# Patient Record
Sex: FEMALE | Race: WHITE | NOT HISPANIC OR LATINO | URBAN - METROPOLITAN AREA
[De-identification: names, ages, dates, MRNs, and addresses within clinical notes are randomized per-mention and may not be internally consistent; named-entity substitution may affect disease eponyms.]

---

## 2017-11-13 ENCOUNTER — INPATIENT (INPATIENT)
Facility: HOSPITAL | Age: 32
LOS: 0 days | Discharge: HOME | DRG: 103 | End: 2017-11-14
Attending: INTERNAL MEDICINE | Admitting: INTERNAL MEDICINE
Payer: COMMERCIAL

## 2017-11-13 VITALS
DIASTOLIC BLOOD PRESSURE: 67 MMHG | RESPIRATION RATE: 16 BRPM | WEIGHT: 134.92 LBS | HEART RATE: 94 BPM | TEMPERATURE: 98 F | OXYGEN SATURATION: 98 % | SYSTOLIC BLOOD PRESSURE: 99 MMHG

## 2017-11-13 DIAGNOSIS — A87.9 VIRAL MENINGITIS, UNSPECIFIED: ICD-10-CM

## 2017-11-13 DIAGNOSIS — R51 HEADACHE: ICD-10-CM

## 2017-11-13 DIAGNOSIS — R63.8 OTHER SYMPTOMS AND SIGNS CONCERNING FOOD AND FLUID INTAKE: ICD-10-CM

## 2017-11-13 DIAGNOSIS — Z29.9 ENCOUNTER FOR PROPHYLACTIC MEASURES, UNSPECIFIED: ICD-10-CM

## 2017-11-13 LAB
ALBUMIN SERPL ELPH-MCNC: 4.3 G/DL — SIGNIFICANT CHANGE UP (ref 3.3–5)
ALP SERPL-CCNC: 56 U/L — SIGNIFICANT CHANGE UP (ref 40–120)
ALT FLD-CCNC: 26 U/L — SIGNIFICANT CHANGE UP (ref 10–45)
ANION GAP SERPL CALC-SCNC: 13 MMOL/L — SIGNIFICANT CHANGE UP (ref 5–17)
ANISOCYTOSIS BLD QL: SLIGHT — SIGNIFICANT CHANGE UP
APTT BLD: 32.1 SEC — SIGNIFICANT CHANGE UP (ref 27.5–37.4)
AST SERPL-CCNC: 12 U/L — SIGNIFICANT CHANGE UP (ref 10–40)
BASOPHILS NFR BLD AUTO: 1 % — SIGNIFICANT CHANGE UP (ref 0–2)
BILIRUB SERPL-MCNC: 0.2 MG/DL — SIGNIFICANT CHANGE UP (ref 0.2–1.2)
BUN SERPL-MCNC: 12 MG/DL — SIGNIFICANT CHANGE UP (ref 7–23)
CALCIUM SERPL-MCNC: 9.2 MG/DL — SIGNIFICANT CHANGE UP (ref 8.4–10.5)
CHLORIDE SERPL-SCNC: 104 MMOL/L — SIGNIFICANT CHANGE UP (ref 96–108)
CO2 SERPL-SCNC: 26 MMOL/L — SIGNIFICANT CHANGE UP (ref 22–31)
CREAT SERPL-MCNC: 0.61 MG/DL — SIGNIFICANT CHANGE UP (ref 0.5–1.3)
CRP SERPL-MCNC: 1.2 MG/DL — HIGH (ref 0–0.4)
EOSINOPHIL NFR BLD AUTO: 3 % — SIGNIFICANT CHANGE UP (ref 0–6)
ERYTHROCYTE [SEDIMENTATION RATE] IN BLOOD: 16 MM/HR — HIGH
GIANT PLATELETS BLD QL SMEAR: PRESENT — SIGNIFICANT CHANGE UP
GLUCOSE SERPL-MCNC: 87 MG/DL — SIGNIFICANT CHANGE UP (ref 70–99)
HCG SERPL-ACNC: <.1 MIU/ML — SIGNIFICANT CHANGE UP
HCT VFR BLD CALC: 38 % — SIGNIFICANT CHANGE UP (ref 34.5–45)
HGB BLD-MCNC: 13 G/DL — SIGNIFICANT CHANGE UP (ref 11.5–15.5)
INR BLD: 1.06 — SIGNIFICANT CHANGE UP (ref 0.88–1.16)
LG PLATELETS BLD QL AUTO: PRESENT — SIGNIFICANT CHANGE UP
LYMPHOCYTES # BLD AUTO: 45 % — HIGH (ref 13–44)
MACROCYTES BLD QL: SLIGHT — SIGNIFICANT CHANGE UP
MANUAL DIF COMMENT BLD-IMP: SIGNIFICANT CHANGE UP
MANUAL SMEAR VERIFICATION: SIGNIFICANT CHANGE UP
MCHC RBC-ENTMCNC: 29.7 PG — SIGNIFICANT CHANGE UP (ref 27–34)
MCHC RBC-ENTMCNC: 34.2 G/DL — SIGNIFICANT CHANGE UP (ref 32–36)
MCV RBC AUTO: 87 FL — SIGNIFICANT CHANGE UP (ref 80–100)
MONOCYTES NFR BLD AUTO: 8 % — SIGNIFICANT CHANGE UP (ref 2–14)
NEUTROPHILS NFR BLD AUTO: 43 % — SIGNIFICANT CHANGE UP (ref 43–77)
OVALOCYTES BLD QL SMEAR: SLIGHT — SIGNIFICANT CHANGE UP
PLAT MORPH BLD: (no result)
PLATELET # BLD AUTO: 206 K/UL — SIGNIFICANT CHANGE UP (ref 150–400)
POIKILOCYTOSIS BLD QL AUTO: SLIGHT — SIGNIFICANT CHANGE UP
POLYCHROMASIA BLD QL SMEAR: SLIGHT — SIGNIFICANT CHANGE UP
POTASSIUM SERPL-MCNC: 3.7 MMOL/L — SIGNIFICANT CHANGE UP (ref 3.5–5.3)
POTASSIUM SERPL-SCNC: 3.7 MMOL/L — SIGNIFICANT CHANGE UP (ref 3.5–5.3)
PROT SERPL-MCNC: 7.3 G/DL — SIGNIFICANT CHANGE UP (ref 6–8.3)
PROTHROM AB SERPL-ACNC: 11.8 SEC — SIGNIFICANT CHANGE UP (ref 9.8–12.7)
RAPID RVP RESULT: SIGNIFICANT CHANGE UP
RBC # BLD: 4.37 M/UL — SIGNIFICANT CHANGE UP (ref 3.8–5.2)
RBC # FLD: 12.6 % — SIGNIFICANT CHANGE UP (ref 10.3–16.9)
RBC BLD AUTO: (no result)
SODIUM SERPL-SCNC: 143 MMOL/L — SIGNIFICANT CHANGE UP (ref 135–145)
WBC # BLD: 6.3 K/UL — SIGNIFICANT CHANGE UP (ref 3.8–10.5)
WBC # FLD AUTO: 6.3 K/UL — SIGNIFICANT CHANGE UP (ref 3.8–10.5)

## 2017-11-13 PROCEDURE — 70551 MRI BRAIN STEM W/O DYE: CPT | Mod: 26

## 2017-11-13 PROCEDURE — 99284 EMERGENCY DEPT VISIT MOD MDM: CPT

## 2017-11-13 RX ORDER — SODIUM CHLORIDE 9 MG/ML
1000 INJECTION INTRAMUSCULAR; INTRAVENOUS; SUBCUTANEOUS
Qty: 0 | Refills: 0 | Status: DISCONTINUED | OUTPATIENT
Start: 2017-11-13 | End: 2017-11-14

## 2017-11-13 RX ORDER — INFLUENZA VIRUS VACCINE 15; 15; 15; 15 UG/.5ML; UG/.5ML; UG/.5ML; UG/.5ML
0.5 SUSPENSION INTRAMUSCULAR ONCE
Qty: 0 | Refills: 0 | Status: DISCONTINUED | OUTPATIENT
Start: 2017-11-13 | End: 2017-11-14

## 2017-11-13 RX ADMIN — SODIUM CHLORIDE 125 MILLILITER(S): 9 INJECTION INTRAMUSCULAR; INTRAVENOUS; SUBCUTANEOUS at 22:08

## 2017-11-13 RX ADMIN — Medication 1 TABLET(S): at 23:00

## 2017-11-13 RX ADMIN — Medication 1 TABLET(S): at 14:30

## 2017-11-13 RX ADMIN — Medication 1 TABLET(S): at 13:36

## 2017-11-13 RX ADMIN — Medication 1 TABLET(S): at 22:08

## 2017-11-13 RX ADMIN — SODIUM CHLORIDE 125 MILLILITER(S): 9 INJECTION INTRAMUSCULAR; INTRAVENOUS; SUBCUTANEOUS at 12:57

## 2017-11-13 NOTE — ED PROVIDER NOTE - OBJECTIVE STATEMENT
31 yo female h/o lumbar disc herniation s/p epidural injection 8/17 c/o resolved shooting pains bilat thighs, gen weakness, fever but continued myalgias and ha.  Pt w onset of sx last week, had eval at [facility] in [place] w neg mri spine w iv contrast, ct head, lp, labs and was discharged w supportive care and fiorocet.  Pt went to Dr Carty for continued ha and gen weakness, myalgias sx and sent to ed for eval.  Pt reports no fever x 2 d.  No photophobia.  Ha worse w standing up but was like this prior to lp done 4 d ago.  No uri sx, cough, sob, rash, cp, palpitations.  Pt had epigastric pain that started after taking medrol dose pack x 2 d last wk for what she thought was her disc related pain but stopped after 2 d and sx began to improve.  No travel, sick contacts.

## 2017-11-13 NOTE — H&P ADULT - PROBLEM SELECTOR PLAN 1
- Likely 2/2 prior lumbar puncture. Worse when standing, resolves completely when reclined.   - Dr. Carty following, appreciate recs.  - ESR/CRP elevated in the setting of resolving viral meningitis.  - f/u MR Brain.  - Consider blood patch for treatment of post-LP headache.  - Patient not complaining of any pain currently. Consider fioricet or tylenol if pain worsens. - Likely 2/2 prior lumbar puncture. Worse when standing, resolves completely when reclined.   - Dr. Carty following, appreciate recs.  - ESR/CRP elevated in the setting of resolving viral meningitis.  - f/u MR Brain.  - Consider blood patch for treatment of post-LP headache.  - Consider gallium scan  - f/u Procalcitonin  - Patient not complaining of any pain currently. Consider fioricet or tylenol if pain worsens.  - f/u BCx, RVP - Likely 2/2 prior lumbar puncture vs. resolving viral meningitis. Worse when standing, resolves completely when reclined.   - Dr. Carty following for neurology, rec noncontrast MRI brain to evaluate for meningeal enhancement; Recommending supportive tx w/ caffeine and possible blood patch in AM; appreciate recs.  - ESR/CRP elevated in the setting of resolving viral meningitis. Dr. Miller following from ID, will continue to monitor given resolving symptoms and no fever/WBC elevation.  - f/u MR Brain.  - Consider blood patch for treatment of post-LP headache.  - Consider gallium scan if fevers persist  - f/u Procalcitonin per ID recs  - Patient not complaining of any pain currently. Consider fioricet or tylenol if pain worsens.  - f/u BCx, RVP  - f/u official records from [facility]. Per records reported by Dr. Carty, WBC was WNL at ER visit, with LP w/ 2WBCs, 1RBC, protein 19, and normal glucose. influenza negative, lactic acid negative, beta-HCG negative.   - MRI lumbar spine from OSH report brought in by patient showing no evidence of abscess or spinal compression. 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

## 2017-11-13 NOTE — H&P ADULT - HISTORY OF PRESENT ILLNESS
32F pmhx lower back pain 2/2 work injury who presents with 72 hours of headache upon standing after being worked up at a hospital in [place], [place] for meningitis. Per the patient 1 year ago she hurt her back lifting a child in her job as a . She had such severe pain that she went for an epidural in August with good relief of her pain. On Monday she began to have severe pain that started in her groin area and shot down her legs. She thought it was a recurrence of her chronic lower back pain but the symptoms did not resolve and she couldn't sit or lay down without considerable worsening of her pain. Her leg pain at that time was associated with nuchal rigidity, photophobia and nausea and she was evaluated at the outside hospital with a CXR, CT, Lumbar MRI and Lumbar puncture. Her mother at the bedside will be bringing those records in the morning tomorrow. Per the patient and mother the workup was negative and she was diagnosed clinically with viral meningitis. She left the hospital with a prescription of fioricet and instructions 32F pmhx lower back pain 2/2 work injury who presents with 72 hours of headache upon standing after being worked up at a hospital in [place], [place] for meningitis. Per the patient 1 year ago she hurt her back lifting a child in her job as a . She had such severe pain that she went for an epidural in August with good relief of her pain. On Monday she began to have severe pain that started in her groin area and shot down her legs. She thought it was a recurrence of her chronic lower back pain but the symptoms did not resolve and she couldn't sit or lay down without considerable worsening of her pain. Her leg pain at that time was associated with Tmax 103.5, nuchal rigidity, photophobia and nausea and she was evaluated at the outside hospital with a CXR, CT, Lumbar MRI and Lumbar puncture. Her mother at the bedside will be bringing those records in the morning tomorrow. Per the patient and mother the workup was negative and she was diagnosed clinically with viral meningitis. She left the hospital with instructions for supportive care (fioricet, hydration). Her symptoms were slowly improving until Monday when she experienced excruciating pain in her head upon standing associated with nausea and inability to focus her eyes due to pain. The pain completely resolved upon laying down. She was seen by Dr. Carty in the office who advised her to come to the [facility] emergency department.    In the ED VS T 98.9, HR 91, BP 96/62, RR 18 SpO2 98%. She was given IVF, Fioricet and the ED staff spoke to Dr. Carty who requested a MR Brain, CRP and ESR and she was endorsed to the medicine service under Dr. Lopez.    Currently she denies any HA/F/C/N/V/CP/SOB. She endorses severe headache upon standing that is completely relieved when reclining.    ROS otherwise negative.

## 2017-11-13 NOTE — CHART NOTE - NSCHARTNOTEFT_GEN_A_CORE
Spoke with medicine physician at [facility] to confirm OSH test results. At time of ER visit on 11/09, Tmax was 39 degrees celcius. WBC 6.1 with normal differential, CSF studies showed clear fluid, with 2WBCs, 1RBC, Glucose 56, Protein 19, Gram stain negative, CSF and blood cultures with no growth to date, and MRI with no lumbar fluid collection, stenosis or abscess. CSF testing for west nile virus was negative. CSF and blood testing for lyme was still pending. The patient was offered admission for observation and declined, and was discharged from the ED with Fioricet and planned follow-up with PMD in 1-2 days. The patient's mother was able to supply MRI read report (in chart), and will obtain CDs of imaging tomorrow. Discussed results with Dr. Carty, who recommended getting MRI without contrast instead of with contrast, and continuing oral caffeine supplementation, with plan for possible blood patch by anesthesia in AM if symptoms unimproved. Spoke with medicine physician at [facility] to confirm test results. At time of ER visit on 11/09, Tmax was 39 degrees celcius. WBC 6.1 with normal differential, CSF studies showed clear fluid, with 2WBCs, 1RBC, Glucose 56, Protein 19, Gram stain negative, CSF and blood cultures with no growth to date, and MRI with no lumbar fluid collection, stenosis or abscess. CSF testing for west nile virus was negative. CSF and blood testing for lyme was still pending. No antibiotics were given in the ED. The patient was offered admission for observation and declined, and was discharged from the ED with Fioricet and planned follow-up with PMD in 1-2 days. The patient's mother was able to supply MRI read report (in chart), and will obtain CDs of imaging tomorrow. Discussed results with Dr. Carty, who recommended getting MRI without contrast instead of with contrast, and continuing oral caffeine supplementation, with plan for possible blood patch by anesthesia in AM if symptoms unimproved.

## 2017-11-13 NOTE — H&P ADULT - NSHPPHYSICALEXAM_GEN_ALL_CORE
.  VITAL SIGNS:  T(C): 37.5 (11-13-17 @ 15:02), Max: 37.5 (11-13-17 @ 15:02)  T(F): 99.5 (11-13-17 @ 15:02), Max: 99.5 (11-13-17 @ 15:02)  HR: 86 (11-13-17 @ 15:02) (86 - 94)  BP: 100/63 (11-13-17 @ 15:02) (96/62 - 100/63)  BP(mean): --  RR: 18 (11-13-17 @ 15:02) (16 - 18)  SpO2: 98% (11-13-17 @ 13:24) (98% - 98%)  Wt(kg): --    PHYSICAL EXAM:    Constitutional: WDWN resting comfortably in bed; NAD  Head: NC/AT  Eyes: PERRL, EOMI, anicteric sclera  ENT: no nasal discharge; uvula midline, no oropharyngeal erythema or exudates; MMM  Neck: supple; no JVD or thyromegaly  Respiratory: CTA B/L; no W/R/R, no retractions  Cardiac: +S1/S2; RRR; no M/R/G; PMI non-displaced  Gastrointestinal: soft, NT/ND; no rebound or guarding; +BSx4  Back: spine midline, no bony tenderness or step-offs; no CVAT B/L. Small, punctate scab on lower back, well healed.  Extremities: WWP, no clubbing or cyanosis; no peripheral edema  Musculoskeletal: NROM x4; no joint swelling, tenderness or erythema  Vascular: 2+ radial, femoral, DP/PT pulses B/L  Dermatologic: skin warm, dry and intact; no rashes, wounds, or scars  Lymphatic: no submandibular or cervical LAD  Neurologic: AAOx3; CNII-XII grossly intact; no focal deficits  Psychiatric: affect and characteristics of appearance, verbalizations, behaviors are appropriate .  VITAL SIGNS:  T(C): 37.5 (11-13-17 @ 15:02), Max: 37.5 (11-13-17 @ 15:02)  T(F): 99.5 (11-13-17 @ 15:02), Max: 99.5 (11-13-17 @ 15:02)  HR: 86 (11-13-17 @ 15:02) (86 - 94)  BP: 100/63 (11-13-17 @ 15:02) (96/62 - 100/63)  BP(mean): --  RR: 18 (11-13-17 @ 15:02) (16 - 18)  SpO2: 98% (11-13-17 @ 13:24) (98% - 98%)  Wt(kg): --    PHYSICAL EXAM:    Constitutional: WDWN resting comfortably in bed; NAD  Head: NC/AT  Eyes: PERRL, EOMI, anicteric sclera  ENT: no nasal discharge; uvula midline, no oropharyngeal erythema or exudates; MMM  Neck: supple; no JVD or thyromegaly. Minimal nuchal rigidity. Negative straight leg raise.  Respiratory: CTA B/L; no W/R/R, no retractions  Cardiac: +S1/S2; RRR; no M/R/G; PMI non-displaced  Gastrointestinal: soft, NT/ND; no rebound or guarding; +BSx4  Back: spine midline, no bony tenderness or step-offs; no CVAT B/L. Small, punctate scab on lower back, well healed.  Extremities: WWP, no clubbing or cyanosis; no peripheral edema  Musculoskeletal: NROM x4; no joint swelling, tenderness or erythema  Vascular: 2+ radial, femoral, DP/PT pulses B/L  Dermatologic: skin warm, dry and intact; no rashes, wounds, or scars  Lymphatic: no submandibular or cervical LAD  Neurologic: AAOx3; CNII-XII grossly intact; no focal deficits  Psychiatric: affect and characteristics of appearance, verbalizations, behaviors are appropriate

## 2017-11-13 NOTE — H&P ADULT - ASSESSMENT
32F no pmhx who presents with signs and symptoms consistent with post-LP headache s/p workup at OSH in [facility] for viral meningitis.

## 2017-11-13 NOTE — ED PROVIDER NOTE - PROGRESS NOTE DETAILS
Pt discussed w Dr Merino - requests pt have esr, crp, mri brain w contrast and he will follow as well as Dr So, pt tba to Dr Lopez.  Pt discussed w Dr Lopez - agrees w admit plan.  Pt afebrile and has been since yest - no need for isolation at this time. Labs, mri ordered.

## 2017-11-13 NOTE — H&P ADULT - NSHPLABSRESULTS_GEN_ALL_CORE
.  LABS:                         13.0   6.3   )-----------( 206      ( 13 Nov 2017 12:24 )             38.0     11-13    143  |  104  |  12  ----------------------------<  87  3.7   |  26  |  0.61    Ca    9.2      13 Nov 2017 12:24    TPro  7.3  /  Alb  4.3  /  TBili  0.2  /  DBili  x   /  AST  12  /  ALT  26  /  AlkPhos  56  11-13    PT/INR - ( 13 Nov 2017 12:24 )   PT: 11.8 sec;   INR: 1.06          PTT - ( 13 Nov 2017 12:24 )  PTT:32.1 sec              RADIOLOGY, EKG & ADDITIONAL TESTS: Reviewed.

## 2017-11-13 NOTE — ED ADULT NURSE NOTE - OBJECTIVE STATEMENT
Patient is a 32 year old female presenting to the ED complaining of headache and back pain that began 5 days ago. Patient states "I have chronic back pain so I thought it was just that but it began to snowball into other symptoms". Patient reports she had fevers for 5 days, reaching as high as 103.5. Patient also reports nuchal rigidity, body aches, photosensitivity. Patient states "if I were to stand up I would collapse from the pain if I weren't holding on to something". Patient denies any chest pain, nausea, vomiting or diarrhea, dizziness or falls. Patient reports she had a LP done and it was negative for meningitis, but the neurologist sent her to the ED because they believe it was a false negative. Patient is a 32 year old female presenting to the ED complaining of headache and back pain that began 5 days ago. Patient states "I have chronic back pain so I thought it was just that but it began to snowball into other symptoms". Patient reports she had fevers for 5 days, reaching as high as 103.5. Patient also reports nuchal rigidity, body aches, photosensitivity. Patient states "if I were to stand up I would collapse from the pain if I weren't holding on to something". Patient denies any sick contacts chest pain, nausea, vomiting or diarrhea, dizziness or falls. Patient reports she had a LP done and it was negative for meningitis, but the neurologist sent her to the ED because they believe it was a false negative.

## 2017-11-13 NOTE — H&P ADULT - PROBLEM SELECTOR PLAN 2
- Resolving. Plan as above.  - Pts mother to bring in outside records and imaging tomorrow morning (11/14). - Resolving. Plan as above. No indication for IV antibiotics at this time as pt remains  non-toxic, afebrile, with normal WBC, and has been improving off of antibiotics. Will continue to monitor closely.   - Pts mother to bring in outside records and imaging tomorrow morning (11/14).

## 2017-11-13 NOTE — ED ADULT NURSE NOTE - CHPI ED SYMPTOMS NEG
no vomiting/no nausea/no confusion/no dizziness/no numbness/no change in level of consciousness/no blurred vision

## 2017-11-13 NOTE — ED PROVIDER NOTE - MEDICAL DECISION MAKING DETAILS
Pt c/o resolved myalgias, fever but cont ha and generalized weakness since last week w neg lp, mri lumbar spine, labs, ct head.  Seen and eval by Dr Merino in his office, sent in for further eval tba to Dr Lopez.  Pt afebrile, no neuro deficits.  ? resolving viral meningitis vs post lp ha vs other etiology.  Plan labs, mri, admit.

## 2017-11-14 VITALS
SYSTOLIC BLOOD PRESSURE: 100 MMHG | HEART RATE: 85 BPM | OXYGEN SATURATION: 98 % | RESPIRATION RATE: 17 BRPM | DIASTOLIC BLOOD PRESSURE: 64 MMHG | TEMPERATURE: 98 F

## 2017-11-14 LAB
ANION GAP SERPL CALC-SCNC: 9 MMOL/L — SIGNIFICANT CHANGE UP (ref 5–17)
BUN SERPL-MCNC: 13 MG/DL — SIGNIFICANT CHANGE UP (ref 7–23)
CALCIUM SERPL-MCNC: 8.6 MG/DL — SIGNIFICANT CHANGE UP (ref 8.4–10.5)
CHLORIDE SERPL-SCNC: 106 MMOL/L — SIGNIFICANT CHANGE UP (ref 96–108)
CO2 SERPL-SCNC: 25 MMOL/L — SIGNIFICANT CHANGE UP (ref 22–31)
CREAT SERPL-MCNC: 0.67 MG/DL — SIGNIFICANT CHANGE UP (ref 0.5–1.3)
GLUCOSE SERPL-MCNC: 88 MG/DL — SIGNIFICANT CHANGE UP (ref 70–99)
HCT VFR BLD CALC: 34.9 % — SIGNIFICANT CHANGE UP (ref 34.5–45)
HGB BLD-MCNC: 11.3 G/DL — LOW (ref 11.5–15.5)
HIV 1+2 AB+HIV1 P24 AG SERPL QL IA: SIGNIFICANT CHANGE UP
MCHC RBC-ENTMCNC: 28.6 PG — SIGNIFICANT CHANGE UP (ref 27–34)
MCHC RBC-ENTMCNC: 32.4 G/DL — SIGNIFICANT CHANGE UP (ref 32–36)
MCV RBC AUTO: 88.4 FL — SIGNIFICANT CHANGE UP (ref 80–100)
PLATELET # BLD AUTO: 187 K/UL — SIGNIFICANT CHANGE UP (ref 150–400)
POTASSIUM SERPL-MCNC: 4.1 MMOL/L — SIGNIFICANT CHANGE UP (ref 3.5–5.3)
POTASSIUM SERPL-SCNC: 4.1 MMOL/L — SIGNIFICANT CHANGE UP (ref 3.5–5.3)
PROCALCITONIN SERPL-MCNC: <0.05 NG/ML — SIGNIFICANT CHANGE UP (ref 0–0.04)
RBC # BLD: 3.95 M/UL — SIGNIFICANT CHANGE UP (ref 3.8–5.2)
RBC # FLD: 12.7 % — SIGNIFICANT CHANGE UP (ref 10.3–16.9)
SODIUM SERPL-SCNC: 140 MMOL/L — SIGNIFICANT CHANGE UP (ref 135–145)
WBC # BLD: 6.4 K/UL — SIGNIFICANT CHANGE UP (ref 3.8–10.5)
WBC # FLD AUTO: 6.4 K/UL — SIGNIFICANT CHANGE UP (ref 3.8–10.5)

## 2017-11-14 PROCEDURE — 84702 CHORIONIC GONADOTROPIN TEST: CPT

## 2017-11-14 PROCEDURE — 85652 RBC SED RATE AUTOMATED: CPT

## 2017-11-14 PROCEDURE — 36415 COLL VENOUS BLD VENIPUNCTURE: CPT

## 2017-11-14 PROCEDURE — 85730 THROMBOPLASTIN TIME PARTIAL: CPT

## 2017-11-14 PROCEDURE — 85025 COMPLETE CBC W/AUTO DIFF WBC: CPT

## 2017-11-14 PROCEDURE — 87581 M.PNEUMON DNA AMP PROBE: CPT

## 2017-11-14 PROCEDURE — 87486 CHLMYD PNEUM DNA AMP PROBE: CPT

## 2017-11-14 PROCEDURE — 85610 PROTHROMBIN TIME: CPT

## 2017-11-14 PROCEDURE — 80048 BASIC METABOLIC PNL TOTAL CA: CPT

## 2017-11-14 PROCEDURE — 87040 BLOOD CULTURE FOR BACTERIA: CPT

## 2017-11-14 PROCEDURE — 70551 MRI BRAIN STEM W/O DYE: CPT

## 2017-11-14 PROCEDURE — 86140 C-REACTIVE PROTEIN: CPT

## 2017-11-14 PROCEDURE — 80053 COMPREHEN METABOLIC PANEL: CPT

## 2017-11-14 PROCEDURE — 87389 HIV-1 AG W/HIV-1&-2 AB AG IA: CPT

## 2017-11-14 PROCEDURE — 87798 DETECT AGENT NOS DNA AMP: CPT

## 2017-11-14 PROCEDURE — G0378: CPT

## 2017-11-14 PROCEDURE — 85027 COMPLETE CBC AUTOMATED: CPT

## 2017-11-14 PROCEDURE — 84145 PROCALCITONIN (PCT): CPT

## 2017-11-14 PROCEDURE — 99285 EMERGENCY DEPT VISIT HI MDM: CPT | Mod: 25

## 2017-11-14 PROCEDURE — 87633 RESP VIRUS 12-25 TARGETS: CPT

## 2017-11-14 RX ADMIN — Medication 1 TABLET(S): at 05:32

## 2017-11-14 RX ADMIN — Medication 1 TABLET(S): at 06:30

## 2017-11-14 NOTE — DISCHARGE NOTE ADULT - HOSPITAL COURSE
32 year old woman with past med hx of back pain 2/2 straining at work who presented with signs and symptoms consistent with post LP headache.  Patient recently at [facility] for viral meningitis.   Patient vitals stable on admission with PE significant for mild nuchal rigidity.  Patient with normal white count. Patient had MRI which was normal.  HIV non-reactive. Patient seen by neurology attributed symptoms to post-LP headache. Patient viewed safe for discharge.

## 2017-11-14 NOTE — DISCHARGE NOTE ADULT - PLAN OF CARE
Resolution Patient with post lumbar puncture headache.  Please continue with Fioricet as needed every 4 hours. Please follow up with Dr. Carty.

## 2017-11-14 NOTE — DISCHARGE NOTE ADULT - CARE PROVIDER_API CALL
Dharmesh Carty), Electrodiagnostic Medicine; Neurology  [address] [address]  Phone: [phone]  Fax: [phone]

## 2017-11-14 NOTE — CONSULT NOTE ADULT - SUBJECTIVE AND OBJECTIVE BOX
Patient is a 32y old  Female who presents with a chief complaint of Headache upon standing s/p lumbar puncture (13 Nov 2017 14:55)        HPI:  32F pmhx lower back pain 2/2 work injury who presents with 72 hours of headache upon standing after being worked up at a hospital in [place], [place] for meningitis. Per the patient 1 year ago she hurt her back lifting a child in her job as a . She had such severe pain that she went for an epidural in August with good relief of her pain. On Monday she began to have severe pain that started in her groin area and shot down her legs. She thought it was a recurrence of her chronic lower back pain but the symptoms did not resolve and she couldn't sit or lay down without considerable worsening of her pain. Her leg pain at that time was associated with Tmax 103.5, nuchal rigidity, photophobia and nausea and she was evaluated at the outside hospital with a CXR, CT, Lumbar MRI and Lumbar puncture. Her mother at the bedside will be bringing those records in the morning tomorrow. Per the patient and mother the workup was negative and she was diagnosed clinically with viral meningitis. She left the hospital with instructions for supportive care (fioricet, hydration). Her symptoms were slowly improving until Monday when she experienced excruciating pain in her head upon standing associated with nausea and inability to focus her eyes due to pain. The pain completely resolved upon laying down. She was seen by Dr. Carty in the office who advised her to come to the [facility] emergency department.    In the ED VS T 98.9, HR 91, BP 96/62, RR 18 SpO2 98%. She was given IVF, Fioricet and the ED staff spoke to Dr. Carty who requested a MR Brain, CRP and ESR and she was endorsed to the medicine service under Dr. Lopez.    Currently she denies any HA/F/C/N/V/CP/SOB. She endorses severe headache upon standing that is completely relieved when reclining.    ROS otherwise negative. (13 Nov 2017 14:55)    HA improved this AM- less orthostatic      Allergies  aspirin (Hives; Swelling)  lidocaine (Short breath)      Health Issues  HEADACHE  No h/o HF  Handoff  MEWS Score  Lumbar disc herniation  No pertinent past medical history  Headache  Prophylactic measure  Nutrition, metabolism, and development symptoms  Viral meningitis  Headache  No significant past surgical history  HEADACHE        FAMILY HISTORY:      MEDICATIONS  (STANDING):  influenza   Vaccine 0.5 milliLiter(s) IntraMuscular once  sodium chloride 0.9%. 1000 milliLiter(s) (125 mL/Hr) IV Continuous <Continuous>    MEDICATIONS  (PRN):  acetaminophen 325 mG/butalbital 50 mG/caffeine 40 mG 1 Tablet(s) Oral every 4 hours PRN headache      PAST MEDICAL & SURGICAL HISTORY:  Lumbar disc herniation  No significant past surgical history      Labs                          13.0   6.3   )-----------( 206      ( 13 Nov 2017 12:24 )             38.0     11-13    143  |  104  |  12  ----------------------------<  87  3.7   |  26  |  0.61    Ca    9.2      13 Nov 2017 12:24    TPro  7.3  /  Alb  4.3  /  TBili  0.2  /  DBili  x   /  AST  12  /  ALT  26  /  AlkPhos  56  11-13      Radiology:    Physical Exam    MENTAL STATUS  -Level of Consciousness- awake    Orientation- person, place time  Language- aphasia/ dysarthria  Memory- recent and remote      Cranial Nerve 1- 12  Pupils- equal and reactive  Eye movements-full  Facial - no asymmetry   Lower CN-nl    Gait and Station-no foot drop    MOTOR  Upper-nl  Lower-nl    Reflexes-nl    Sensation- no sensory level    Cerebellar-no tremors    vascular -intact    Assessment- HA- post viral vs Post LP    Plan Await MRI- if HA better, will avoid blood patch

## 2017-11-14 NOTE — DISCHARGE NOTE ADULT - CARE PLAN
Principal Discharge DX:	Post lumbar puncture headache  Goal:	Resolution  Instructions for follow-up, activity and diet:	Patient with post lumbar puncture headache.  Please continue with Fioricet as needed every 4 hours.  Secondary Diagnosis:	Viral meningitis  Instructions for follow-up, activity and diet:	Please follow up with Dr. Carty.

## 2017-11-14 NOTE — CONSULT NOTE ADULT - SUBJECTIVE AND OBJECTIVE BOX
Patient seen early AM and also case discussed and guidance provided via telephone yesterday    HPI:  32F pmhx lower back pain 2/2 work injury who presents with 72 hours of headache upon standing after being worked up at a hospital in [place], [place] for meningitis. Per the patient 1 year ago she hurt her back lifting a child in her job as a . She had such severe pain that she went for an epidural in August with good relief of her pain. On Monday she began to have severe pain that started in her groin area and shot down her legs. She thought it was a recurrence of her chronic lower back pain but the symptoms did not resolve and she couldn't sit or lay down without considerable worsening of her pain. Her leg pain at that time was associated with Tmax 103.5, nuchal rigidity, photophobia and nausea and she was evaluated at the outside hospital with a CXR, CT, Lumbar MRI and Lumbar puncture. Her mother at the bedside will be bringing those records in the morning tomorrow. Per the patient and mother the workup was negative and she was diagnosed clinically with viral meningitis. She left the hospital with instructions for supportive care (fioricet, hydration). Her symptoms were slowly improving until Monday when she experienced excruciating pain in her head upon standing associated with nausea and inability to focus her eyes due to pain. The pain completely resolved upon laying down. She was seen by Dr. Carty in the office who advised her to come to the [facility] emergency department.    In the ED VS T 98.9, HR 91, BP 96/62, RR 18 SpO2 98%. She was given IVF, Fioricet and the ED staff spoke to Dr. Carty who requested a MR Brain, CRP and ESR and she was endorsed to the medicine service under Dr. Lopez.    Currently she denies any HA/F/C/N/V/CP/SOB. She endorses severe headache upon standing that is completely relieved when reclining.    ROS otherwise negative. (13 Nov 2017 14:55)      PAST MEDICAL & SURGICAL HISTORY:  Lumbar disc herniation  No significant past surgical history      MEDICATIONS  (STANDING):  influenza   Vaccine 0.5 milliLiter(s) IntraMuscular once  sodium chloride 0.9%. 1000 milliLiter(s) (125 mL/Hr) IV Continuous <Continuous>    MEDICATIONS  (PRN):  acetaminophen 325 mG/butalbital 50 mG/caffeine 40 mG 1 Tablet(s) Oral every 4 hours PRN headache      Allergies    aspirin (Hives; Swelling)  lidocaine (Short breath)      SOCIAL HISTORY:  Lives at home  Works as a teacher  No IDU, ETOH abuse or smoking    FAMILY HISTORY:  Noncontributory    EXAM  Vital Signs Last 24 Hrs  T(C): 36.7 (14 Nov 2017 12:33), Max: 37.7 (13 Nov 2017 20:33)  T(F): 98.1 (14 Nov 2017 12:33), Max: 99.8 (13 Nov 2017 20:33)  HR: 85 (14 Nov 2017 12:33) (76 - 98)  BP: 100/64 (14 Nov 2017 12:33) (92/55 - 102/66)  BP(mean): --  RR: 17 (14 Nov 2017 12:33) (17 - 18)  SpO2: 98% (14 Nov 2017 12:33) (97% - 98%)  Awake and alert  On RA  EOMI  No conjunctival lesions  No adenopathy  Mild neck stiffness  RRR no murmurs  Chest CTA  ABd soft ND NT  No CVA tenderness  Mild spinal tenderness around the area of LP  No erythema ordrainage    LABS:                        11.3   6.4   )-----------( 187      ( 14 Nov 2017 08:07 )             34.9     11-14      140  |  106  |  13  ----------------------------<  88  4.1   |  25  |  0.67    Ca    8.6      14 Nov 2017 08:07    TPro  7.3  /  Alb  4.3  /  TBili  0.2  /  DBili  x   /  AST  12  /  ALT  26  /  AlkPhos  56  11-13    PT/INR - ( 13 Nov 2017 12:24 )   PT: 11.8 sec;   INR: 1.06          PTT - ( 13 Nov 2017 12:24 )  PTT:32.1 sec    Procalcitonin pending    Sedimentation Rate, Erythrocyte (11.13.17 @ 13:11)    Sedimentation Rate, Erythrocyte: 16 mm/Hr    HIV-1/2 Antigen/Antibody Screen by [facility]IA (11.14.17 @ 08:07)    HIV-1/2 Combo Result: Nonreact: The HIV Ag/Ab Combo test performed screens for HIV-1 p24 antigen,  antibodies to HIV-1 (group M and group O), and antibodies to HIV-2. All  specimens repeatedly reactive will reflex to an HIV 1/2 antibody  confirmation and differentiation test. This assay detects p24 antigen  which may be present prior to the development of HIV antibodies,  therefore a reactive result with a negative HIV 1/2 AB Confirmation  should be followed up with HIV-1 RNA, HIV-2 RNA and repeat testing in 4-8  weeks. A nonreactive result does not preclude previous exposure to or  infection with HIV-1 or HIV-2. [facility] prohibits disclosure of this  result to any unauthorized party.    C-Reactive Protein, Serum (11.13.17 @ 13:11)    C-Reactive Protein, Serum: 1.20 mg/dL    Culture - Blood (11.13.17 @ 18:51)    Specimen Source: .Blood Blood    Culture Results:   No growth at 12 hours    Culture - Blood (11.13.17 @ 18:51)    Specimen Source: .Blood Blood    Culture Results:   No growth at 12 hours    RADIOLOGY & ADDITIONAL STUDIES:    MR Head No Cont (11.13.17 @ 21:31) >    EXAM:  MR BRAIN                          PROCEDURE DATE:  11/13/2017         INTERPRETATION:  Clinical history: Headache. Post LP. Viral illness.     Technique: MRI of the brain was performed utilizing sagittal and axial   T1, axial T2,axial gradient echo, axial and coronal FLAIR and diffusion   imaging.    There are no prior studies available for comparison.    Findings: There is no evidence of abnormal signal changes within the   brain parenchyma. There is no evidence of mass-effect or midline shift.   There is no evidence of intra or extra-axial fluid collection. The   ventricles are of normal size and caliber. There is no evidence of acute   infarction. Evaluation of the intracranial vascular flow-voids appear   within normal limits.    The visualized paranasal sinuses and bilateral mastoid air cells are   clear.    Impression: Normal MRI of the brain.  No evidence of acute infarction.      Per discussion with Dr Merino: outpatient L-spine MRI without apparent infection

## 2017-11-14 NOTE — CONSULT NOTE ADULT - ASSESSMENT
Possible hx of viral meningitis  Now with post-LP headache  Already improved with symptomatic treatment  No apparent invasive bacterial infection    RECOMMEND  Supportive treatment and observation  No antimicrobial therapy needed

## 2017-11-14 NOTE — DISCHARGE NOTE ADULT - PATIENT PORTAL LINK FT
“You can access the [facility] Patient Portal, offered by [facility], by registering with the following website: [email or web address]”

## 2017-11-15 ENCOUNTER — INPATIENT (INPATIENT)
Facility: HOSPITAL | Age: 32
LOS: 13 days | Discharge: ANOTHER HOSPITAL | DRG: 98 | End: 2017-11-29
Attending: INTERNAL MEDICINE | Admitting: INTERNAL MEDICINE
Payer: COMMERCIAL

## 2017-11-15 VITALS
WEIGHT: 11.9 LBS | SYSTOLIC BLOOD PRESSURE: 116 MMHG | RESPIRATION RATE: 17 BRPM | OXYGEN SATURATION: 99 % | HEART RATE: 108 BPM | TEMPERATURE: 101 F | DIASTOLIC BLOOD PRESSURE: 79 MMHG | HEIGHT: 64 IN

## 2017-11-15 DIAGNOSIS — G03.9 MENINGITIS, UNSPECIFIED: ICD-10-CM

## 2017-11-15 DIAGNOSIS — Z29.9 ENCOUNTER FOR PROPHYLACTIC MEASURES, UNSPECIFIED: ICD-10-CM

## 2017-11-15 DIAGNOSIS — A41.9 SEPSIS, UNSPECIFIED ORGANISM: ICD-10-CM

## 2017-11-15 DIAGNOSIS — M51.26 OTHER INTERVERTEBRAL DISC DISPLACEMENT, LUMBAR REGION: ICD-10-CM

## 2017-11-15 LAB
ALBUMIN SERPL ELPH-MCNC: 4.3 G/DL — SIGNIFICANT CHANGE UP (ref 3.3–5)
ALP SERPL-CCNC: 65 U/L — SIGNIFICANT CHANGE UP (ref 40–120)
ALT FLD-CCNC: 33 U/L — SIGNIFICANT CHANGE UP (ref 10–45)
ANION GAP SERPL CALC-SCNC: 15 MMOL/L — SIGNIFICANT CHANGE UP (ref 5–17)
APPEARANCE CSF: CLEAR — SIGNIFICANT CHANGE UP
APPEARANCE UR: CLEAR — SIGNIFICANT CHANGE UP
APTT BLD: 41 SEC — HIGH (ref 27.5–37.4)
AST SERPL-CCNC: 21 U/L — SIGNIFICANT CHANGE UP (ref 10–40)
BILIRUB SERPL-MCNC: 0.2 MG/DL — SIGNIFICANT CHANGE UP (ref 0.2–1.2)
BILIRUB UR-MCNC: NEGATIVE — SIGNIFICANT CHANGE UP
BUN SERPL-MCNC: 12 MG/DL — SIGNIFICANT CHANGE UP (ref 7–23)
CALCIUM SERPL-MCNC: 9.4 MG/DL — SIGNIFICANT CHANGE UP (ref 8.4–10.5)
CHLORIDE SERPL-SCNC: 96 MMOL/L — SIGNIFICANT CHANGE UP (ref 96–108)
CO2 SERPL-SCNC: 27 MMOL/L — SIGNIFICANT CHANGE UP (ref 22–31)
COLOR CSF: SIGNIFICANT CHANGE UP
COLOR SPEC: YELLOW — SIGNIFICANT CHANGE UP
CREAT SERPL-MCNC: 0.69 MG/DL — SIGNIFICANT CHANGE UP (ref 0.5–1.3)
CSF COMMENTS: SIGNIFICANT CHANGE UP
CSF PCR RESULT: SIGNIFICANT CHANGE UP
DIFF PNL FLD: NEGATIVE — SIGNIFICANT CHANGE UP
GLUCOSE CSF-MCNC: 55 MG/DL — SIGNIFICANT CHANGE UP (ref 40–70)
GLUCOSE SERPL-MCNC: 88 MG/DL — SIGNIFICANT CHANGE UP (ref 70–99)
GLUCOSE UR QL: NEGATIVE — SIGNIFICANT CHANGE UP
GRAM STN FLD: SIGNIFICANT CHANGE UP
HCT VFR BLD CALC: 38.7 % — SIGNIFICANT CHANGE UP (ref 34.5–45)
HGB BLD-MCNC: 13.1 G/DL — SIGNIFICANT CHANGE UP (ref 11.5–15.5)
INR BLD: 1.07 — SIGNIFICANT CHANGE UP (ref 0.88–1.16)
KETONES UR-MCNC: NEGATIVE — SIGNIFICANT CHANGE UP
LACTATE SERPL-SCNC: 1 MMOL/L — SIGNIFICANT CHANGE UP (ref 0.5–2)
LEUKOCYTE ESTERASE UR-ACNC: NEGATIVE — SIGNIFICANT CHANGE UP
LYMPHOCYTES # BLD AUTO: 55 % — HIGH (ref 13–44)
LYMPHOCYTES # CSF: 16 % — LOW (ref 40–80)
MANUAL SMEAR VERIFICATION: SIGNIFICANT CHANGE UP
MCHC RBC-ENTMCNC: 29.5 PG — SIGNIFICANT CHANGE UP (ref 27–34)
MCHC RBC-ENTMCNC: 33.9 G/DL — SIGNIFICANT CHANGE UP (ref 32–36)
MCV RBC AUTO: 87.2 FL — SIGNIFICANT CHANGE UP (ref 80–100)
MONOCYTES NFR BLD AUTO: 4 % — SIGNIFICANT CHANGE UP (ref 2–14)
MONOS+MACROS NFR CSF: 6 % — LOW (ref 15–45)
NEUTROPHILS # CSF: 78 % — HIGH (ref 0–6)
NEUTROPHILS NFR BLD AUTO: 35 % — LOW (ref 43–77)
NEUTS BAND # BLD: 6 % — SIGNIFICANT CHANGE UP
NITRITE UR-MCNC: NEGATIVE — SIGNIFICANT CHANGE UP
NRBC NFR CSF: 1615 /UL — HIGH (ref 0–5)
PH UR: 6.5 — SIGNIFICANT CHANGE UP (ref 5–8)
PLAT MORPH BLD: NORMAL — SIGNIFICANT CHANGE UP
PLATELET # BLD AUTO: 244 K/UL — SIGNIFICANT CHANGE UP (ref 150–400)
POTASSIUM SERPL-MCNC: 3.9 MMOL/L — SIGNIFICANT CHANGE UP (ref 3.5–5.3)
POTASSIUM SERPL-SCNC: 3.9 MMOL/L — SIGNIFICANT CHANGE UP (ref 3.5–5.3)
PROT CSF-MCNC: 111 MG/DL — HIGH (ref 15–45)
PROT SERPL-MCNC: 7.8 G/DL — SIGNIFICANT CHANGE UP (ref 6–8.3)
PROT UR-MCNC: NEGATIVE MG/DL — SIGNIFICANT CHANGE UP
PROTHROM AB SERPL-ACNC: 11.9 SEC — SIGNIFICANT CHANGE UP (ref 9.8–12.7)
RBC # BLD: 4.44 M/UL — SIGNIFICANT CHANGE UP (ref 3.8–5.2)
RBC # CSF: 128 /UL — HIGH (ref 0–0)
RBC # FLD: 12.5 % — SIGNIFICANT CHANGE UP (ref 10.3–16.9)
RBC BLD AUTO: NORMAL — SIGNIFICANT CHANGE UP
SODIUM SERPL-SCNC: 138 MMOL/L — SIGNIFICANT CHANGE UP (ref 135–145)
SP GR SPEC: <=1.005 — SIGNIFICANT CHANGE UP (ref 1–1.03)
SPECIMEN SOURCE: SIGNIFICANT CHANGE UP
TUBE TYPE: SIGNIFICANT CHANGE UP
UROBILINOGEN FLD QL: 0.2 E.U./DL — SIGNIFICANT CHANGE UP
WBC # BLD: 11.4 K/UL — HIGH (ref 3.8–10.5)
WBC # FLD AUTO: 11.4 K/UL — HIGH (ref 3.8–10.5)

## 2017-11-15 PROCEDURE — 93010 ELECTROCARDIOGRAM REPORT: CPT | Mod: 59

## 2017-11-15 PROCEDURE — 99285 EMERGENCY DEPT VISIT HI MDM: CPT | Mod: 25

## 2017-11-15 PROCEDURE — 62270 DX LMBR SPI PNXR: CPT

## 2017-11-15 PROCEDURE — 71010: CPT | Mod: 26

## 2017-11-15 RX ORDER — VANCOMYCIN HCL 1 G
1000 VIAL (EA) INTRAVENOUS ONCE
Qty: 0 | Refills: 0 | Status: COMPLETED | OUTPATIENT
Start: 2017-11-15 | End: 2017-11-15

## 2017-11-15 RX ORDER — CEFTRIAXONE 500 MG/1
2 INJECTION, POWDER, FOR SOLUTION INTRAMUSCULAR; INTRAVENOUS ONCE
Qty: 0 | Refills: 0 | Status: COMPLETED | OUTPATIENT
Start: 2017-11-15 | End: 2017-11-15

## 2017-11-15 RX ORDER — ACYCLOVIR SODIUM 500 MG
600 VIAL (EA) INTRAVENOUS EVERY 8 HOURS
Qty: 0 | Refills: 0 | Status: DISCONTINUED | OUTPATIENT
Start: 2017-11-16 | End: 2017-11-17

## 2017-11-15 RX ORDER — VANCOMYCIN HCL 1 G
1000 VIAL (EA) INTRAVENOUS EVERY 12 HOURS
Qty: 0 | Refills: 0 | Status: DISCONTINUED | OUTPATIENT
Start: 2017-11-16 | End: 2017-11-16

## 2017-11-15 RX ORDER — INFLUENZA VIRUS VACCINE 15; 15; 15; 15 UG/.5ML; UG/.5ML; UG/.5ML; UG/.5ML
0.5 SUSPENSION INTRAMUSCULAR ONCE
Qty: 0 | Refills: 0 | Status: DISCONTINUED | OUTPATIENT
Start: 2017-11-15 | End: 2017-11-29

## 2017-11-15 RX ORDER — ACYCLOVIR SODIUM 500 MG
600 VIAL (EA) INTRAVENOUS ONCE
Qty: 0 | Refills: 0 | Status: COMPLETED | OUTPATIENT
Start: 2017-11-15 | End: 2017-11-15

## 2017-11-15 RX ORDER — DEXAMETHASONE 0.5 MG/5ML
10 ELIXIR ORAL ONCE
Qty: 0 | Refills: 0 | Status: COMPLETED | OUTPATIENT
Start: 2017-11-15 | End: 2017-11-15

## 2017-11-15 RX ORDER — SODIUM CHLORIDE 9 MG/ML
1000 INJECTION INTRAMUSCULAR; INTRAVENOUS; SUBCUTANEOUS
Qty: 0 | Refills: 0 | Status: DISCONTINUED | OUTPATIENT
Start: 2017-11-15 | End: 2017-11-17

## 2017-11-15 RX ORDER — SODIUM CHLORIDE 9 MG/ML
500 INJECTION INTRAMUSCULAR; INTRAVENOUS; SUBCUTANEOUS
Qty: 0 | Refills: 0 | Status: COMPLETED | OUTPATIENT
Start: 2017-11-15 | End: 2017-11-15

## 2017-11-15 RX ORDER — CEFTRIAXONE 500 MG/1
2 INJECTION, POWDER, FOR SOLUTION INTRAMUSCULAR; INTRAVENOUS EVERY 12 HOURS
Qty: 0 | Refills: 0 | Status: DISCONTINUED | OUTPATIENT
Start: 2017-11-16 | End: 2017-11-20

## 2017-11-15 RX ORDER — SODIUM CHLORIDE 9 MG/ML
3 INJECTION INTRAMUSCULAR; INTRAVENOUS; SUBCUTANEOUS ONCE
Qty: 0 | Refills: 0 | Status: COMPLETED | OUTPATIENT
Start: 2017-11-15 | End: 2017-11-15

## 2017-11-15 RX ORDER — ACETAMINOPHEN 500 MG
1000 TABLET ORAL ONCE
Qty: 0 | Refills: 0 | Status: COMPLETED | OUTPATIENT
Start: 2017-11-15 | End: 2017-11-15

## 2017-11-15 RX ORDER — ACETAMINOPHEN 500 MG
650 TABLET ORAL EVERY 6 HOURS
Qty: 0 | Refills: 0 | Status: DISCONTINUED | OUTPATIENT
Start: 2017-11-15 | End: 2017-11-29

## 2017-11-15 RX ORDER — ONDANSETRON 8 MG/1
4 TABLET, FILM COATED ORAL EVERY 6 HOURS
Qty: 0 | Refills: 0 | Status: DISCONTINUED | OUTPATIENT
Start: 2017-11-15 | End: 2017-11-23

## 2017-11-15 RX ORDER — HYDROMORPHONE HYDROCHLORIDE 2 MG/ML
1 INJECTION INTRAMUSCULAR; INTRAVENOUS; SUBCUTANEOUS ONCE
Qty: 0 | Refills: 0 | Status: DISCONTINUED | OUTPATIENT
Start: 2017-11-15 | End: 2017-11-15

## 2017-11-15 RX ORDER — MORPHINE SULFATE 50 MG/1
4 CAPSULE, EXTENDED RELEASE ORAL ONCE
Qty: 0 | Refills: 0 | Status: DISCONTINUED | OUTPATIENT
Start: 2017-11-15 | End: 2017-11-15

## 2017-11-15 RX ORDER — ACYCLOVIR SODIUM 500 MG
VIAL (EA) INTRAVENOUS
Qty: 0 | Refills: 0 | Status: DISCONTINUED | OUTPATIENT
Start: 2017-11-15 | End: 2017-11-17

## 2017-11-15 RX ORDER — ACETAMINOPHEN 500 MG
975 TABLET ORAL ONCE
Qty: 0 | Refills: 0 | Status: COMPLETED | OUTPATIENT
Start: 2017-11-15 | End: 2017-11-15

## 2017-11-15 RX ORDER — ONDANSETRON 8 MG/1
4 TABLET, FILM COATED ORAL ONCE
Qty: 0 | Refills: 0 | Status: COMPLETED | OUTPATIENT
Start: 2017-11-15 | End: 2017-11-15

## 2017-11-15 RX ADMIN — MORPHINE SULFATE 4 MILLIGRAM(S): 50 CAPSULE, EXTENDED RELEASE ORAL at 17:12

## 2017-11-15 RX ADMIN — SODIUM CHLORIDE 125 MILLILITER(S): 9 INJECTION INTRAMUSCULAR; INTRAVENOUS; SUBCUTANEOUS at 17:12

## 2017-11-15 RX ADMIN — Medication 102 MILLIGRAM(S): at 15:20

## 2017-11-15 RX ADMIN — Medication 250 MILLIGRAM(S): at 17:11

## 2017-11-15 RX ADMIN — ONDANSETRON 4 MILLIGRAM(S): 8 TABLET, FILM COATED ORAL at 17:11

## 2017-11-15 RX ADMIN — SODIUM CHLORIDE 3 MILLILITER(S): 9 INJECTION INTRAMUSCULAR; INTRAVENOUS; SUBCUTANEOUS at 15:20

## 2017-11-15 RX ADMIN — SODIUM CHLORIDE 2000 MILLILITER(S): 9 INJECTION INTRAMUSCULAR; INTRAVENOUS; SUBCUTANEOUS at 15:02

## 2017-11-15 RX ADMIN — SODIUM CHLORIDE 2000 MILLILITER(S): 9 INJECTION INTRAMUSCULAR; INTRAVENOUS; SUBCUTANEOUS at 15:19

## 2017-11-15 RX ADMIN — HYDROMORPHONE HYDROCHLORIDE 1 MILLIGRAM(S): 2 INJECTION INTRAMUSCULAR; INTRAVENOUS; SUBCUTANEOUS at 17:12

## 2017-11-15 RX ADMIN — Medication 400 MILLIGRAM(S): at 22:46

## 2017-11-15 RX ADMIN — CEFTRIAXONE 100 GRAM(S): 500 INJECTION, POWDER, FOR SOLUTION INTRAMUSCULAR; INTRAVENOUS at 15:02

## 2017-11-15 RX ADMIN — SODIUM CHLORIDE 125 MILLILITER(S): 9 INJECTION INTRAMUSCULAR; INTRAVENOUS; SUBCUTANEOUS at 19:48

## 2017-11-15 RX ADMIN — Medication 1000 MILLIGRAM(S): at 23:00

## 2017-11-15 RX ADMIN — Medication 262 MILLIGRAM(S): at 22:44

## 2017-11-15 RX ADMIN — SODIUM CHLORIDE 2000 MILLILITER(S): 9 INJECTION INTRAMUSCULAR; INTRAVENOUS; SUBCUTANEOUS at 15:20

## 2017-11-15 RX ADMIN — Medication 975 MILLIGRAM(S): at 15:20

## 2017-11-15 RX ADMIN — MORPHINE SULFATE 4 MILLIGRAM(S): 50 CAPSULE, EXTENDED RELEASE ORAL at 15:02

## 2017-11-15 RX ADMIN — HYDROMORPHONE HYDROCHLORIDE 1 MILLIGRAM(S): 2 INJECTION INTRAMUSCULAR; INTRAVENOUS; SUBCUTANEOUS at 15:50

## 2017-11-15 RX ADMIN — SODIUM CHLORIDE 2000 MILLILITER(S): 9 INJECTION INTRAMUSCULAR; INTRAVENOUS; SUBCUTANEOUS at 15:18

## 2017-11-15 NOTE — H&P ADULT - NSHPSOCIALHISTORY_GEN_ALL_CORE
Pt is a  and recently had a contact with students who contracted 5th disease, viral respiratory infection, and pneumonia (per patient).  Denies tobacco use.  Denies illegal drug use.

## 2017-11-15 NOTE — H&P ADULT - HISTORY OF PRESENT ILLNESS
Pt with hypotension at baseline 90/60    -crepitation at the site of LP f/u urgent MRI    acyclovir  vanc + ceftri    mycoplasma igM serum    mycoplasma pcr csf    lactic acid csf    repeat procalcitonin  +    mri lumbar spine with contrast +    lyme serology      herpes pcr - csf pcr  +      [id number]  [id number]    toradol iv 15 q6 32F from CT with PMH of lumbar disc herniation (s/p epidural injection on 8/17) who was at [facility] on 11/13 for severe HA, neck pain, fever, weakness, myalgias, back pain radiating to bl thighs.     Pt reports first experiencing similar symptoms 10 days ago (went to [facility] in [place] - neg w/u: LP, lumbar MRI w/contr, head CT, West Nile virus - discharged w/supportive tx + fiorocet).     Pt then went to Dr Carty and was sent to ED.  At [facility], neg MRI brain, neg RVP, neg BCx, neg HIV, evaluated by ID and Neuro - discharged home and felt better until 11/14 in pm - worsening of headache, photophobia and neck stiffness. She denied any other symptoms.  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   Denied: recent travel, IV drug use, unprotected intercourse, any rashes or bites, invasive procedures other than epidural injection in 08/2017.  In the ER, pt s/p IV hydromorphone, c/o migraine-like headache, neck stiffness, photophobia, chills. ROS otherwise negative.    -crepitation at the site of LP f/u urgent MRI    acyclovir  vanc + ceftri    mycoplasma igM serum    mycoplasma pcr csf    lactic acid csf    repeat procalcitonin  +    mri lumbar spine with contrast +    lyme serology      herpes pcr - csf pcr  + 32F from CT with PMH of lumbar disc herniation (s/p epidural injection on 8/17) who was at [facility] on 11/13 for severe HA partially relieved by lying down, neck pain, fever, weakness, myalgias, back pain radiating to bl thighs.     Pt reports first experiencing similar symptoms 10 days ago (went to [facility] in [place] - neg w/u: LP, lumbar MRI w/contr, head CT, West Nile virus - discharged w/supportive tx + fiorocet).     Pt then went to Dr Carty and was sent to ED.  At [facility], neg MRI brain, neg RVP, neg BCx, neg HIV, evaluated by ID and Neuro - discharged home and felt better until 11/14 in pm - worsening of headache, photophobia and neck stiffness. She denied any other symptoms.  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   Denied: recent travel, IV drug use, unprotected intercourse, any rashes or bites, invasive procedures other than epidural injection in 08/2017.  In the ER, pt s/p IV hydromorphone and IVF, w/partial relief of headache; neck stiffness, photophobia, chills. ROS otherwise negative. Requesting food.  VS in ED: T(F): 101.5  HR: 108  BP: 89/57 RR: 16 SpO2: 98%

## 2017-11-15 NOTE — PROGRESS NOTE ADULT - ASSESSMENT
Meningitis   Clinical course not typical of bacterial process and prior work up did not yield any bacterial pathogen   Suspected viral/atypical process  But today CSF very inflammatory with predominance of PMNs  LBP    RECOMMEND  Continue IV Ceftriaxone 2 gm q 12 hours and IV Vancomycin  Add Acyclovir 600 mg IV q 8 hours  Check CSF lactate level and assure Herpes viruses PCR and other viral PCRs were sent on CSF  Recheck procalcitonin, serum and CSF cryptococcal Ag  Check serum Mycoplasma IgM and CSF Mycoplasma PCR  Agree with plans to re-image the lumbar spine  Lyme serology

## 2017-11-15 NOTE — ED ADULT NURSE NOTE - OBJECTIVE STATEMENT
pt presents to ED A&Ox3 c/o headache, neck and back pain shooting down b/l legs, fever and weakness. pt was admitted to [facility] for r/o meningitis, was dc'd home and was feeling better until last night. pt sent in by dr Carty. pt tearful, anxious.

## 2017-11-15 NOTE — H&P ADULT - PROBLEM SELECTOR PLAN 1
Pt with previously negative workup now with PMN predominance on CSF and s/s of meningitis   - IV Ceftriaxone 2 g  q12h   - IV Vancomycin 1 g  q12h   - IV Acyclovir 600 mg  q8h   - Morphine 2 mg IV prn severe pain (FYI: pt hypotensive in 90s/60s at baseline)  - f/u serologies   - f/u lumbar MRI   - f/u repeat procalcitonin   - no growth on BCx x 48 hrs

## 2017-11-15 NOTE — ED ADULT TRIAGE NOTE - OTHER COMPLAINTS
pt c.o headache, neck pain, fever and increased weakness. sent from [facility] for r/o meningitis. denies n/v/d. pt febrile and tachycardic in triage. mask applied.

## 2017-11-15 NOTE — H&P ADULT - NSHPLABSRESULTS_GEN_ALL_CORE
Cerebrospinal Fluid Cell Count-1 (11.15.17 @ 17:35)    CSF Segmented Neutrophils: 86 %    CSF Monocytes/Macrophages: 4 %    CSF Comments: XANTOCHROMIA     NEG.    CSF Lymphocytes: 10 %    Total Nucleated Cell Count, CSF: 1742 /uL    CSF Color: No Color    CSF Appearance: Clear    Culture - CSF with Gram Stain . (11.15.17 @ 17:49)    Gram Stain:   Many White blood cells  No organisms seen    Specimen Source: .CSF CSF    CSF PCR Panel (11.15.17 @ 17:38)    CSF PCR Result: NotDetec: The meningitis/encephalitis (ME) panel (CSFPCR) is a PCR based assay that  screens for: Escherichia coli; Haemophilus influenzae; Listeria  monocytogenes; Neisseria meningitidis; Streptococcus agalactiae;  Streptococcus pneumoniae; CMV; Enterovirus; HSV-1; HSV-2; Human  herpesvirus 6; Parechovirus; VZV and Cryptococcus. Result should be  interpreted in context of clinical presentation, imaging and other lab  tests. Positive predictive value may be lower in patients with normal CSF  chemistry and cell count.    Xray Chest 1 View AP -PORTABLE-Routine (11.15.17 @ 19:11)     No acute pulmonary pathology.

## 2017-11-15 NOTE — H&P ADULT - PROBLEM SELECTOR PLAN 2
likely 2/2 meningitis   - abx + acyclovir   - f/u serologies   - f/u lumbar MRI   - f/u repeat procalcitonin   - unable to perform mycoplasma CSF PCR 2/2 insufficient amount of fluid

## 2017-11-15 NOTE — ED PROVIDER NOTE - PROGRESS NOTE DETAILS
Pt discussed w Dr Carty - he recommends lp and connecting with Dr Sylvester. Dr Sylvester paged.  Pt discussed w Dr Lopez - agrees to admit to him for further eval. Pt discussed w Dr Carty - he recommends lp and connecting with Dr Miller. Dr Miller paged.  Pt discussed w Dr Lopez - agrees to admit to him for further eval. Pt discussed with Dr Miller - recommends csf eval for mycoplasma, hsv, viral panel. Pt feeling and looking much better  - sitting up in bed, easily interacting w me and eating a sandwich.  Pt noted to have crepitus lumbar spine during lp w/o ttp, erythema, warmth.  Findings shared w Dr Carty and Angela as well as floor team - they will arrange mri lumbar spine w iv contrast since new findings on exam despite neg mri lumbar spine.  LP w + many wbc w pmn predominance.  Gram stain pending.  Results discussed w Dr Carty and Dr Miller - Dr Miller wants to add acyclovir q8 and cont ceftriaxone q 12 and vanco q 24; instructions passed on to inpt team.  Pt leaving dept now so acyclovir and mri not ordered from ed.  ED RN informed of plans so she can update floor rn.

## 2017-11-15 NOTE — H&P ADULT - ASSESSMENT
32F w/PMH lumbar disk herniation s/p epidural injection 8/17 presenting with 10 days of worsening headache, photophobia, neck stiffness, myalgias, fever, weakness, bl shooting back pain, after negative w/u at [facility] (10 days ago) and [facility] 2 days ago discharged on Fiorocet (last dose 11am on 11/15) now with PMN predominance on LP but no identified organisms on GS and negative PCR, clear CXR, pending urgent lumbar MRI (was found to have some crepitus at lumbar spine during LP) and further workup.   Pt started on IV abx (ceftriaxone, vanc) + acyclovir  Of note, contact with sick kids - 5th disease, URI/pneumonia.

## 2017-11-15 NOTE — ED PROVIDER NOTE - MEDICAL DECISION MAKING DETAILS
Pt w c/o ha w recent w/u neg for meningitis, West Nile at [place] w ? viral vs post lp ha on admission here 2 d ago, dc after neg labs, mri and improved sx returns today w fever, ha, neck pain.  Pt meets sepsis criteria - sepsis code called, abx for empiric meningitis treatment ordered, ivf.  Plan labs, cx, lp if pt allows (discussed risks and benefits, pt currently undecided but may refuse despite discussing the importance of the study), pain meds, antipyretic, discuss w John Ulloa Zivaski.

## 2017-11-15 NOTE — ED PROVIDER NOTE - CONSTITUTIONAL, MLM
normal... ill appearing, well nourished, awake, alert, oriented to person, place, time/situation and in painful distress.

## 2017-11-15 NOTE — PROGRESS NOTE ADULT - SUBJECTIVE AND OBJECTIVE BOX
INTERVAL HPI  Patient known to me from prior admission  Interim hx reviewed with the patient, her mother and Dr Merino, and Dr Director  OROPEZA appears different  S/P LP   Now more sedated with pain medications        MEDICATIONS  (STANDING):  sodium chloride 0.9%. 1000 milliLiter(s) (125 mL/Hr) IV Continuous <Continuous>    MEDICATIONS  (PRN):  acetaminophen   Tablet 650 milliGRAM(s) Oral every 6 hours PRN For Temp greater than 38 C (100.4 F)  acetaminophen   Tablet. 650 milliGRAM(s) Oral every 6 hours PRN Mild Pain (1 - 3)  ondansetron Injectable 4 milliGRAM(s) IV Push every 6 hours PRN Nausea      Allergies    aspirin (Hives; Swelling)  lidocaine (Short breath)    EXAM  Vital Signs Last 24 Hrs  T(C): 37.6 (15 Nov 2017 15:50), Max: 38.6 (15 Nov 2017 14:15)  T(F): 99.6 (15 Nov 2017 15:50), Max: 101.5 (15 Nov 2017 14:15)  HR: 93 (15 Nov 2017 19:17) (93 - 108)  BP: 92/56 (15 Nov 2017 19:17) (92/56 - 116/79)  BP(mean): --  RR: 16 (15 Nov 2017 19:17) (16 - 17)  SpO2: 98% (15 Nov 2017 19:17) (98% - 99%)  Groggy post pain medications but awakens and responds appropriately   Nec with moderate stiffness  EOMI   No rash   No oral lesions  RRR  Chest CTA  Abd soft ND NT  LE no edema  Tenderness at LP site    LABS:                        13.1   11.4  )-----------( 244      ( 15 Nov 2017 15:11 )             38.7     11-15    138  |  96  |  12  ----------------------------<  88  3.9   |  27  |  0.69    Ca    9.4      15 Nov 2017 15:11    TPro  7.8  /  Alb  4.3  /  TBili  0.2  /  DBili  x   /  AST  21  /  ALT  33  /  AlkPhos  65  11-15    PT/INR - ( 15 Nov 2017 15:11 )   PT: 11.9 sec;   INR: 1.07          PTT - ( 15 Nov 2017 15:11 )  PTT:41.0 sec  Urinalysis Basic - ( 15 Nov 2017 16:30 )    Color: Yellow / Appearance: Clear / SG: <=1.005 / pH: x  Gluc: x / Ketone: NEGATIVE  / Bili: Negative / Urobili: 0.2 E.U./dL   Blood: x / Protein: NEGATIVE mg/dL / Nitrite: NEGATIVE   Leuk Esterase: NEGATIVE / RBC: x / WBC x   Sq Epi: x / Non Sq Epi: x / Bacteria: x    Cerebrospinal Fluid Cell Count-1 (11.15.17 @ 17:35)    Total Nucleated Cell Count, CSF: 1742 /uL    CSF Color: No Color    CSF Appearance: Clear    CSF Comments: XANTOCHROMIA     NEG.    CSF Lymphocytes: 10 %    CSF Monocytes/Macrophages: 4 %    CSF Segmented Neutrophils: 86 %    Protein, CSF (11.15.17 @ 17:34)    Protein, CSF: 111 mg/dL    Glucose, CSF (11.15.17 @ 17:34)    Glucose, CSF: 55 mg/dL    Cerebrospinal Fluid Cell Count-1 (11.15.17 @ 17:34)    CSF Segmented Neutrophils: 78 %    CSF Monocytes/Macrophages: 6 %    CSF Lymphocytes: 16 %    CSF Comments: XANTOCHROMIA     NEG.    CSF Appearance: Clear    CSF Color: No Color    Total Nucleated Cell Count, CSF: 1615 /uL    Lactate, Blood (11.15.17 @ 15:11)    Lactate, Blood: 1.0 mmoL/L    Procalcitonin, Serum (11.14.17 @ 20:30)    Procalcitonin, Serum: <0.05: Procalcitonin (PCT) Interpretation (ng/mL) - Diagnosis of systemic  bacterial infection/sepsis  PCT < 0.5: Systemic infection (sepsis) is not likely and risk for  progression to severe systemic infection is low. Local bacterial  infection is possible. If early sepsis is suspected clinically, PCT  should be re-assessed in 6-24 hours.  PCT >/= 0.5 but < 2.0: Systemic infection (sepsis) is possible, but other  conditions are known to elevate PCT as well. Moderate risk for  progression to severe systemic infection. The patient should be closely  monitored both clinically and by re-assessing PCT within 6-24 hours.  PCT >/= 2.0 but < 10.0: Systemic infection (sepsis) is likely, unless  other causes are known. High risk of progression to severe systemic  infection (severe sepsis/septic shock).  PCT >/= 10.0: Important systemic inflammatory response, almost  exclusively due to severe bacterial sepsis or septic shock. High  likelihood of severe sepsis or septic shock. ng/mL        MICROBIOLOGY:  CSF GS pending  Cultures pending    Culture - Blood (11.13.17 @ 18:51)    Specimen Source: .Blood Blood    Culture Results:   No growth at 2 days.    Culture - Blood (11.13.17 @ 18:51)    Specimen Source: .Blood Blood    Culture Results:   No growth at 2 days.

## 2017-11-15 NOTE — H&P ADULT - NSHPPHYSICALEXAM_GEN_ALL_CORE
Constitutional: WDWN tearful, sedated, and apathetic young female  HEENT PERRL, EOMI, sclera non-icteric; + photophobia;  hearing intact; throat clear nonerythematous  Respiratory: CTA b/l, good air entry b/l, no wheezing, no rhonchi, no rales, without accessory muscle use and no intercostal retractions  Cardiovascular: tachycardic  Gastrointestinal: soft, NTND, no masses palpable, BS normal  Extremities: Warm, well perfused, pulses equal bilateral upper and lower extremities, no edema, no clubbing  Neurological: AAOx3, CN Grossly intact, ROTH, no focal neurodeficits  neck ROM (passive and active) limited 2/2 stiffness and pain; back pain on straight leg raise bl  Skin: Normal temperature, warm, dry; no obvious rashes; + crepitus at the lumbar back (LP area)  Psych: sedated and tearful, pleasant and cooperative

## 2017-11-15 NOTE — CONSULT NOTE ADULT - SUBJECTIVE AND OBJECTIVE BOX
Patient is a 32y old  Female who presents with a chief complaint of HA Photophobia no back pain, nausea-  When she went home yesterday, she was asymptomatic - since discharge- increase headache      HPI:      Allergies  aspirin (Hives; Swelling)  lidocaine (Short breath)      Health Issues  Handoff  MEWS Score  Lumbar disc herniation  No pertinent past medical history  No significant past surgical history  HEAD AND NECK PAIN  1        FAMILY HISTORY:      MEDICATIONS  (STANDING):  ondansetron Injectable 4 milliGRAM(s) IV Push once  sodium chloride 0.9%. 1000 milliLiter(s) (125 mL/Hr) IV Continuous <Continuous>  vancomycin  IVPB 1000 milliGRAM(s) IV Intermittent once    MEDICATIONS  (PRN):      PAST MEDICAL & SURGICAL HISTORY:  Lumbar disc herniation  No significant past surgical history      Labs                          13.1   11.4  )-----------( 244      ( 15 Nov 2017 15:11 )             38.7     11-15    138  |  96  |  12  ----------------------------<  88  3.9   |  27  |  0.69    Ca    9.4      15 Nov 2017 15:11    TPro  7.8  /  Alb  4.3  /  TBili  0.2  /  DBili  x   /  AST  21  /  ALT  33  /  AlkPhos  65  11-15      Radiology:    Physical Exam    MENTAL STATUS  -Level of Consciousness- awake and in pain    Orientation- person, place time  Language- aphasia/ dysarthria  Memory- recent and remote      Cranial Nerve 1- 12  Pupils- equal and reactive  Eye movements-nl  Facial - no asymmetry   Lower CN-nl    Gait and Station-n/a    MOTOR  Upper-nl  Lower-nl    Reflexes-    Sensation    Cerebellar-nl    vascular -intact    Assessment- HA- R/O meningitis    Plan - LP- ID consult

## 2017-11-15 NOTE — ED PROVIDER NOTE - OBJECTIVE STATEMENT
33 yo female h/o lumbar disc herniation s/p epidural injection 8/17 seen 2 d ago for c/o resolved shooting pains bilat thighs, gen weakness, fever, ha and neck pain w continued myalgias and ha.  Pt initially w onset of sx last week s/p eval at [facility] in [place] w neg mri spine w iv contrast, ct head, lp, labs including West Nile virus and was discharged w supportive care and fiorocet.  Pt went to Dr Carty for continued ha and gen weakness, myalgias sx and sent to ed for eval 2 d ago.  Pt reported no fever x 2 d.  No photophobia.  Ha worse w standing up but was like this prior to lp done 4 d ago.  No uri sx, cough, sob, rash, cp, palpitations.  Pt had epigastric pain that started after taking medrol dose pack x 2 d last wk for what she thought was her disc related pain but stopped after 2 d and sx began to improve.  No travel, sick contacts.  Pt was admitted to [facility] w neg mri brain, neg rvp, neg hiv, neg blood cx; eval by id and neuro and improved sx - was discharged home and was feeling well until last pm w recurrence of mild ha pain similar to prev, much worse this am and neck stiffness.  Pt unaware she has fever today.  No numbness, weakness, + photophobia, no n/v, no rash.  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   No relief w fiorocet today - last dose 11am.

## 2017-11-16 DIAGNOSIS — A87.9 VIRAL MENINGITIS, UNSPECIFIED: ICD-10-CM

## 2017-11-16 DIAGNOSIS — R51 HEADACHE: ICD-10-CM

## 2017-11-16 DIAGNOSIS — Z88.6 ALLERGY STATUS TO ANALGESIC AGENT: ICD-10-CM

## 2017-11-16 DIAGNOSIS — G97.1 OTHER REACTION TO SPINAL AND LUMBAR PUNCTURE: ICD-10-CM

## 2017-11-16 LAB
ALBUMIN SERPL ELPH-MCNC: 3.4 G/DL — SIGNIFICANT CHANGE UP (ref 3.3–5)
ALP SERPL-CCNC: 47 U/L — SIGNIFICANT CHANGE UP (ref 40–120)
ALT FLD-CCNC: 43 U/L — SIGNIFICANT CHANGE UP (ref 10–45)
ANION GAP SERPL CALC-SCNC: 11 MMOL/L — SIGNIFICANT CHANGE UP (ref 5–17)
AST SERPL-CCNC: 24 U/L — SIGNIFICANT CHANGE UP (ref 10–40)
BASOPHILS NFR BLD AUTO: 0.4 % — SIGNIFICANT CHANGE UP (ref 0–2)
BILIRUB SERPL-MCNC: <0.2 MG/DL — SIGNIFICANT CHANGE UP (ref 0.2–1.2)
BUN SERPL-MCNC: 7 MG/DL — SIGNIFICANT CHANGE UP (ref 7–23)
CALCIUM SERPL-MCNC: 8.4 MG/DL — SIGNIFICANT CHANGE UP (ref 8.4–10.5)
CHLORIDE SERPL-SCNC: 105 MMOL/L — SIGNIFICANT CHANGE UP (ref 96–108)
CO2 SERPL-SCNC: 24 MMOL/L — SIGNIFICANT CHANGE UP (ref 22–31)
CREAT SERPL-MCNC: 0.5 MG/DL — SIGNIFICANT CHANGE UP (ref 0.5–1.3)
CULTURE RESULTS: NO GROWTH — SIGNIFICANT CHANGE UP
EOSINOPHIL NFR BLD AUTO: 1.4 % — SIGNIFICANT CHANGE UP (ref 0–6)
GLUCOSE SERPL-MCNC: 115 MG/DL — HIGH (ref 70–99)
HCT VFR BLD CALC: 32.5 % — LOW (ref 34.5–45)
HGB BLD-MCNC: 10.6 G/DL — LOW (ref 11.5–15.5)
LYMPHOCYTES # BLD AUTO: 35.1 % — SIGNIFICANT CHANGE UP (ref 13–44)
MAGNESIUM SERPL-MCNC: 2.1 MG/DL — SIGNIFICANT CHANGE UP (ref 1.6–2.6)
MCHC RBC-ENTMCNC: 29 PG — SIGNIFICANT CHANGE UP (ref 27–34)
MCHC RBC-ENTMCNC: 32.6 G/DL — SIGNIFICANT CHANGE UP (ref 32–36)
MCV RBC AUTO: 89 FL — SIGNIFICANT CHANGE UP (ref 80–100)
MONOCYTES NFR BLD AUTO: 10 % — SIGNIFICANT CHANGE UP (ref 2–14)
NEUTROPHILS NFR BLD AUTO: 53.1 % — SIGNIFICANT CHANGE UP (ref 43–77)
PLATELET # BLD AUTO: 238 K/UL — SIGNIFICANT CHANGE UP (ref 150–400)
POTASSIUM SERPL-MCNC: 3.4 MMOL/L — LOW (ref 3.5–5.3)
POTASSIUM SERPL-SCNC: 3.4 MMOL/L — LOW (ref 3.5–5.3)
PROCALCITONIN SERPL-MCNC: <0.05 NG/ML — SIGNIFICANT CHANGE UP (ref 0–0.04)
PROT SERPL-MCNC: 6.1 G/DL — SIGNIFICANT CHANGE UP (ref 6–8.3)
RBC # BLD: 3.65 M/UL — LOW (ref 3.8–5.2)
RBC # FLD: 12.2 % — SIGNIFICANT CHANGE UP (ref 10.3–16.9)
SODIUM SERPL-SCNC: 140 MMOL/L — SIGNIFICANT CHANGE UP (ref 135–145)
SPECIMEN SOURCE: SIGNIFICANT CHANGE UP
WBC # BLD: 10.5 K/UL — SIGNIFICANT CHANGE UP (ref 3.8–10.5)
WBC # FLD AUTO: 10.5 K/UL — SIGNIFICANT CHANGE UP (ref 3.8–10.5)

## 2017-11-16 PROCEDURE — 70553 MRI BRAIN STEM W/O & W/DYE: CPT | Mod: 26

## 2017-11-16 PROCEDURE — 72158 MRI LUMBAR SPINE W/O & W/DYE: CPT | Mod: 26

## 2017-11-16 RX ORDER — VANCOMYCIN HCL 1 G
1000 VIAL (EA) INTRAVENOUS ONCE
Qty: 0 | Refills: 0 | Status: COMPLETED | OUTPATIENT
Start: 2017-11-16 | End: 2017-11-16

## 2017-11-16 RX ORDER — ACETAMINOPHEN 500 MG
650 TABLET ORAL ONCE
Qty: 0 | Refills: 0 | Status: COMPLETED | OUTPATIENT
Start: 2017-11-16 | End: 2017-11-16

## 2017-11-16 RX ORDER — HYDROMORPHONE HYDROCHLORIDE 2 MG/ML
0.5 INJECTION INTRAMUSCULAR; INTRAVENOUS; SUBCUTANEOUS ONCE
Qty: 0 | Refills: 0 | Status: DISCONTINUED | OUTPATIENT
Start: 2017-11-16 | End: 2017-11-16

## 2017-11-16 RX ORDER — ACETAMINOPHEN 500 MG
1000 TABLET ORAL ONCE
Qty: 0 | Refills: 0 | Status: COMPLETED | OUTPATIENT
Start: 2017-11-16 | End: 2017-11-16

## 2017-11-16 RX ORDER — HYDROMORPHONE HYDROCHLORIDE 2 MG/ML
1 INJECTION INTRAMUSCULAR; INTRAVENOUS; SUBCUTANEOUS ONCE
Qty: 0 | Refills: 0 | Status: DISCONTINUED | OUTPATIENT
Start: 2017-11-16 | End: 2017-11-16

## 2017-11-16 RX ORDER — OXYCODONE HYDROCHLORIDE 5 MG/1
5 TABLET ORAL EVERY 4 HOURS
Qty: 0 | Refills: 0 | Status: DISCONTINUED | OUTPATIENT
Start: 2017-11-16 | End: 2017-11-17

## 2017-11-16 RX ORDER — OXYCODONE HYDROCHLORIDE 5 MG/1
10 TABLET ORAL EVERY 4 HOURS
Qty: 0 | Refills: 0 | Status: DISCONTINUED | OUTPATIENT
Start: 2017-11-16 | End: 2017-11-17

## 2017-11-16 RX ORDER — POTASSIUM CHLORIDE 20 MEQ
20 PACKET (EA) ORAL
Qty: 0 | Refills: 0 | Status: COMPLETED | OUTPATIENT
Start: 2017-11-16 | End: 2017-11-16

## 2017-11-16 RX ADMIN — Medication 250 MILLIGRAM(S): at 18:18

## 2017-11-16 RX ADMIN — Medication 20 MILLIEQUIVALENT(S): at 09:51

## 2017-11-16 RX ADMIN — OXYCODONE HYDROCHLORIDE 10 MILLIGRAM(S): 5 TABLET ORAL at 17:31

## 2017-11-16 RX ADMIN — CEFTRIAXONE 100 GRAM(S): 500 INJECTION, POWDER, FOR SOLUTION INTRAMUSCULAR; INTRAVENOUS at 15:48

## 2017-11-16 RX ADMIN — Medication 262 MILLIGRAM(S): at 14:16

## 2017-11-16 RX ADMIN — Medication 250 MILLIGRAM(S): at 04:35

## 2017-11-16 RX ADMIN — Medication 1000 MILLIGRAM(S): at 23:00

## 2017-11-16 RX ADMIN — HYDROMORPHONE HYDROCHLORIDE 0.5 MILLIGRAM(S): 2 INJECTION INTRAMUSCULAR; INTRAVENOUS; SUBCUTANEOUS at 12:50

## 2017-11-16 RX ADMIN — Medication 262 MILLIGRAM(S): at 22:30

## 2017-11-16 RX ADMIN — Medication 20 MILLIEQUIVALENT(S): at 14:16

## 2017-11-16 RX ADMIN — Medication 400 MILLIGRAM(S): at 22:30

## 2017-11-16 RX ADMIN — Medication 262 MILLIGRAM(S): at 06:31

## 2017-11-16 RX ADMIN — Medication 20 MILLIEQUIVALENT(S): at 11:54

## 2017-11-16 RX ADMIN — HYDROMORPHONE HYDROCHLORIDE 0.5 MILLIGRAM(S): 2 INJECTION INTRAMUSCULAR; INTRAVENOUS; SUBCUTANEOUS at 12:35

## 2017-11-16 RX ADMIN — SODIUM CHLORIDE 125 MILLILITER(S): 9 INJECTION INTRAMUSCULAR; INTRAVENOUS; SUBCUTANEOUS at 22:31

## 2017-11-16 RX ADMIN — OXYCODONE HYDROCHLORIDE 10 MILLIGRAM(S): 5 TABLET ORAL at 16:31

## 2017-11-16 RX ADMIN — CEFTRIAXONE 100 GRAM(S): 500 INJECTION, POWDER, FOR SOLUTION INTRAMUSCULAR; INTRAVENOUS at 03:21

## 2017-11-16 RX ADMIN — Medication 650 MILLIGRAM(S): at 06:31

## 2017-11-16 NOTE — PROGRESS NOTE ADULT - PROBLEM SELECTOR PLAN 2
Pt w/SBP<100, leukocytosis, and LP w/neutrophilic predominance, likely 2/2 meningitis. Pt chronically hypotensive w/SBP 90s at baseline, lactate and Cr WNL, CXR and UA neg.   -c/w abx and plan above  -c/w NS 125cc/hr s/p epidural injection  no interventions at this time   - f/u lumbar MRI

## 2017-11-16 NOTE — PROGRESS NOTE ADULT - SUBJECTIVE AND OBJECTIVE BOX
INTERVAL HPI/OVERNIGHT EVENTS:    ANTIBIOTICS/RELEVANT:    MEDICATIONS  (STANDING):  acetaminophen  IVPB. 1000 milliGRAM(s) IV Intermittent once  acyclovir IVPB      acyclovir IVPB 600 milliGRAM(s) IV Intermittent every 8 hours  cefTRIAXone   IVPB 2 Gram(s) IV Intermittent every 12 hours  influenza   Vaccine 0.5 milliLiter(s) IntraMuscular once  sodium chloride 0.9%. 1000 milliLiter(s) (125 mL/Hr) IV Continuous <Continuous>    MEDICATIONS  (PRN):  acetaminophen   Tablet 650 milliGRAM(s) Oral every 6 hours PRN For Temp greater than 38 C (100.4 F)  acetaminophen   Tablet. 650 milliGRAM(s) Oral every 6 hours PRN Mild Pain (1 - 3)  ondansetron Injectable 4 milliGRAM(s) IV Push every 6 hours PRN Nausea  oxyCODONE    IR 10 milliGRAM(s) Oral every 4 hours PRN Severe Pain (7 - 10)  oxyCODONE    IR 5 milliGRAM(s) Oral every 4 hours PRN Moderate Pain (4 - 6)      Allergies    aspirin (Hives; Swelling)  ibuprofen (Rash; Flushing; Hives)  lidocaine (Short breath)    Intolerances        Vital Signs Last 24 Hrs  T(C): 37.9 (16 Nov 2017 20:36), Max: 37.9 (16 Nov 2017 20:36)  T(F): 100.3 (16 Nov 2017 20:36), Max: 100.3 (16 Nov 2017 20:36)  HR: 98 (16 Nov 2017 20:36) (76 - 98)  BP: 101/64 (16 Nov 2017 20:36) (82/51 - 111/62)  BP(mean): --  RR: 17 (16 Nov 2017 20:36) (16 - 18)  SpO2: 98% (16 Nov 2017 20:36) (96% - 98%)    PHYSICAL EXAM:      Constitutional:    Eyes:    ENMT:    Neck:    Breasts:    Back:    Respiratory:    Cardiovascular:    Gastrointestinal:    Genitourinary:    Rectal:    Extremities:    Vascular:    Neurological:    Skin:    Lymph Nodes:    Musculoskeletal:    Psychiatric:        LABS:                        10.6   10.5  )-----------( 238      ( 16 Nov 2017 08:22 )             32.5     11-16    140  |  105  |  7   ----------------------------<  115<H>  3.4<L>   |  24  |  0.50    Ca    8.4      16 Nov 2017 08:22  Mg     2.1     11-16    TPro  6.1  /  Alb  3.4  /  TBili  <0.2  /  DBili  x   /  AST  24  /  ALT  43  /  AlkPhos  47  11-16    PT/INR - ( 15 Nov 2017 15:11 )   PT: 11.9 sec;   INR: 1.07          PTT - ( 15 Nov 2017 15:11 )  PTT:41.0 sec  Urinalysis Basic - ( 15 Nov 2017 16:30 )    Color: Yellow / Appearance: Clear / SG: <=1.005 / pH: x  Gluc: x / Ketone: NEGATIVE  / Bili: Negative / Urobili: 0.2 E.U./dL   Blood: x / Protein: NEGATIVE mg/dL / Nitrite: NEGATIVE   Leuk Esterase: NEGATIVE / RBC: x / WBC x   Sq Epi: x / Non Sq Epi: x / Bacteria: x        MICROBIOLOGY:    RADIOLOGY & ADDITIONAL STUDIES: INTERVAL HPI/OVERNIGHT EVENTS:  Felt better through the day but with HA after MRI  PO intake good and no new issues    MEDICATIONS  (STANDING):  acetaminophen  IVPB. 1000 milliGRAM(s) IV Intermittent once  acyclovir IVPB      acyclovir IVPB 600 milliGRAM(s) IV Intermittent every 8 hours  cefTRIAXone   IVPB 2 Gram(s) IV Intermittent every 12 hours  influenza   Vaccine 0.5 milliLiter(s) IntraMuscular once  sodium chloride 0.9%. 1000 milliLiter(s) (125 mL/Hr) IV Continuous <Continuous>    MEDICATIONS  (PRN):  acetaminophen   Tablet 650 milliGRAM(s) Oral every 6 hours PRN For Temp greater than 38 C (100.4 F)  acetaminophen   Tablet. 650 milliGRAM(s) Oral every 6 hours PRN Mild Pain (1 - 3)  ondansetron Injectable 4 milliGRAM(s) IV Push every 6 hours PRN Nausea  oxyCODONE    IR 10 milliGRAM(s) Oral every 4 hours PRN Severe Pain (7 - 10)  oxyCODONE    IR 5 milliGRAM(s) Oral every 4 hours PRN Moderate Pain (4 - 6)      Allergies    aspirin (Hives; Swelling)  ibuprofen (Rash; Flushing; Hives)  lidocaine (Short breath)    EXAM  Vital Signs Last 24 Hrs  T(C): 37.9 (16 Nov 2017 20:36), Max: 37.9 (16 Nov 2017 20:36)  T(F): 100.3 (16 Nov 2017 20:36), Max: 100.3 (16 Nov 2017 20:36)  HR: 98 (16 Nov 2017 20:36) (76 - 98)  BP: 101/64 (16 Nov 2017 20:36) (82/51 - 111/62)  BP(mean): --  RR: 17 (16 Nov 2017 20:36) (16 - 18)  SpO2: 98% (16 Nov 2017 20:36) (96% - 98%)  On RA  Awake and alert although in discomfort due to HA  Neck still somewhat stiff  Otherwise unchanged      LABS:                        10.6   10.5  )-----------( 238      ( 16 Nov 2017 08:22 )             32.5     11-16    140  |  105  |  7   ----------------------------<  115<H>  3.4<L>   |  24  |  0.50    Ca    8.4      16 Nov 2017 08:22  Mg     2.1     11-16    TPro  6.1  /  Alb  3.4  /  TBili  <0.2  /  DBili  x   /  AST  24  /  ALT  43  /  AlkPhos  47  11-16    PT/INR - ( 15 Nov 2017 15:11 )   PT: 11.9 sec;   INR: 1.07          PTT - ( 15 Nov 2017 15:11 )  PTT:41.0 sec  Urinalysis Basic - ( 15 Nov 2017 16:30 )    Color: Yellow / Appearance: Clear / SG: <=1.005 / pH: x  Gluc: x / Ketone: NEGATIVE  / Bili: Negative / Urobili: 0.2 E.U./dL   Blood: x / Protein: NEGATIVE mg/dL / Nitrite: NEGATIVE   Leuk Esterase: NEGATIVE / RBC: x / WBC x   Sq Epi: x / Non Sq Epi: x / Bacteria: x    Procalcitonin, Serum (11.16.17 @ 15:28)    Procalcitonin, Serum: <0.05: Procalcitonin (PCT) Interpretation (ng/mL) - Diagnosis of systemic  bacterial infection/sepsis  PCT < 0.5: Systemic infection (sepsis) is not likely and risk for  progression to severe systemic infection is low. Local bacterial  infection is possible. If early sepsis is suspected clinically, PCT  should be re-assessed in 6-24 hours.  PCT >/= 0.5 but < 2.0: Systemic infection (sepsis) is possible, but other  conditions are known to elevate PCT as well. Moderate risk for  progression to severe systemic infection. The patient should be closely  monitored both clinically and by re-assessing PCT within 6-24 hours.  PCT >/= 2.0 but < 10.0: Systemic infection (sepsis) is likely, unless  other causes are known. High risk of progression to severe systemic  infection (severe sepsis/septic shock).  PCT >/= 10.0: Important systemic inflammatory response, almost  exclusively due to severe bacterial sepsis or septic shock. High  likelihood of severe sepsis or septic shock. ng/mL      MICROBIOLOGY:    Culture - Urine (11.15.17 @ 23:52)    Specimen Source: .Urine Clean Catch (Midstream)    Culture Results:   No growth    Culture - CSF with Gram Stain . (11.15.17 @ 17:49)    Gram Stain:   Many White blood cells  No organisms seen    Specimen Source: .CSF CSF    Culture Results:   No growth to date  Culture in progress    Culture - Blood (11.15.17 @ 17:04)    Specimen Source: .Blood Blood-Peripheral    Culture Results:   No growth at 1 day.    Culture - Blood (11.15.17 @ 17:04)    Specimen Source: .Blood Blood-Peripheral    Culture Results:   No growth at 1 day.    RADIOLOGY & ADDITIONAL STUDIES:    Brain and lumbar spine MRIs just performed  Results pending

## 2017-11-16 NOTE — PROGRESS NOTE ADULT - ASSESSMENT
32F w/PMH lumbar disk herniation s/p epidural injection 8/17 and recent neg w/u for similar presentation at [facility] (11/6) and [facility] (11/14) presenting with worsening headache, photophobia, neck stiffness, myalgias, fever, weakness, bl shooting back pain, now with PMN predominance on LP but no identified organisms on GS and negative PCR, clear CXR, pending lumbar MRI and further workup. 32F w/PMH lumbar disk herniation s/p epidural injection 8/17 and recent neg w/u for similar presentation at [facility] (11/6) and [facility] (11/14) presenting with worsening headache, photophobia, neck stiffness, myalgias, fever, weakness, bl shooting back pain, now with PMN predominance on LP but no identified organisms on GS and negative PCR, clear CXR, pending MRI head and spine and further workup.

## 2017-11-16 NOTE — PROGRESS NOTE ADULT - PROBLEM SELECTOR PLAN 4
F: NS 125cc/hr  E: Replete PRN  N: Reg diet  PPX: SQH, Droplet precautions    Full Code  DISPO: [facility] F: NS 125cc/hr  E: Replete PRN  N: Reg diet  PPX: No VTE ppx indicated, IMPROVE score 0; Droplet precautions    Full Code  DISPO: [facility]

## 2017-11-16 NOTE — PROGRESS NOTE ADULT - PROBLEM SELECTOR PLAN 1
Pt with previously negative workup, now with PMN predominance on CSF and s/s of meningitis.  - c/w ceftriaxone 2g IV q12h, vancomycin 1g IV q12h, acyclovir 600mg IV q8h for empiric coverage of infectious meningitis  -c/w morphine 2 mg IV prn severe pain  -f/u CSF studies; plasma CSF PCR 2/2 insufficient amount of fluid  -f/u serologies  -f/u lumbar MRI  -f/u repeat procalcitonin  -BCx NGTD Pt with previously negative workup, now with PMN predominance on CSF and s/s of meningitis. Pt w/Tmax 103.5 and LP s/f neutrophilic predominance, suggestive of bacterial etiology; however, CSF studies still pending.  - c/w ceftriaxone 2g IV q12h, vancomycin 1g IV q12h, acyclovir 600mg IV q8h for empiric coverage of infectious meningitis; Dr. Miller following  -c/w morphine 2 mg IV prn severe pain; Dr. Osborne following  -resend [place] viral blood  -f/u CSF studies; plasma CSF PCR 2/2 insufficient amount of fluid  -f/u serologies  -f/u lumbar MRI  -f/u repeat procalcitonin  -BCx NGTD Pt with previously negative workup, now with PMN predominance on CSF and s/s of meningitis. Pt w/Tmax 103.5 and LP s/f neutrophilic predominance, suggestive of bacterial etiology; however, CSF studies still pending.  - c/w ceftriaxone 2g IV q12h, vancomycin 1g IV q12h, acyclovir 600mg IV q8h for empiric coverage of infectious meningitis; Dr. Miller following  -dilauded 1mg IVP x1 severe pain; Dr. Osborne following, appreciate recs for continued pain management  -resend [place] viral blood  -f/u CSF studies; plasma CSF PCR 2/2 insufficient amount of fluid  -f/u serologies  -f/u lumbar MRI  -f/u repeat procalcitonin  -BCx NGTD Pt with previously negative workup, now with PMN predominance on CSF and s/s of meningitis. Pt w/Tmax 103.5 and LP s/f neutrophilic predominance, suggestive of bacterial etiology; however, CSF studies still pending.  - c/w ceftriaxone 2g IV q12h, vancomycin 1g IV q12h, acyclovir 600mg IV q8h for empiric coverage of infectious meningitis; Dr. Miller following  -s/p dilauded 0.5mg IVP x1   -Dr. Osborne following for pain management; recommends oxycodone IR 5mg q4hr PRN for moderate pain and 10mg q4hr PRN for severe pain  -resend [place] viral blood  -f/u CSF studies; plasma CSF PCR 2/2 insufficient amount of fluid  -f/u serologies  -f/u lumbar MRI  -f/u repeat procalcitonin  -BCx NGTD Pt with previously negative workup, now with PMN predominance on CSF and s/s of meningitis. Pt w/Tmax 103.5 and LP s/f neutrophilic predominance, suggestive of bacterial etiology; however, CSF studies still pending.  - c/w ceftriaxone 2g IV q12h, vancomycin 1g IV q12h, acyclovir 600mg IV q8h for empiric coverage of infectious meningitis; Dr. Miller following  -s/p dilauded 0.5mg IVP x1   -Dr. Osborne following for pain management; recommends oxycodone IR 5mg q4hr PRN for moderate pain and 10mg q4hr PRN for severe pain  -resend [place] viral blood  -f/u CSF studies; plasma CSF PCR 2/2 insufficient amount of fluid  -f/u serologies  -f/u lumbar MRI  -f/u repeat procalcitonin  -BCx NGTD  -Contact Liza Paz NP at [facility], concerning hospital course and indication for abx PPx; per ID: there is not enough information at this time to warrant recommendations for antibiotic PPx. Mobile [phone], Work [phone]

## 2017-11-16 NOTE — PROGRESS NOTE ADULT - PROBLEM SELECTOR PLAN 3
s/p epidural injection  no interventions at this time   - f/u lumbar MRI F: NS 125cc/hr  E: Replete PRN  N: Reg diet  PPX: No VTE ppx indicated, IMPROVE score 0; Droplet precautions    Full Code  DISPO: [facility]

## 2017-11-16 NOTE — PROGRESS NOTE ADULT - SUBJECTIVE AND OBJECTIVE BOX
Neurology Follow up note    Name  JIM WILKINS    HPI:  32[place] with PMH of lumbar disc herniation (s/p epidural injection on 8/17) who was at [facility] on 11/13 for severe HA partially relieved by lying down, neck pain, fever, weakness, myalgias, back pain radiating to bl thighs.     Pt reports first experiencing similar symptoms 10 days ago (went to [facility] in [place] - neg w/u: LP, lumbar MRI w/contr, head CT, [place] virus - discharged w/supportive tx + fiorocet).     Pt then went to Dr Carty and was sent to ED.  At [facility], neg MRI brain, neg RVP, neg BCx, neg HIV, evaluated by ID and Neuro - discharged home and felt better until 11/14 in pm - worsening of headache, photophobia and neck stiffness. She denied any other symptoms.  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   Denied: recent travel, IV drug use, unprotected intercourse, any rashes or bites, invasive procedures other than epidural injection in 08/2017.  In the ER, pt s/p IV hydromorphone and IVF, w/partial relief of headache; neck stiffness, photophobia, chills. ROS otherwise negative. Requesting food.  VS in ED: T(F): 101.5  HR: 108  BP: 89/57 RR: 16 SpO2: 98% (15 Nov 2017 19:50)      Interval History -no new radicular symptoms with persistent headaches         REVIEW OF SYSTEMS    Vital Signs Last 24 Hrs  T(C): 36.9 (16 Nov 2017 06:04), Max: 38.6 (15 Nov 2017 14:15)  T(F): 98.4 (16 Nov 2017 06:04), Max: 101.5 (15 Nov 2017 14:15)  HR: 80 (16 Nov 2017 06:04) (80 - 108)  BP: 90/56 (16 Nov 2017 06:04) (82/51 - 116/79)  BP(mean): --  RR: 16 (16 Nov 2017 06:04) (16 - 17)  SpO2: 98% (16 Nov 2017 06:04) (97% - 99%)    Physical Exam-     Mental Status- awake- no aphasia    Cranial Nerves-nl    Gait and station-no foot drop    Motor-nl    Reflexes-nl    Sensation-nl    Coordination-nl    Vascular -intact    Medications  acetaminophen   Tablet 650 milliGRAM(s) Oral every 6 hours PRN  acetaminophen   Tablet. 650 milliGRAM(s) Oral every 6 hours PRN  acyclovir IVPB      acyclovir IVPB 600 milliGRAM(s) IV Intermittent every 8 hours  cefTRIAXone   IVPB 2 Gram(s) IV Intermittent every 12 hours  influenza   Vaccine 0.5 milliLiter(s) IntraMuscular once  ondansetron Injectable 4 milliGRAM(s) IV Push every 6 hours PRN  sodium chloride 0.9%. 1000 milliLiter(s) IV Continuous <Continuous>  vancomycin  IVPB 1000 milliGRAM(s) IV Intermittent every 12 hours      Lab      Radiology    Assessment- Meningitis     Plan- Rx meningitis    MRI head and lumbar spine

## 2017-11-16 NOTE — PROGRESS NOTE ADULT - SUBJECTIVE AND OBJECTIVE BOX
OVERNIGHT EVENTS: GIFTY    SUBJECTIVE / INTERVAL HPI: Patient seen and examined at bedside. Pt continued to complain of     VITAL SIGNS:  Vital Signs Last 24 Hrs  T(C): 36.9 (16 Nov 2017 06:04), Max: 38.6 (15 Nov 2017 14:15)  T(F): 98.4 (16 Nov 2017 06:04), Max: 101.5 (15 Nov 2017 14:15)  HR: 80 (16 Nov 2017 06:04) (80 - 108)  BP: 90/56 (16 Nov 2017 06:04) (82/51 - 116/79)  BP(mean): --  RR: 16 (16 Nov 2017 06:04) (16 - 17)  SpO2: 98% (16 Nov 2017 06:04) (97% - 99%)    PHYSICAL EXAM:  General: NAD, laying in bed  HEENT: NC/AT; PERRL, anicteric sclera; MMM  Neck: nuchal rigidity  Cardiovascular: +S1/S2; RR; no m/r/g  Respiratory: CTAB; no W/R/R  Gastrointestinal: soft, NT/ND; +BSx4  Extremities: WWP; no edema, clubbing or cyanosis; no tenderness to palpation  Vascular: 2+ distal pulses B/L  Neurological: AAOx3; 5/5 strength of UE and LE b/l; sensation intact and symmetrical throughout; + straight leg test b/l    MEDICATIONS:  MEDICATIONS  (STANDING):  acyclovir IVPB      acyclovir IVPB 600 milliGRAM(s) IV Intermittent every 8 hours  cefTRIAXone   IVPB 2 Gram(s) IV Intermittent every 12 hours  influenza   Vaccine 0.5 milliLiter(s) IntraMuscular once  sodium chloride 0.9%. 1000 milliLiter(s) (125 mL/Hr) IV Continuous <Continuous>  vancomycin  IVPB 1000 milliGRAM(s) IV Intermittent every 12 hours    MEDICATIONS  (PRN):  acetaminophen   Tablet 650 milliGRAM(s) Oral every 6 hours PRN For Temp greater than 38 C (100.4 F)  acetaminophen   Tablet. 650 milliGRAM(s) Oral every 6 hours PRN Mild Pain (1 - 3)  ondansetron Injectable 4 milliGRAM(s) IV Push every 6 hours PRN Nausea      ALLERGIES:  Allergies    aspirin (Hives; Swelling)  lidocaine (Short breath)    Intolerances        LABS:                        13.1   11.4  )-----------( 244      ( 15 Nov 2017 15:11 )             38.7     11-15    138  |  96  |  12  ----------------------------<  88  3.9   |  27  |  0.69    Ca    9.4      15 Nov 2017 15:11    TPro  7.8  /  Alb  4.3  /  TBili  0.2  /  DBili  x   /  AST  21  /  ALT  33  /  AlkPhos  65  11-15    PT/INR - ( 15 Nov 2017 15:11 )   PT: 11.9 sec;   INR: 1.07          PTT - ( 15 Nov 2017 15:11 )  PTT:41.0 sec  Urinalysis Basic - ( 15 Nov 2017 16:30 )    Color: Yellow / Appearance: Clear / SG: <=1.005 / pH: x  Gluc: x / Ketone: NEGATIVE  / Bili: Negative / Urobili: 0.2 E.U./dL   Blood: x / Protein: NEGATIVE mg/dL / Nitrite: NEGATIVE   Leuk Esterase: NEGATIVE / RBC: x / WBC x   Sq Epi: x / Non Sq Epi: x / Bacteria: x      CAPILLARY BLOOD GLUCOSE          RADIOLOGY & ADDITIONAL TESTS: Reviewed. OVERNIGHT EVENTS: GIFTY    SUBJECTIVE / INTERVAL HPI: Patient seen and examined at bedside. Pt continued to complain of     VITAL SIGNS:  Vital Signs Last 24 Hrs  T(C): 36.9 (16 Nov 2017 06:04), Max: 38.6 (15 Nov 2017 14:15)  T(F): 98.4 (16 Nov 2017 06:04), Max: 101.5 (15 Nov 2017 14:15)  HR: 80 (16 Nov 2017 06:04) (80 - 108)  BP: 90/56 (16 Nov 2017 06:04) (82/51 - 116/79)  BP(mean): --  RR: 16 (16 Nov 2017 06:04) (16 - 17)  SpO2: 98% (16 Nov 2017 06:04) (97% - 99%)    PHYSICAL EXAM:  General: NAD, laying in bed  HEENT: NC/AT; PERRL, anicteric sclera; MMM  Neck: nuchal rigidity, Kernig and Brudzinski  Cardiovascular: +S1/S2; RR; no m/r/g  Respiratory: CTAB; no W/R/R  Gastrointestinal: soft, NT/ND; +BSx4  Extremities: WWP; no edema, clubbing or cyanosis; no tenderness to palpation  Vascular: 2+ distal pulses B/L  Neurological: AAOx3; 5/5 strength of UE and LE b/l; sensation intact and symmetrical throughout; + straight leg test b/l    MEDICATIONS:  MEDICATIONS  (STANDING):  acyclovir IVPB      acyclovir IVPB 600 milliGRAM(s) IV Intermittent every 8 hours  cefTRIAXone   IVPB 2 Gram(s) IV Intermittent every 12 hours  influenza   Vaccine 0.5 milliLiter(s) IntraMuscular once  sodium chloride 0.9%. 1000 milliLiter(s) (125 mL/Hr) IV Continuous <Continuous>  vancomycin  IVPB 1000 milliGRAM(s) IV Intermittent every 12 hours    MEDICATIONS  (PRN):  acetaminophen   Tablet 650 milliGRAM(s) Oral every 6 hours PRN For Temp greater than 38 C (100.4 F)  acetaminophen   Tablet. 650 milliGRAM(s) Oral every 6 hours PRN Mild Pain (1 - 3)  ondansetron Injectable 4 milliGRAM(s) IV Push every 6 hours PRN Nausea      ALLERGIES:  Allergies    aspirin (Hives; Swelling)  lidocaine (Short breath)    Intolerances        LABS:                        13.1   11.4  )-----------( 244      ( 15 Nov 2017 15:11 )             38.7     11-15    138  |  96  |  12  ----------------------------<  88  3.9   |  27  |  0.69    Ca    9.4      15 Nov 2017 15:11    TPro  7.8  /  Alb  4.3  /  TBili  0.2  /  DBili  x   /  AST  21  /  ALT  33  /  AlkPhos  65  11-15    PT/INR - ( 15 Nov 2017 15:11 )   PT: 11.9 sec;   INR: 1.07          PTT - ( 15 Nov 2017 15:11 )  PTT:41.0 sec  Urinalysis Basic - ( 15 Nov 2017 16:30 )    Color: Yellow / Appearance: Clear / SG: <=1.005 / pH: x  Gluc: x / Ketone: NEGATIVE  / Bili: Negative / Urobili: 0.2 E.U./dL   Blood: x / Protein: NEGATIVE mg/dL / Nitrite: NEGATIVE   Leuk Esterase: NEGATIVE / RBC: x / WBC x   Sq Epi: x / Non Sq Epi: x / Bacteria: x      CAPILLARY BLOOD GLUCOSE          RADIOLOGY & ADDITIONAL TESTS: Reviewed.

## 2017-11-16 NOTE — PROGRESS NOTE ADULT - SUBJECTIVE AND OBJECTIVE BOX
32[place] with PMH of lumbar disc herniation (s/p epidural injection on 8/17) who was at [facility] on 11/13 for severe HA partially relieved by lying down, neck pain, fever, weakness, myalgias, back pain radiating to bl thighs.     Pt reports first experiencing similar symptoms 10 days ago (went to [facility] in [place] - neg w/u: LP, lumbar MRI w/contr, head CT, West Nile virus - discharged w/supportive tx + fiorocet).     Pt then went to Dr Carty and was sent to ED.  At [facility], neg MRI brain, neg RVP, neg BCx, neg HIV, evaluated by ID and Neuro - discharged home and felt better until 11/14 in pm - worsening of headache, photophobia and neck stiffness. She denied any other symptoms.  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   Denied: recent travel, IV drug use, unprotected intercourse, any rashes or bites, invasive procedures other than epidural injection in 08/2017.  In the ER, pt s/p IV hydromorphone and IVF, w/partial relief of headache; neck stiffness, photophobia, chills. ROS otherwise negative. Requesting food.  VS in ED: T(F): 101.5  HR: 108  BP: 89/57 RR: 16 SpO2: 98%         ICU Vital Signs Last 24 Hrs  T(C): 37.7 (16 Nov 2017 16:18), Max: 37.7 (16 Nov 2017 16:18)  T(F): 99.8 (16 Nov 2017 16:18), Max: 99.8 (16 Nov 2017 16:18)  HR: 76 (16 Nov 2017 16:18) (76 - 93)  BP: 111/62 (16 Nov 2017 16:18) (82/51 - 111/62)  BP(mean): --  ABP: --  ABP(mean): --  RR: 17 (16 Nov 2017 16:18) (16 - 18)  SpO2: 96% (16 Nov 2017 16:18) (96% - 98%)    PHYSICAL EXAM:  General: NAD, laying in bed  HEENT: NC/AT; PERRL, anicteric sclera; MMM  Neck: nuchal rigidity, Kernig and Brudzinski  Cardiovascular: +S1/S2; RR; no m/r/g  Respiratory: CTAB; no W/R/R  Gastrointestinal: soft, NT/ND; +BSx4  Extremities: WWP; no edema, clubbing or cyanosis; no tenderness to palpation  Vascular: 2+ distal pulses B/L  Neurological: AAOx3; 5/5 strength of UE and LE b/l; sensation intact and symmetrical throughout; + straight leg test b/l    MEDICATIONS:  MEDICATIONS  (STANDING):  acyclovir IVPB      acyclovir IVPB 600 milliGRAM(s) IV Intermittent every 8 hours  cefTRIAXone   IVPB 2 Gram(s) IV Intermittent every 12 hours  influenza   Vaccine 0.5 milliLiter(s) IntraMuscular once  sodium chloride 0.9%. 1000 milliLiter(s) (125 mL/Hr) IV Continuous <Continuous>  vancomycin  IVPB 1000 milliGRAM(s) IV Intermittent every 12 hours    MEDICATIONS  (PRN):  acetaminophen   Tablet 650 milliGRAM(s) Oral every 6 hours PRN For Temp greater than 38 C (100.4 F)  acetaminophen   Tablet. 650 milliGRAM(s) Oral every 6 hours PRN Mild Pain (1 - 3)  ondansetron Injectable 4 milliGRAM(s) IV Push every 6 hours PRN Nausea      ALLERGIES:  Allergies    aspirin (Hives; Swelling)  lidocaine (Short breath)    Intolerances        LABS:                        13.1   11.4  )-----------( 244      ( 15 Nov 2017 15:11 )             38.7     11-15    138  |  96  |  12  ----------------------------<  88  3.9   |  27  |  0.69    Ca    9.4      15 Nov 2017 15:11    TPro  7.8  /  Alb  4.3  /  TBili  0.2  /  DBili  x   /  AST  21  /  ALT  33  /  AlkPhos  65  11-15    PT/INR - ( 15 Nov 2017 15:11 )   PT: 11.9 sec;   INR: 1.07          PTT - ( 15 Nov 2017 15:11 )  PTT:41.0 sec  Urinalysis Basic - ( 15 Nov 2017 16:30 )    Color: Yellow / Appearance: Clear / SG: <=1.005 / pH: x  Gluc: x / Ketone: NEGATIVE  / Bili: Negative / Urobili: 0.2 E.U./dL   Blood: x / Protein: NEGATIVE mg/dL / Nitrite: NEGATIVE   Leuk Esterase: NEGATIVE / RBC: x / WBC x   Sq Epi: x / Non Sq Epi: x / Bacteria: x    32F w/PMH lumbar disk herniation s/p epidural injection 8/17 and recent neg w/u for similar presentation at [facility] (11/6) and [facility] (11/14) presenting with worsening headache, photophobia, neck stiffness, myalgias, fever, weakness, bl shooting back pain, now with PMN predominance on LP but no identified organisms on GS and negative PCR, clear CXR, pending MRI head and spine and further workup.         Problem/Plan - 1:  ·  Problem: Meningitis.  Plan: Pt with previously negative workup, now with PMN predominance on CSF and s/s of meningitis. Pt w/Tmax 103.5 and LP s/f neutrophilic predominance, suggestive of bacterial etiology; however, CSF studies still pending.  - c/w ceftriaxone 2g IV q12h, vancomycin 1g IV q12h, acyclovir 600mg IV q8h for empiric coverage of infectious meningitis; Dr. Miller following  -s/p dilauded 0.5mg IVP x1   -Dr. Dylon rodriguez for pain management; recommends oxycodone IR 5mg q4hr PRN for moderate pain and 10mg q4hr PRN for severe pain  -resend west nile viral blood  -f/u CSF studies; plasma CSF PCR 2/2 insufficient amount of fluid  -f/u serologies  -f/u lumbar MRI  -f/u repeat procalcitonin  -BCx NGTD  -Contact Liza Paz NP at [facility], concerning hospital course and indication for abx PPx; per ID: there is not enough information at this time to warrant recommendations for antibiotic PPx. Mobile [phone], Work [phone]. Pt with previously negative workup, now with PMN predominance on CSF and s/s of meningitis. Pt w/Tmax 103.5 and LP s/f neutrophilic predominance, suggestive of bacterial etiology; however, CSF studies still pending.  - c/w ceftriaxone 2g IV q12h, vancomycin 1g IV q12h, acyclovir 600mg IV q8h for empiric coverage of infectious meningitis; Dr. Miller following  -s/p dilauded 0.5mg IVP x1   -Dr. Osborne following for pain management; recommends oxycodone IR 5mg q4hr PRN for moderate pain and 10mg q4hr PRN for severe pain  -resend west nile viral blood  -f/u CSF studies; plasma CSF PCR 2/2 insufficient amount of fluid  -f/u serologies  -f/u lumbar MRI  -f/u repeat procalcitonin  -BCx NGTD     Problem/Plan - 2:  ·  Problem: Sepsis.  Plan: Pt w/SBP<100, leukocytosis, and LP w/neutrophilic predominance, likely 2/2 meningitis. Pt chronically hypotensive w/SBP 90s at baseline, lactate and Cr WNL, CXR and UA neg.   -c/w abx and plan above  -c/w NS 125cc/hr.     Problem/Plan - 3:  ·  Problem: Lumbar disc herniation.  Plan: s/p epidural injection  no interventions at this time   - f/u lumbar MRI.     Problem/Plan - 4:  ·  Problem: Prophylactic measure.  Plan: F: NS 125cc/hr  E: Replete PRN  N: Reg diet  PPX: No VTE ppx indicated, IMPROVE score 0; Droplet precautions

## 2017-11-16 NOTE — CONSULT NOTE ADULT - SUBJECTIVE AND OBJECTIVE BOX
PAIN MANAGEMENT CONSULT NOTE    OVERNIGHT EVENTS:  - Admit to r/o meninigitis s/p Epidural, IVP Dilaudid/IVP Morphine admin in ED.     PLAN/RECOMMENDATIONS:  - For now, not primarily concerned with hypotension, pt has low systolic/diastolic blood pressure, but pt denied side effects of lightheadedness/dizziness in ER yesterday. Is essentially opioid naive. Hypotension likely related to back to back dosings of IVP Dilaudid and Morphine at 2pm/3pm yesterday.   - Started Oxy-IR 5-10mg q4h PRN for Mod-Severe Pain, IV Dilaudid dosing for unrelieved breakthrough pain. Continue with supportive management. If pt refractory to IR dosing, will consider Dilaudid PO. For now no Percocet--do not want to mask temp with PO/IV Tylenol    HPI:  32[place] with PMH of lumbar disc herniation (s/p epidural injection on 8/17) who was at [facility] on 11/13 for severe HA partially relieved by lying down, neck pain, fever, weakness, myalgias, back pain radiating to bl thighs.     Pt reports first experiencing similar symptoms 10 days ago (went to [facility] in [place] - neg w/u: LP, lumbar MRI w/contr, head CT, West Nile virus - discharged w/supportive tx + fiorocet).     Pt then went to Dr Carty and was sent to ED.  At [facility], neg MRI brain, neg RVP, neg BCx, neg HIV, evaluated by ID and Neuro - discharged home and felt better until 11/14 in pm - worsening of headache, photophobia and neck stiffness. She denied any other symptoms.  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   Denied: recent travel, IV drug use, unprotected intercourse, any rashes or bites, invasive procedures other than epidural injection in 08/2017.  In the ER, pt s/p IV hydromorphone and IVF, w/partial relief of headache; neck stiffness, photophobia, chills. ROS otherwise negative. Requesting food.  VS in ED: T(F): 101.5  HR: 108  BP: 89/57 RR: 16 SpO2: 98% (15 Nov 2017 19:50)      PAST MEDICAL & SURGICAL HISTORY:  Lumbar disc herniation  No significant past surgical history      FAMILY HISTORY:  No pertinent family history in first degree relatives      Allergies    aspirin (Hives; Swelling)  ibuprofen (Rash; Flushing; Hives)  lidocaine (Short breath)    Intolerances        PAIN MEDICATIONS:  acetaminophen   Tablet 650 milliGRAM(s) Oral every 6 hours PRN  acetaminophen   Tablet. 650 milliGRAM(s) Oral every 6 hours PRN  ondansetron Injectable 4 milliGRAM(s) IV Push every 6 hours PRN  oxyCODONE    IR 10 milliGRAM(s) Oral every 4 hours PRN  oxyCODONE    IR 5 milliGRAM(s) Oral every 4 hours PRN    Heme:    Antibiotics:  acyclovir IVPB 600 milliGRAM(s) IV Intermittent every 8 hours  acyclovir IVPB      cefTRIAXone   IVPB 2 Gram(s) IV Intermittent every 12 hours  vancomycin  IVPB 1000 milliGRAM(s) IV Intermittent every 12 hours    Cardiovascular:    GI:    Endocrine:    All Other Medications:  influenza   Vaccine 0.5 milliLiter(s) IntraMuscular once  potassium chloride    Tablet ER 20 milliEquivalent(s) Oral every 2 hours  sodium chloride 0.9%. 1000 milliLiter(s) IV Continuous <Continuous>      Vital Signs Last 24 Hrs  T(C): 37.1 (16 Nov 2017 09:00), Max: 38.6 (15 Nov 2017 14:15)  T(F): 98.7 (16 Nov 2017 09:00), Max: 101.5 (15 Nov 2017 14:15)  HR: 85 (16 Nov 2017 09:00) (80 - 108)  BP: 91/62 (16 Nov 2017 09:00) (82/51 - 116/79)  BP(mean): --  RR: 18 (16 Nov 2017 09:00) (16 - 18)  SpO2: 97% (16 Nov 2017 09:00) (97% - 99%)    LABS:                        10.6   10.5  )-----------( 238      ( 16 Nov 2017 08:22 )             32.5     11-16    140  |  105  |  7   ----------------------------<  115<H>  3.4<L>   |  24  |  0.50    Ca    8.4      16 Nov 2017 08:22  Mg     2.1     11-16    TPro  6.1  /  Alb  3.4  /  TBili  <0.2  /  DBili  x   /  AST  24  /  ALT  43  /  AlkPhos  47  11-16    PT/INR - ( 15 Nov 2017 15:11 )   PT: 11.9 sec;   INR: 1.07          PTT - ( 15 Nov 2017 15:11 )  PTT:41.0 sec  Urinalysis Basic - ( 15 Nov 2017 16:30 )    Color: Yellow / Appearance: Clear / SG: <=1.005 / pH: x  Gluc: x / Ketone: NEGATIVE  / Bili: Negative / Urobili: 0.2 E.U./dL   Blood: x / Protein: NEGATIVE mg/dL / Nitrite: NEGATIVE   Leuk Esterase: NEGATIVE / RBC: x / WBC x   Sq Epi: x / Non Sq Epi: x / Bacteria: x        REVIEW OF SYSTEMS:  CONSTITUTIONAL: Fever 101.5 Overnight.  EYES: Today denies visual disturbances, states that since she has been admitted light sensitivity has improved, previously was wearing sun glasses. No eye pain, .  ENMT:  No difficulty hearing. No ringing in ear. No throat pain  NECK: On admit, c/o stiffness & neck pain. Now only with sharp pain with flexion of neck,diffuse sharpness/pressure-like sensation, not localized to neck.  RESPIRATORY: No cough, wheezing; No shortness of breath.   CARDIOVASCULAR: No chest pain, palpitations.   GASTROINTESTINAL: Pt reports passing gas. No bowel movements. No abdominal or epigastric pain. No nausea, vomiting. GENITOURINARY: No dysuria, frequency, or incontinence.   NEUROLOGICAL: Severe generalized headaches, pounding, primarily localized behind eyes, with sharp quality. Pain has been continuous, mildly alleviated with PO Tylenol, completely alleviated with IVP Dilaudid yesterday. Pt denies LOS, denies numbness. C/o back pain and discomfort in b/l LE post Lumbar tap, denies severity or similarity with chronic LE radic pain for which she obtained epidural. No dizziness or lightheadedness with pain medications, some hypotension with IVP Dilaudid/IVP Morphine yesterday.  MUSCULOSKELETAL: Incisional back pain. muscle stiffness/cramping No joint pain or swelling.      FUNCTIONAL ASSESSMENT:  PAIN SCORE AT REST:   8/10      SCALE USED: (1-10 VNRS)  PAIN SCORE WITH ACTIVITY:  No significant increase       SCALE USED: (1-10 VNRS)    PAIN ASSESSMENT:  Severe generalized headaches, pounding, primarily localized behind eyes, with sharp quality. Pain has been continuous, mildly alleviated with PO Tylenol, completely alleviated with IVP Dilaudid yesterday. Pt denies LOS, denies numbness. C/o back pain and discomfort in b/l LE post Lumbar tap, denies severity or similarity with chronic LE radic pain for which she obtained epidural.    PHYSICAL EXAM  GENERAL: NAD  HEAD:  Atraumatic, Normocephalic  NERVOUS SYSTEM:    Alert & Oriented X3, Good concentration;   Cranial nerves grossly intact  Motor exam:         [X] Upper extremity            Bi(c5)  WE(c6)  EE(c7)   FF(c8)                                                R         5/5        5/5        5/5       5/5                                               L          5/5        5/5        5/5       5/5         [X] Lower extremity          HF(l2)   KE(l3)    TA(l4)   EHL(l5)  GS(s1)                                                 R        5/5        5/5        5/5       5/5         5/5                                               L         5/5        5/5       5/5       5/5          5/5                                                    [X] warm well perfused; capillary refill <3 seconds   Sensation intact to LT in UE/LE in 3 dermatomes  Cervical: Slight posterior neck tenderness. Negative Spurlings sign. Negative Putnam's sign. Cervical ROM full, limited on flexion d/t pain.   CHEST/LUNG: Clear to auscultation bilaterally; No rales, rhonchi, wheezing, or rubs  HEART: Regular rate and rhythm; No murmurs, rubs, or gallops  ABDOMEN: Soft, Nontender, Nondistended; Bowel sounds present  EXTREMITIES:  2+ Peripheral Pulses, No clubbing, cyanosis, or edema      ASSESSMENT:   32F  with PMH of lumbar disc herniation (s/p epidural injection on 8/17) who was at [facility] c/o severe HA     PLAN:   1. Opioids  - Started Oxy-IR 5-10mg q4h PRN for Mod-Severe Pain, IV Dilaudid dosing for unrelieved breakthrough pain. Continue with supportive management. If pt refractory to IR dosing, will consider Dilaudid PO. For now no Percocet--do not want to mask temp with PO/IV Tylenol      2. Neuropathics  Pt currently denies neuropathic pain. No numbness/tingling/electric shock like sensation in LE/UE b.l.  - No indication for neuropathic agents.     3. Adjuvants  Pt not complaining of muscle spasms/cramping in neck currently.    - Low threshold for starting Flexeril 5mg TID PRN, pt previously on Tizanidine 2mg q8h PRN by pain doc, w/ severe fatigue. Flexeril preferred if needed. But for now, hold off unless pt c/o severe spasms. Would minimize IV/PO Tylenol to prevent masking of fever, per primary team.    4. Prophylactic:   Bowel regimen: Docusate Senna;    Nausea PRN: Zofran PRN for Nausea, pt does not c/o current    5. Functional Goals:   Pt will get OOB today. Pt will resume previous level of activity without impairment from headache.     6. Additional Consults:   None recommended.     7. Additional Labs/Imaging:   None recommended.     8. Follow up, Discharge Planning:   Patient is set for discharge to: Pending workup  Discharge is pending: Pending workup  Pain Management follow up plan: F/u inpatient

## 2017-11-17 LAB
ANION GAP SERPL CALC-SCNC: 13 MMOL/L — SIGNIFICANT CHANGE UP (ref 5–17)
APPEARANCE CSF: CLEAR — SIGNIFICANT CHANGE UP
BASOPHILS NFR BLD AUTO: 0.7 % — SIGNIFICANT CHANGE UP (ref 0–2)
BUN SERPL-MCNC: 9 MG/DL — SIGNIFICANT CHANGE UP (ref 7–23)
CALCIUM SERPL-MCNC: 8.8 MG/DL — SIGNIFICANT CHANGE UP (ref 8.4–10.5)
CHLORIDE SERPL-SCNC: 101 MMOL/L — SIGNIFICANT CHANGE UP (ref 96–108)
CO2 SERPL-SCNC: 24 MMOL/L — SIGNIFICANT CHANGE UP (ref 22–31)
COLOR CSF: SIGNIFICANT CHANGE UP
CREAT SERPL-MCNC: 0.62 MG/DL — SIGNIFICANT CHANGE UP (ref 0.5–1.3)
CSF COMMENTS: SIGNIFICANT CHANGE UP
CSF PCR RESULT: SIGNIFICANT CHANGE UP
EOSINOPHIL NFR BLD AUTO: 2.2 % — SIGNIFICANT CHANGE UP (ref 0–6)
GLUCOSE CSF-MCNC: 63 MG/DL — SIGNIFICANT CHANGE UP (ref 40–70)
GLUCOSE SERPL-MCNC: 104 MG/DL — HIGH (ref 70–99)
GRAM STN FLD: SIGNIFICANT CHANGE UP
HCT VFR BLD CALC: 34.7 % — SIGNIFICANT CHANGE UP (ref 34.5–45)
HGB BLD-MCNC: 11.4 G/DL — LOW (ref 11.5–15.5)
LYMPHOCYTES # BLD AUTO: 37.1 % — SIGNIFICANT CHANGE UP (ref 13–44)
LYMPHOCYTES # CSF: 63 % — SIGNIFICANT CHANGE UP (ref 40–80)
MAGNESIUM SERPL-MCNC: 2.1 MG/DL — SIGNIFICANT CHANGE UP (ref 1.6–2.6)
MCHC RBC-ENTMCNC: 29.2 PG — SIGNIFICANT CHANGE UP (ref 27–34)
MCHC RBC-ENTMCNC: 32.9 G/DL — SIGNIFICANT CHANGE UP (ref 32–36)
MCV RBC AUTO: 88.7 FL — SIGNIFICANT CHANGE UP (ref 80–100)
MONOCYTES NFR BLD AUTO: 6.1 % — SIGNIFICANT CHANGE UP (ref 2–14)
MONOS+MACROS NFR CSF: 7 % — LOW (ref 15–45)
NEUTROPHILS # CSF: 4 % — SIGNIFICANT CHANGE UP (ref 0–6)
NEUTROPHILS NFR BLD AUTO: 53.9 % — SIGNIFICANT CHANGE UP (ref 43–77)
NRBC NFR CSF: 74 /UL — HIGH (ref 0–5)
PLATELET # BLD AUTO: 248 K/UL — SIGNIFICANT CHANGE UP (ref 150–400)
POTASSIUM SERPL-MCNC: 3.9 MMOL/L — SIGNIFICANT CHANGE UP (ref 3.5–5.3)
POTASSIUM SERPL-SCNC: 3.9 MMOL/L — SIGNIFICANT CHANGE UP (ref 3.5–5.3)
PROT CSF-MCNC: 42 MG/DL — SIGNIFICANT CHANGE UP (ref 15–45)
RBC # BLD: 3.91 M/UL — SIGNIFICANT CHANGE UP (ref 3.8–5.2)
RBC # CSF: 38 /UL — HIGH (ref 0–0)
RBC # FLD: 12.8 % — SIGNIFICANT CHANGE UP (ref 10.3–16.9)
SODIUM SERPL-SCNC: 138 MMOL/L — SIGNIFICANT CHANGE UP (ref 135–145)
SPECIMEN SOURCE: SIGNIFICANT CHANGE UP
TUBE TYPE: SIGNIFICANT CHANGE UP
VANCOMYCIN TROUGH SERPL-MCNC: 7.2 UG/ML — LOW (ref 10–20)
WBC # BLD: 10.5 K/UL — SIGNIFICANT CHANGE UP (ref 3.8–10.5)
WBC # FLD AUTO: 10.5 K/UL — SIGNIFICANT CHANGE UP (ref 3.8–10.5)

## 2017-11-17 PROCEDURE — 99253 IP/OBS CNSLTJ NEW/EST LOW 45: CPT

## 2017-11-17 PROCEDURE — 72156 MRI NECK SPINE W/O & W/DYE: CPT | Mod: 26

## 2017-11-17 PROCEDURE — 99255 IP/OBS CONSLTJ NEW/EST HI 80: CPT

## 2017-11-17 PROCEDURE — 62270 DX LMBR SPI PNXR: CPT

## 2017-11-17 PROCEDURE — 72157 MRI CHEST SPINE W/O & W/DYE: CPT | Mod: 26

## 2017-11-17 PROCEDURE — 77003 FLUOROGUIDE FOR SPINE INJECT: CPT | Mod: 26

## 2017-11-17 RX ORDER — HYDROMORPHONE HYDROCHLORIDE 2 MG/ML
1 INJECTION INTRAMUSCULAR; INTRAVENOUS; SUBCUTANEOUS ONCE
Qty: 0 | Refills: 0 | Status: DISCONTINUED | OUTPATIENT
Start: 2017-11-17 | End: 2017-11-17

## 2017-11-17 RX ORDER — POTASSIUM CHLORIDE 20 MEQ
20 PACKET (EA) ORAL ONCE
Qty: 0 | Refills: 0 | Status: COMPLETED | OUTPATIENT
Start: 2017-11-17 | End: 2017-11-17

## 2017-11-17 RX ORDER — HYDROMORPHONE HYDROCHLORIDE 2 MG/ML
0.5 INJECTION INTRAMUSCULAR; INTRAVENOUS; SUBCUTANEOUS
Qty: 0 | Refills: 0 | Status: DISCONTINUED | OUTPATIENT
Start: 2017-11-17 | End: 2017-11-17

## 2017-11-17 RX ORDER — SODIUM CHLORIDE 9 MG/ML
1000 INJECTION INTRAMUSCULAR; INTRAVENOUS; SUBCUTANEOUS
Qty: 0 | Refills: 0 | Status: COMPLETED | OUTPATIENT
Start: 2017-11-17 | End: 2017-11-17

## 2017-11-17 RX ORDER — HYDROMORPHONE HYDROCHLORIDE 2 MG/ML
0.5 INJECTION INTRAMUSCULAR; INTRAVENOUS; SUBCUTANEOUS
Qty: 0 | Refills: 0 | Status: DISCONTINUED | OUTPATIENT
Start: 2017-11-17 | End: 2017-11-18

## 2017-11-17 RX ORDER — VANCOMYCIN HCL 1 G
1000 VIAL (EA) INTRAVENOUS EVERY 8 HOURS
Qty: 0 | Refills: 0 | Status: DISCONTINUED | OUTPATIENT
Start: 2017-11-17 | End: 2017-11-17

## 2017-11-17 RX ORDER — SODIUM CHLORIDE 9 MG/ML
1000 INJECTION INTRAMUSCULAR; INTRAVENOUS; SUBCUTANEOUS ONCE
Qty: 0 | Refills: 0 | Status: COMPLETED | OUTPATIENT
Start: 2017-11-17 | End: 2017-11-17

## 2017-11-17 RX ORDER — VANCOMYCIN HCL 1 G
1000 VIAL (EA) INTRAVENOUS EVERY 8 HOURS
Qty: 0 | Refills: 0 | Status: DISCONTINUED | OUTPATIENT
Start: 2017-11-17 | End: 2017-11-19

## 2017-11-17 RX ORDER — CHLOROPROCAINE HCL/PF 20 MG/ML
10 VIAL (ML) INJECTION ONCE
Qty: 0 | Refills: 0 | Status: DISCONTINUED | OUTPATIENT
Start: 2017-11-17 | End: 2017-11-17

## 2017-11-17 RX ORDER — SODIUM CHLORIDE 9 MG/ML
1000 INJECTION INTRAMUSCULAR; INTRAVENOUS; SUBCUTANEOUS ONCE
Qty: 0 | Refills: 0 | Status: DISCONTINUED | OUTPATIENT
Start: 2017-11-17 | End: 2017-11-17

## 2017-11-17 RX ADMIN — HYDROMORPHONE HYDROCHLORIDE 0.5 MILLIGRAM(S): 2 INJECTION INTRAMUSCULAR; INTRAVENOUS; SUBCUTANEOUS at 21:32

## 2017-11-17 RX ADMIN — HYDROMORPHONE HYDROCHLORIDE 1 MILLIGRAM(S): 2 INJECTION INTRAMUSCULAR; INTRAVENOUS; SUBCUTANEOUS at 14:39

## 2017-11-17 RX ADMIN — Medication 250 MILLIGRAM(S): at 13:44

## 2017-11-17 RX ADMIN — HYDROMORPHONE HYDROCHLORIDE 1 MILLIGRAM(S): 2 INJECTION INTRAMUSCULAR; INTRAVENOUS; SUBCUTANEOUS at 09:50

## 2017-11-17 RX ADMIN — Medication 250 MILLIGRAM(S): at 06:32

## 2017-11-17 RX ADMIN — Medication 20 MILLIEQUIVALENT(S): at 10:22

## 2017-11-17 RX ADMIN — CEFTRIAXONE 100 GRAM(S): 500 INJECTION, POWDER, FOR SOLUTION INTRAMUSCULAR; INTRAVENOUS at 18:47

## 2017-11-17 RX ADMIN — Medication 650 MILLIGRAM(S): at 05:07

## 2017-11-17 RX ADMIN — SODIUM CHLORIDE 1000 MILLILITER(S): 9 INJECTION INTRAMUSCULAR; INTRAVENOUS; SUBCUTANEOUS at 19:29

## 2017-11-17 RX ADMIN — Medication 262 MILLIGRAM(S): at 06:27

## 2017-11-17 RX ADMIN — Medication 325 MILLIGRAM(S): at 05:07

## 2017-11-17 RX ADMIN — HYDROMORPHONE HYDROCHLORIDE 0.5 MILLIGRAM(S): 2 INJECTION INTRAMUSCULAR; INTRAVENOUS; SUBCUTANEOUS at 21:39

## 2017-11-17 RX ADMIN — Medication 250 MILLIGRAM(S): at 22:06

## 2017-11-17 RX ADMIN — SODIUM CHLORIDE 125 MILLILITER(S): 9 INJECTION INTRAMUSCULAR; INTRAVENOUS; SUBCUTANEOUS at 14:13

## 2017-11-17 RX ADMIN — CEFTRIAXONE 100 GRAM(S): 500 INJECTION, POWDER, FOR SOLUTION INTRAMUSCULAR; INTRAVENOUS at 04:16

## 2017-11-17 RX ADMIN — HYDROMORPHONE HYDROCHLORIDE 1 MILLIGRAM(S): 2 INJECTION INTRAMUSCULAR; INTRAVENOUS; SUBCUTANEOUS at 14:24

## 2017-11-17 RX ADMIN — SODIUM CHLORIDE 500 MILLILITER(S): 9 INJECTION INTRAMUSCULAR; INTRAVENOUS; SUBCUTANEOUS at 21:32

## 2017-11-17 NOTE — CONSULT NOTE ADULT - SUBJECTIVE AND OBJECTIVE BOX
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 Pain was initially managed with PT but she then had epidural injection in 8/2017 with relief of radiation but persistent back pain.  Was in her USOH until 11/6, when while standing, she developed sudden onset of severe shooting pains from groin radiating down legs.  Back pain was as usual.  The next day, she developed fever and HA, described as occipital, radiating to top of head.  She had steroid pack at home – took 5 pills on 11/7, then 2 pills on 11/8, then stopped b/o epigastric pain.  By 11/9, she had rigors and T 103.5.  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  She was d/pallavi home and went to her parents’ home in [place].  She began to have worsening HA, was unable to sit up, with relief when supine.  On 11/13, she saw Dr. Carty who referred to the ED, where she was afebrile with WBC 6.3.  She also was seen by Dr. Miller – both thought HA was due to LP.  Her HA improved with caffeine She was d/pallavi on 11/14 but returned on 11/15 with marked worsening of HA.  In ED she had T 101.5, WBC 11.4.  Repeat LP showed WBC 1742 with [id number], 2RBCs, prot 111, glc 55.  CSF PCR was neg. She was started on vancomycin, ceftriaxone and acyclovir. She had MRI of spine that showed mild lumbar meningeal enhancement with mild diffuse enhancement of cauda equine nerve roots that may be due to infection or recent instrumentation. Her CSF picture was felt to be c/c bacterial meningitis, though course very atypical.  CSF fungal and viral studies were not sent – LP repeated tonight to send these and as f/u.    Throughout her illness, her appetite has been excellent.  Has severe photophobia.  No rhinorrhea, sore throat, cough, CP, SOB, N, V, diarrhea, abd pain, dysuria, hematuria, arthritis/arthralgias, rash.     Ill contacts:  had student with 5th’s disease ~2 weeks ago, returned after rash, also a student with a viral URI.  Exposures:  the weekend before the onset, she went on a road trip through the [facility] where she ate local cheeses and drank Kombucha that was made at a local restaurant.  She has two dogs, as well as exposure to her mother’s dog that had a tick.  No water contact, except walking near home.      Denies any change in vision. Severe photophobia although mom also says that she has baseline sensitivity to light. Admits to pain with EOMs but no diplopia. + pulsating HA.      PMH - Lumbar disc herniation s/p epidural in August 2017  Meds - Vancomycin, ceftriaxone, dilaudid prn, acetaminophen prn,   POH - LASIK  Gtts - None  Allergies - aspirin (Hives; Swelling)  ibuprofen (Rash; Flushing; Hives)  lidocaine (Short breath)    ROS - + HA, photophobia, eye pressure, lower back pain. No nausea, vomiting, chest pain.    Va, sc, near - 20/20-2 OU  Pupils - PERRLA. No APD.  EOMs - Essentially full OU but somewhat limited due to pain with EOM  CVF - Full OU    PLE  LLA - Flat OU  C/S - W&Q OU. No heme.  K - Clear OU  A/C - D&Q OU  Iris - Flat OU  Lens - Clear OU  IOP - 7 mmHg OU by Tonopen @ 10:25.    DFE - Tropicamide 0.5%  C/D -  Macula and peripheral were flat OU. No tears or RD. 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 Pain was initially managed with PT but she then had epidural injection in 8/2017 with relief of radiation but persistent back pain.  Was in her USOH until 11/6, when while standing, she developed sudden onset of severe shooting pains from groin radiating down legs.  Back pain was as usual.  The next day, she developed fever and HA, described as occipital, radiating to top of head.  She had steroid pack at home – took 5 pills on 11/7, then 2 pills on 11/8, then stopped b/o epigastric pain.  By 11/9, she had rigors and T 103.5.  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  She was d/pallavi home and went to her parents’ home in [place].  She began to have worsening HA, was unable to sit up, with relief when supine.  On 11/13, she saw Dr. Carty who referred to the ED, where she was afebrile with WBC 6.3.  She also was seen by Dr. Miller – both thought HA was due to LP.  Her HA improved with caffeine She was d/pallavi on 11/14 but returned on 11/15 with marked worsening of HA.  In ED she had T 101.5, WBC 11.4.  Repeat LP showed WBC 1742 with [id number], 2RBCs, prot 111, glc 55.  CSF PCR was neg. She was started on vancomycin, ceftriaxone and acyclovir. She had MRI of spine that showed mild lumbar meningeal enhancement with mild diffuse enhancement of cauda equine nerve roots that may be due to infection or recent instrumentation. Her CSF picture was felt to be c/c bacterial meningitis, though course very atypical.  CSF fungal and viral studies were not sent – LP repeated tonight to send these and as f/u.    Throughout her illness, her appetite has been excellent.  Has severe photophobia.  No rhinorrhea, sore throat, cough, CP, SOB, N, V, diarrhea, abd pain, dysuria, hematuria, arthritis/arthralgias, rash.     Ill contacts:  had student with 5th’s disease ~2 weeks ago, returned after rash, also a student with a viral URI.  Exposures:  the weekend before the onset, she went on a road trip through the [facility] where she ate local cheeses and drank Kombucha that was made at a local restaurant.  She has two dogs, as well as exposure to her mother’s dog that had a tick.  No water contact, except walking near home.      Denies any change in vision. Severe photophobia although mom also says that she has baseline sensitivity to light. Admits to pain with EOMs but no diplopia. + pulsating HA.      PMH - Lumbar disc herniation s/p epidural in August 2017  Meds - Vancomycin, ceftriaxone, dilaudid prn, acetaminophen prn,   POH - LASIK  Gtts - None  Allergies - aspirin (Hives; Swelling)  ibuprofen (Rash; Flushing; Hives)  lidocaine (Short breath)    ROS - + HA, photophobia, eye pressure, lower back pain. No nausea, vomiting, chest pain.    Va, sc, near - 20/20-2 OU  Pupils - PERRLA. No APD.  EOMs - Essentially full OU but somewhat limited due to pain with EOM  CVF - Full OU  Color - 12/12 OU.    PLE  LLA - Flat OU  C/S - W&Q OU. No heme.  K - Clear OU  A/C - D&Q OU  Iris - Flat OU  Lens - Clear OU  IOP - 7 mmHg OU by Tonopen @ 10:25 pm.    DFE - Tropicamide 0.5%  C/D - 0.3-0.4 OU. Disc margins sharp. No optic disc edema.  Macula and peripheral were flat OU. No tears or RD. No retinal hemorrhages OU.

## 2017-11-17 NOTE — PROGRESS NOTE ADULT - SUBJECTIVE AND OBJECTIVE BOX
Patient seen today at AM with Dr Lopez and the Resident    INTERVAL HPI/OVERNIGHT EVENTS:  Reviewed  Subjectively with HA worse with eye movement but able to move eyes  Still relying on pain medication but able to move her neck better this AM  Per Dr Merino - neurologically unchanged as discussed this AM  Urinating OK  Potential unique exposures reviewed again: no travel; has healthy 2 dogs and pt's Mom states "she kisses them"  No cats, no birds no other animals  No raw meats but eats sushi    MEDICATIONS  (STANDING):  cefTRIAXone   IVPB 2 Gram(s) IV Intermittent every 12 hours  influenza   Vaccine 0.5 milliLiter(s) IntraMuscular once  Nesacaine (Chloroprocaine Hcl) 2% Inj. 10 milliLiter(s) 10 milliLiter(s) Local Injection once  sodium chloride 0.9% Bolus 1000 milliLiter(s) IV Bolus once  sodium chloride 0.9%. 1000 milliLiter(s) (125 mL/Hr) IV Continuous <Continuous>  vancomycin  IVPB 1000 milliGRAM(s) IV Intermittent every 8 hours    MEDICATIONS  (PRN):  acetaminophen   Tablet 650 milliGRAM(s) Oral every 6 hours PRN For Temp greater than 38 C (100.4 F)  acetaminophen   Tablet. 650 milliGRAM(s) Oral every 6 hours PRN Mild Pain (1 - 3)  ondansetron Injectable 4 milliGRAM(s) IV Push every 6 hours PRN Nausea  oxyCODONE    IR 10 milliGRAM(s) Oral every 4 hours PRN Severe Pain (7 - 10)  oxyCODONE    IR 5 milliGRAM(s) Oral every 4 hours PRN Moderate Pain (4 - 6)      Allergies    aspirin (Hives; Swelling)  ibuprofen (Rash; Flushing; Hives)  lidocaine (Short breath)    EXAM  Vital Signs Last 24 Hrs  T(C): 37.2 (17 Nov 2017 09:00), Max: 37.9 (16 Nov 2017 20:36)  T(F): 99 (17 Nov 2017 09:00), Max: 100.3 (16 Nov 2017 20:36)  HR: 71 (17 Nov 2017 09:00) (71 - 98)  BP: 89/57 (17 Nov 2017 09:00) (83/50 - 111/62)  BP(mean): --  RR: 18 (17 Nov 2017 09:00) (17 - 18)  SpO2: 98% (17 Nov 2017 09:00) (96% - 99%)  Awake and alert  Tearful and feels stressed but appears appropriate and converses normally  Neck more supple  No rash  RRR  Chest clear  Abd soft NT      LABS:                        11.4   10.5  )-----------( 248      ( 17 Nov 2017 07:53 )             34.7     11-17    138  |  101  |  9   ----------------------------<  104<H>  3.9   |  24  |  0.62    Ca    8.8      17 Nov 2017 07:53  Mg     2.1     11-17    TPro  6.1  /  Alb  3.4  /  TBili  <0.2  /  DBili  x   /  AST  24  /  ALT  43  /  AlkPhos  47  11-16      Urinalysis Basic - ( 15 Nov 2017 16:30 )    Color: Yellow / Appearance: Clear / SG: <=1.005 / pH: x  Gluc: x / Ketone: NEGATIVE  / Bili: Negative / Urobili: 0.2 E.U./dL   Blood: x / Protein: NEGATIVE mg/dL / Nitrite: NEGATIVE   Leuk Esterase: NEGATIVE / RBC: x / WBC x   Sq Epi: x / Non Sq Epi: x / Bacteria: x    Lactate Dehydrogenase, CSF (11.16.17 @ 19:31)    Lactate Dehydrogenase, CSF: 162: Reference Ranges have NOT been established for CSF LDH.  The  has not determined the efficacy of this test when  performed on CSF specimens. The performance characteristics of this test  were determined by [facility]. U/L      MICROBIOLOGY:    Culture - Urine (11.15.17 @ 23:52)    Specimen Source: .Urine Clean Catch (Midstream)    Culture Results:   No growth    Culture - CSF with Gram Stain . (11.15.17 @ 17:49)    Gram Stain:   Many White blood cells  No organisms seen    Specimen Source: .CSF CSF    Culture Results:   No growth to date    Culture - Blood (11.15.17 @ 17:04)    Specimen Source: .Blood Blood-Peripheral    Culture Results:   No growth at 1 day.    No other cultures including no fungal cultures done due to insufficient CSF obtained    RADIOLOGY & ADDITIONAL STUDIES:    Brain MRI and Lumbar spine MRI reviewed with neuroradiologists       MR Lumbar Spine w/wo IV Cont (11.16.17 @ 20:13) >    EXAM:  MR SPINE LUMBAR WAW IC                          PROCEDURE DATE:  11/16/2017    Quantity of Contrast in Vial in ml: 7.5 Contrast Used: Gadovist  Quantity of Contrast Wasted in ml: 0      INTERPRETATION:  PROCEDURE: MRI of the lumbosacralspine with and without   contrast    INDICATION: Suspected meningitis    TECHNIQUE: Sagittal T1, T2, STIR, and axial T1 and T2 weighted images of   the lumbosacral spine were obtained. After the uneventful administration   of intravenous gadolinium based contrast, axial and sagittal T1 images of   the lumbosacral spine were obtained.    COMPARISON: None    FINDINGS: The MRI exam demonstrates the lumbosacral alignment to be   intact. The vertebral body heights are maintained. There is desiccative   signal and mild loss of height of the L4-L5 and L5-S1 intervertebral   discs. The vertebral body marrow signal is appropriate. The conus   medullaris ends at L1/L2.     At the L1/2 level, there is no disc herniation. There is no spinal canal   stenosis or neural foramen narrowing.    At the L2/3 level, there is no disc herniation. There is no spinal canal   stenosis or neural foramen narrowing.    At the L3/4 level, there is no disc herniation. There is no spinal canal   stenosis or neural foramen narrowing.    At the L4/5 level, there is mild diffuse disc bulge with a posterior   annular fissure contacting the ventral thecal sac. There is no spinal   canal stenosis. There is mild bilateral neural foraminal narrowing. No   evidence of spinal canal stenosis.    At the L5/S1 level, there is mild diffuse disc bulge and small shallow   right paracentral disc protrusion flattening the ventral thecal sac and   contacting the right descending S1 nerve root. There is mild bilateral   neural foramen narrowing. No evidence of spinal canal stenosis.    There is mild diffuse enhancement of the cauda equina nerve roots which   is nonspecific and may be related to recent instrumentation versus less   likely inflammatory neuropathy. There is mild diffuse enlargement of the   nerve roots.    IMPRESSION:   1.  Mild meningeal enhancement seen. Mild diffuse enhancement of the   cauda equina nerve roots, that may due to infection or relate to recent   instrumentation .  2.  mild diffuse enlargement of the nerve roots.  3.  Diffuse disc bulge at L4-L5 with a posterior annular tear.   4.  L5-S1 mild diffuse disc bulge and small shallow right paracentral   disc protrusion contacting the right descending S1 nerve root .      MR Head w/wo IV Cont (11.16.17 @ 20:13) >    EXAM:  MR BRAIN WAW IC                          PROCEDURE DATE:  11/16/2017    Quantity of Contrast in Vial in ml: 7.5 Contrast Used: Gadovist  Quantity of Contrast Wasted in ml: 0       INTERPRETATION:  PROCEDURE: MRI Brain with and without contrast    INDICATION: Sepsis, headache. Suspected infectious meningitis.    TECHNIQUE: Sagittal T1, axial T1, T2, FLAIR, diffusion, GRE and coronal   FLAIR images of the brain were obtained. post contrast axial T1, FLAIR,   and sagittal 3-D fat suppressed T1 weighted images with axial and coronal   reformations were also obtained.     COMPARISON: Prior noncontrast brain MRI dated 11/13/2017.    FINDINGS: The MRI examination of the brain demonstrates the ventricles,   cisternal spaces, and cortical sulci to be appropriate for the patient's   stated age. There is no midline shift or extra-axial collection. No   abnormal signal is identified. The diffusion-weighted images demonstrate   no acute ischemia. The GRE images demonstrate no hemorrhagic products.   There is normal vascular flow-voids. The visualized paranasal sinuses are   free of mucosal disease. The mastoid air cells are well-aerated. no   abnormal enhancement is seen.     IMPRESSION: Unremarkable pre and postcontrast MRI of the brain.

## 2017-11-17 NOTE — PROGRESS NOTE ADULT - SUBJECTIVE AND OBJECTIVE BOX
32[place] with PMH of lumbar disc herniation (s/p epidural injection on 8/17) who was at [facility] on 11/13 for severe HA partially relieved by lying down, neck pain, fever, weakness, myalgias, back pain radiating to bl thighs.     Pt reports first experiencing similar symptoms 10 days ago (went to [facility] in [place] - neg w/u: LP, lumbar MRI w/contr, head CT, West Nile virus - discharged w/supportive tx + fiorocet).       Pt then went to Dr Carty and was sent to ED.  At [facility], neg MRI brain, neg RVP, neg BCx, neg HIV, evaluated by ID and Neuro - discharged home and felt better until 11/14 in pm - worsening of headache, photophobia and neck stiffness. She denied any other symptoms.  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   Denied: recent travel, IV drug use, unprotected intercourse, any rashes or bites, invasive procedures other than epidural injection in 08/2017.  In the ER, pt s/p IV hydromorphone and IVF, w/partial relief of headache; neck stiffness, photophobia, chills. ROS otherwise negative. Requesting food.  VS in ED: T(F): 101.5  HR: 108  BP: 89/57 RR: 16 SpO2: 98%     Patient seen and examined at bedside. Pt complaining of continued HA, occular pressure, neck stiffnes, lumbar pain and photophobia..     ICU Vital Signs Last 24 Hrs  T(C): 37.5 (17 Nov 2017 14:00), Max: 37.9 (16 Nov 2017 20:36)  T(F): 99.5 (17 Nov 2017 14:00), Max: 100.3 (16 Nov 2017 20:36)  HR: 91 (17 Nov 2017 14:00) (71 - 98)  BP: 102/66 (17 Nov 2017 14:00) (83/50 - 102/66)  BP(mean): --  ABP: --  ABP(mean): --  RR: 18 (17 Nov 2017 14:00) (17 - 18)  SpO2: 100% (17 Nov 2017 14:00) (98% - 100%)        PHYSICAL EXAM:  General: NAD, laying in bed, face covered by mask w/lights off  HEENT: NC/AT; PERRL, EMOI, anicteric sclera; MMM  Neck: nuchal rigidity, Kernig and Brudzinski positive  Cardiovascular: +S1/S2; RR; no m/r/g  Respiratory: CTAB; no W/R/R  Gastrointestinal: soft, NT/ND; +BSx4  Extremities: WWP; no edema, clubbing or cyanosis; no tenderness to palpation  Vascular: 2+ distal pulses B/L  Neurological: AAOx3;     MEDICATIONS:  MEDICATIONS  (STANDING):  cefTRIAXone   IVPB 2 Gram(s) IV Intermittent every 12 hours  influenza   Vaccine 0.5 milliLiter(s) IntraMuscular once  sodium chloride 0.9%. 1000 milliLiter(s) (125 mL/Hr) IV Continuous <Continuous>  vancomycin  IVPB 1000 milliGRAM(s) IV Intermittent every 8 hours    MEDICATIONS  (PRN):  acetaminophen   Tablet 650 milliGRAM(s) Oral every 6 hours PRN For Temp greater than 38 C (100.4 F)  acetaminophen   Tablet. 650 milliGRAM(s) Oral every 6 hours PRN Mild Pain (1 - 3)  ondansetron Injectable 4 milliGRAM(s) IV Push every 6 hours PRN Nausea  oxyCODONE    IR 10 milliGRAM(s) Oral every 4 hours PRN Severe Pain (7 - 10)  oxyCODONE    IR 5 milliGRAM(s) Oral every 4 hours PRN Moderate Pain (4 - 6)      ALLERGIES:  Allergies    aspirin (Hives; Swelling)  ibuprofen (Rash; Flushing; Hives)  lidocaine (Short breath)    Intolerances        LABS:                        11.4   10.5  )-----------( 248      ( 17 Nov 2017 07:53 )             34.7     11-17    138  |  101  |  9   ----------------------------<  104<H>  3.9   |  24  |  0.62    Ca    8.8      17 Nov 2017 07:53  Mg     2.1     11-17    TPro  6.1  /  Alb  3.4  /  TBili  <0.2  /  DBili  x   /  AST  24  /  ALT  43  /  AlkPhos  47  11-16    PT/INR - ( 15 Nov 2017 15:11 )   PT: 11.9 sec;   INR: 1.07          PTT - ( 15 Nov 2017 15:11 )  PTT:41.0 sec  Urinalysis Basic - ( 15 Nov 2017 16:30 )    Color: Yellow / Appearance: Clear / SG: <=1.005 / pH: x  Gluc: x / Ketone: NEGATIVE  / Bili: Negative / Urobili: 0.2 E.U./dL   Blood: x / Protein: NEGATIVE mg/dL / Nitrite: NEGATIVE   Leuk Esterase: NEGATIVE / RBC: x / WBC x   Sq Epi: x / Non Sq Epi: x / Bacteria: x    < from: MR Lumbar Spine w/wo IV Cont (11.16.17 @ 20:13) >  IMPRESSION:   1.  Mild meningeal enhancement seen. Mild diffuse enhancement of the   cauda equina nerve roots, that may due to infection or relate to recent   instrumentation .  2.  mild diffuse enlargement of the nerve roots.  3.  Diffuse disc bulge at L4-L5 with a posterior annular tear.   4.  L5-S1 mild diffuse disc bulge and small shallow right paracentral   disc protrusion contacting the right descending S1 nerve root .    < from: MR Head w/wo IV Cont (11.16.17 @ 20:13) >        INTERPRETATION:  PROCEDURE: MRI Brain with and without contrast    INDICATION: Sepsis, headache. Suspected infectious meningitis.    TECHNIQUE: Sagittal T1, axial T1, T2, FLAIR, diffusion, GRE and coronal   FLAIR images of the brain were obtained. postcontrast axial T1, FLAIR,   and sagittal 3-D fat suppressed T1 weighted images with axial and coronal   reformations were also obtained.     COMPARISON: Prior noncontrast brain MRI dated 11/13/2017.    FINDINGS: The MRI examination of the brain demonstrates the ventricles,   cisternal spaces, and cortical sulci to be appropriate for the patient's   stated age. There is no midline shift or extra-axial collection. No   abnormal signal is identified. The diffusion-weighted images demonstrate   no acute ischemia. The GRE images demonstrate no hemorrhagic products.   There is normal vascular flow-voids. The visualized paranasal sinuses are   free of mucosal disease. The mastoid air cells are well-aerated. no   abnormal enhancement is seen.     IMPRESSION: Unremarkable pre and postcontrast MRI of the brain.    · Assessment		  32F w/PMH lumbar disk herniation s/p epidural injection 8/17 and recent neg w/u for similar presentation at [facility] (11/6) and [facility] (11/14) presenting with worsening headache, photophobia, neck stiffness, myalgias, fever, weakness, bl shooting back pain, now with PMN predominance on LP but no identified organisms on GS and negative PCR, concerning for infectious meningitis.         Problem/Plan - 1:  ·  Problem: Meningitis.  Plan: Pt with previously negative workup, now with PMN predominance on CSF and s/s of meningitis. Pt w/Tmax 103.5 and LP s/f neutrophilic predominance, suggestive of bacterial etiology; however, CSF PCR panel, CSF bacterial cultures and gram stain here and from [place] are neg.  - c/w ceftriaxone 2g IV q12h, vancomycin 1g IV q8h  - f/u vanc trough before 11/8 5AM dose  - d/c's acyclovir 600mg IV q8h per Dr. Miller  - Service ID consulted as pt's mother would like second opinion; appreciate recs  - Dr. Dlyon rodriguez for pain management; recommends oxycodone IR 5mg q4hr PRN for moderate pain and 10mg q4hr PRN for severe pain; Dilaudid IVP for breakthrough pain; f/u for standing pain medication  -Repeat LP today; pt not clinically improving, and CSF samples for fungal cultures, AFB, cryptococcus, and mycoplasma needed  -f/u CSF and serum Lyme studies from [place] still pending; [phone]   -f/u repeat procalcitonin  -BCx and CSF bacteria cultures NGTD  -Contact Liza Paz, CELIA at [facility], concerning hospital course and indication for abx PPx; per ID: there is not enough information at this time to warrant recommendations for antibiotic PPx. Mobile [phone], Work [phone].     Problem/Plan - 2:  ·  Problem: Lumbar disc herniation.  Plan: s/p epidural injection  no interventions at this time   - f/u lumbar MRI.     Problem/Plan - 3:  ·  Problem: R/O Intracranial hypertension.  Plan: Pt complaining of occular pressure and headache. LP s/f for normal opening pressure with normal MRI head.  -consult ophthalmology; appreciate recs.     Problem/Plan - 4:  ·  Problem: Prophylactic measure.  Plan: F: NS 125cc/hr  E: Replete PRN  N: Reg diet  PPX: No VTE ppx indicated, IMPROVE score 0; Droplet precautions

## 2017-11-17 NOTE — PROGRESS NOTE ADULT - SUBJECTIVE AND OBJECTIVE BOX
Pt attempted visit x2 this PM. Pt not in room.   Please call team [phone] China Blanton NP on week, [phone] Dr. Noah Osborne--attending, if any acute changes in pain control or side effect to current medications. Please call Saturday for update.

## 2017-11-17 NOTE — PROGRESS NOTE ADULT - PROBLEM SELECTOR PLAN 1
Pt with previously negative workup, now with PMN predominance on CSF and s/s of meningitis. Pt w/Tmax 103.5 and LP s/f neutrophilic predominance, suggestive of bacterial etiology; however, CSF PCR panel, CSF bacterial cultures and gram stain here and from [place] are neg.  - c/w ceftriaxone 2g IV q12h, vancomycin 1g IV q8h  - f/u vanc trough before 11/8 5AM dose  - d/c's acyclovir 600mg IV q8h per Dr. Miller  - Service ID consulted as pt's mother would like second opinion; appreciate recs  - Dr. Osborne following for pain management; recommends oxycodone IR 5mg q4hr PRN for moderate pain and 10mg q4hr PRN for severe pain; Dilaudid IVP for breakthrough pain; f/u for standing pain medication  -Repeat LP today; pt not clinically improving, and CSF samples for fungal cultures, AFB, cryptococcus, and mycoplasma needed  -f/u CSF and serum Lyme studies from [place] still pending; [phone]   -f/u repeat procalcitonin  -BCx and CSF bacteria cultures NGTD  -Contact Liza Paz NP at [facility], concerning hospital course and indication for abx PPx; per ID: there is not enough information at this time to warrant recommendations for antibiotic PPx. Mobile [phone], Work [phone] Pt with previously negative workup, now with PMN predominance on CSF and s/s of meningitis. Pt w/Tmax 103.5 and LP s/f neutrophilic predominance, suggestive of bacterial etiology; however, CSF PCR panel, CSF bacterial cultures and gram stain here and from [place] are neg.  - c/w ceftriaxone 2g IV q12h, vancomycin 1g IV q8h  - f/u vanc trough before 11/18 1PM dose  - d/c'd acyclovir 600mg IV q8h per Dr. Miller  - Service ID consulted as pt's mother would like second opinion; appreciate recs  - Dr. Osborne following for pain management; recommends oxycodone IR 5mg q4hr PRN for moderate pain and 10mg q4hr PRN for severe pain; Dilaudid 1mg IVP for breakthrough pain; f/u for standing pain medication  -Repeat IR guided LP today; pt not clinically improving, and CSF samples for fungal cultures, AFB, cryptococcus, and mycoplasma needed  -f/u CSF and serum Lyme studies from [place] still pending; [phone]   -f/u repeat procalcitonin  -BCx and CSF bacteria cultures NGTD  -Contact Liza Paz NP at [facility], concerning hospital course and indication for abx PPx; per ID: there is not enough information at this time to warrant recommendations for antibiotic PPx. Mobile [phone], Work [phone]

## 2017-11-17 NOTE — CONSULT NOTE ADULT - SUBJECTIVE AND OBJECTIVE BOX
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  The patient had fever with severe high lumbar back pain which radiated to the back of both lower extremities in addition to her headache.  A repeat lumbar puncture on this admission demonstrated 1700 white blood cells in the spinal fluid.    Physical exam reveals a well-developed and well-nourished woman in moderate pain line in a dark room with her mother in attendance (her mother is a neurosurgical trauma nurse).  Her vital signs are within normal limits.  The patient is remarkably photophobic.  Examination of her head, ears, eyes, nose, throat is unremarkable.  There is no lymphadenopathy, jugular venous distention or thyroidomegaly in the neck.  The lungs are clear to percussion and auscultation.  There is no axillary or breast pathology bilaterally.  The heart sounds are regular with no auscultatory evidence for murmur, rub,, gallop or ectopy.  The patient has no truncal obesity and there is no palpable or percussible hepatomegaly or splenomegaly.  The patient has no chronic venous stasis changes in the lower extremities.  Neurologically the patient has hypoesthesia in an L12 L3 distribution with positive straight leg raises.  The patient has nuchal rigidity.

## 2017-11-17 NOTE — PROGRESS NOTE ADULT - ASSESSMENT
Meningitis - appears acute and with worsening inflammation/pleocytosis since the first LP at the OSH on 11/9  Hx of epidural steroid injection in August 2017  No typical bacterial or other more common viral pathogens identified  Still suspect infectious process though, but no clear epidemiologic risk factors or exposures   Insufficient CSF to carry out fungal, AFB or further viral studies  There is no improvement of pt's symptoms and I suspect her temp is elevated but likely blunted by pain meds    RECOMMEND  Repeat lumbar puncture   Check opening pressure - communicated   CSF for: cell count and diff, protein, glucose, lactate (not LDH), "CSF PCR," Gram Stain and bacterial cultures, fungal stain and cultures, AFB stain and cultures, viral cultures, Beta-D-Glucan (Fungitell),   Please obtain extra tube of CSF and save it for potential additional studies  Do not discard extra CSF - please communicate with the lab when CSF delivered     Check serology for: Lyme disease including Western Blot IgG and IgM, Rickettsia, Bartonella, Leptospira and assure Mycoplasma IgM/IgG done; Lymphocytic choriomeningitis virus (LCMV)  Legionella urinary Ag  ?Potential immunologic studies - as per Neurology    Second ID opinion and input - Dr Cheng contacted and will see the patient  Continue Ceftriaxone and IV Vanco in the meantime  Check Vanco trough  Consideration for "atypical" coverage with Doxycycline or Azithromycin or FQ

## 2017-11-17 NOTE — PROGRESS NOTE ADULT - SUBJECTIVE AND OBJECTIVE BOX
Neurology Follow up note    Name  JIM WILKINS    HPI:  32[place] with PMH of lumbar disc herniation (s/p epidural injection on 8/17) who was at [facility] on 11/13 for severe HA partially relieved by lying down, neck pain, fever, weakness, myalgias, back pain radiating to bl thighs.     Pt reports first experiencing similar symptoms 10 days ago (went to [facility] in [place] - neg w/u: LP, lumbar MRI w/contr, head CT, West Nile virus - discharged w/supportive tx + fiorocet).     Pt then went to Dr Carty and was sent to ED.  At [facility], neg MRI brain, neg RVP, neg BCx, neg HIV, evaluated by ID and Neuro - discharged home and felt better until 11/14 in pm - worsening of headache, photophobia and neck stiffness. She denied any other symptoms.  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   Denied: recent travel, IV drug use, unprotected intercourse, any rashes or bites, invasive procedures other than epidural injection in 08/2017.  In the ER, pt s/p IV hydromorphone and IVF, w/partial relief of headache; neck stiffness, photophobia, chills. ROS otherwise negative. Requesting food.  VS in ED: T(F): 101.5  HR: 108  BP: 89/57 RR: 16 SpO2: 98% (15 Nov 2017 19:50)      Interval History -continued HA and fever        REVIEW OF SYSTEMS    Vital Signs Last 24 Hrs  T(C): 37.2 (17 Nov 2017 09:00), Max: 37.9 (16 Nov 2017 20:36)  T(F): 99 (17 Nov 2017 09:00), Max: 100.3 (16 Nov 2017 20:36)  HR: 71 (17 Nov 2017 09:00) (71 - 98)  BP: 89/57 (17 Nov 2017 09:00) (83/50 - 111/62)  BP(mean): --  RR: 18 (17 Nov 2017 09:00) (17 - 18)  SpO2: 98% (17 Nov 2017 09:00) (96% - 99%)    Physical Exam-     Mental Status- awake and pain    Cranial Nerves- full EOM    Gait and station-n/a    Motor- moves all 4 extremities    Reflexes- intact    Sensation-    Coordination- no tremors    Vascular -    Medications  acetaminophen   Tablet 650 milliGRAM(s) Oral every 6 hours PRN  acetaminophen   Tablet. 650 milliGRAM(s) Oral every 6 hours PRN  cefTRIAXone   IVPB 2 Gram(s) IV Intermittent every 12 hours  HYDROmorphone  Injectable 1 milliGRAM(s) IV Push once  influenza   Vaccine 0.5 milliLiter(s) IntraMuscular once  ondansetron Injectable 4 milliGRAM(s) IV Push every 6 hours PRN  oxyCODONE    IR 10 milliGRAM(s) Oral every 4 hours PRN  oxyCODONE    IR 5 milliGRAM(s) Oral every 4 hours PRN  potassium chloride    Tablet ER 20 milliEquivalent(s) Oral once  sodium chloride 0.9%. 1000 milliLiter(s) IV Continuous <Continuous>  vancomycin  IVPB 1000 milliGRAM(s) IV Intermittent every 8 hours      Lab      Radiology    Assessment- Meningitis- MRI head and lumbar negative    Plan As per ID

## 2017-11-17 NOTE — CONSULT NOTE ADULT - SUBJECTIVE AND OBJECTIVE BOX
HPI:        PAST MEDICAL & SURGICAL HISTORY:  Lumbar disc herniation  No significant past surgical history          MEDICATIONS  (STANDING):  cefTRIAXone   IVPB 2 Gram(s) IV Intermittent every 12 hours  influenza   Vaccine 0.5 milliLiter(s) IntraMuscular once  Nesacaine (Chloroprocaine Hcl) 2% Inj. 10 milliLiter(s) 10 milliLiter(s) Local Injection once  vancomycin  IVPB 1000 milliGRAM(s) IV Intermittent every 8 hours    MEDICATIONS  (PRN):  acetaminophen   Tablet 650 milliGRAM(s) Oral every 6 hours PRN For Temp greater than 38 C (100.4 F)  acetaminophen   Tablet. 650 milliGRAM(s) Oral every 6 hours PRN Mild Pain (1 - 3)  HYDROmorphone  Injectable 0.5 milliGRAM(s) IV Push every 3 hours PRN Moderate Pain (4 - 6)  ondansetron Injectable 4 milliGRAM(s) IV Push every 6 hours PRN Nausea      Allergies    aspirin (Hives; Swelling)  ibuprofen (Rash; Flushing; Hives)  lidocaine (Short breath)    Intolerances        SOCIAL HISTORY:    FAMILY HISTORY:  No pertinent family history in first degree relatives      Vital Signs Last 24 Hrs  T(C): 37.5 (17 Nov 2017 20:52), Max: 37.7 (17 Nov 2017 04:59)  T(F): 99.5 (17 Nov 2017 20:52), Max: 99.8 (17 Nov 2017 04:59)  HR: 105 (17 Nov 2017 20:52) (71 - 105)  BP: 100/64 (17 Nov 2017 20:52) (83/50 - 102/66)  BP(mean): --  RR: 17 (17 Nov 2017 20:52) (17 - 18)  SpO2: 100% (17 Nov 2017 20:52) (98% - 100%)    PE:  WDWN in no distress  HEENT:  NC, PERRL, sclerae anicteric, conjunctivae clear, EOMI.  Sinuses nontender, no nasal exudate.  No buccal or pharyngeal lesions, erythema or exudate  Neck:  Supple, no adenopathy  Lungs:  Clear to auscultation  Cor:  RRR, S1, S2, no murmur appreciated  Abd:  Symmetric, normoactive BS.  Soft, nontender, no masses, guarding or rebound.  Liver and spleen not enlarged  Extrem:  No cyanosis or edema  Skin:  No rashes.    LABS:                        11.4   10.5  )-----------( 248      ( 17 Nov 2017 07:53 )             34.7     11-17    138  |  101  |  9   ----------------------------<  104<H>  3.9   |  24  |  0.62    Ca    8.8      17 Nov 2017 07:53  Mg     2.1     11-17    TPro  6.1  /  Alb  3.4  /  TBili  <0.2  /  DBili  x   /  AST  24  /  ALT  43  /  AlkPhos  47  11-16        RADIOLOGY & ADDITIONAL STUDIES: HPI:  33 yo F with h/o chronic back pain with meningitis.  Her back pain began in 11/2016 after lifting a student.  Was lower lumbar region, radiating down legs.  Pain was initially managed with PT but she then had epidural injection in 8/2017 with relief of radiation but persistent back pain.  Was in her USOH until 11/6, when while standing, she developed sudden onset of severe shooting pains from groin radiating down legs.  Back pain was as usual.  The next day, she developed fever and HA, described as occipital, radiating to top of head.  She had steroid pack at home – took 5 pills on 11/7, then 2 pills on 11/8, then stopped b/o epigastric pain.  By 11/9, she had rigors and T 103.5.  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  She was d/pallavi home and went to her parents’ home in [place].  She began to have worsening HA, was unable to sit up, with relief when supine.  On 11/13, she saw Dr. Carty who referred to the ED, where she was afebrile with WBC 6.3.  She also was seen by Dr. Miller – both thought HA was due to LP.  Her HA improved with caffeine She was d/pallavi on 11/14 but returned on 11/15 with marked worsening of HA.  In ED she had T 101.5, WBC 11.4.  Repeat LP showed WBC 1742 with [id number], 2RBCs, prot 111, glc 55.  CSF PCR was neg. She was started on vancomycin, ceftriaxone and acyclovir. She had MRI of spine that showed mild lumbar meningeal enhancement with mild diffuse enhancement of cauda equine nerve roots that may be due to infection or recent instrumentation. Her CSF picture was felt to be c/c bacterial meningitis, though course very atypical.  CSF fungal and viral studies were not sent – LP repeated tonight to send these and as f/u.    Throughout her illness, her appetite has been excellent.  Has severe photophobia.  No rhinorrhea, sore throat, cough, CP, SOB, N, V, diarrhea, abd pain, dysuria, hematuria, arthritis/arthralgias, rash.     Ill contacts:  had student with 5th’s disease ~2 weeks ago, returned after rash, also a student with a viral URI.  Exposures:  the weekend before the onset, she went on a road trip through the [facility] where she ate local cheeses and drank Kombucha that was made at a local restaurant.  She has two dogs, as well as exposure to her mother’s dog that had a tick.  No water contact, except walking near home.          PAST MEDICAL & SURGICAL HISTORY:  Lumbar disc herniation  No significant past surgical history          MEDICATIONS  (STANDING):  cefTRIAXone   IVPB 2 Gram(s) IV Intermittent every 12 hours  influenza   Vaccine 0.5 milliLiter(s) IntraMuscular once  Nesacaine (Chloroprocaine Hcl) 2% Inj. 10 milliLiter(s) 10 milliLiter(s) Local Injection once  vancomycin  IVPB 1000 milliGRAM(s) IV Intermittent every 8 hours    MEDICATIONS  (PRN):  acetaminophen   Tablet 650 milliGRAM(s) Oral every 6 hours PRN For Temp greater than 38 C (100.4 F)  acetaminophen   Tablet. 650 milliGRAM(s) Oral every 6 hours PRN Mild Pain (1 - 3)  HYDROmorphone  Injectable 0.5 milliGRAM(s) IV Push every 3 hours PRN Moderate Pain (4 - 6)  ondansetron Injectable 4 milliGRAM(s) IV Push every 6 hours PRN Nausea      Allergies    aspirin (Hives; Swelling)  ibuprofen (Rash; Flushing; Hives)  lidocaine (Short breath)    Intolerances        SOCIAL HISTORY:    FAMILY HISTORY:  No pertinent family history in first degree relatives      Vital Signs Last 24 Hrs  T(C): 37.5 (17 Nov 2017 20:52), Max: 37.7 (17 Nov 2017 04:59)  T(F): 99.5 (17 Nov 2017 20:52), Max: 99.8 (17 Nov 2017 04:59)  HR: 105 (17 Nov 2017 20:52) (71 - 105)  BP: 100/64 (17 Nov 2017 20:52) (83/50 - 102/66)  BP(mean): --  RR: 17 (17 Nov 2017 20:52) (17 - 18)  SpO2: 100% (17 Nov 2017 20:52) (98% - 100%)    PE:  WDWN in no distress  HEENT:  NC, PERRL, sclerae anicteric, conjunctivae clear, EOMI.  Sinuses nontender, no nasal exudate.  No buccal or pharyngeal lesions, erythema or exudate  Neck:  Supple, no adenopathy  Lungs:  Clear to auscultation  Cor:  RRR, S1, S2, no murmur appreciated  Abd:  Symmetric, normoactive BS.  Soft, nontender, no masses, guarding or rebound.  Liver and spleen not enlarged  Extrem:  No cyanosis or edema  Skin:  No rashes.    LABS:                        11.4   10.5  )-----------( 248      ( 17 Nov 2017 07:53 )             34.7     11-17    138  |  101  |  9   ----------------------------<  104<H>  3.9   |  24  |  0.62    Ca    8.8      17 Nov 2017 07:53  Mg     2.1     11-17    TPro  6.1  /  Alb  3.4  /  TBili  <0.2  /  DBili  x   /  AST  24  /  ALT  43  /  AlkPhos  47  11-16        RADIOLOGY & ADDITIONAL STUDIES: HPI:  33 yo F with h/o chronic back pain with meningitis.  Her back pain began in 11/2016 after lifting a student.  Was lower lumbar region, radiating down legs.  Pain was initially managed with PT but she then had epidural injection in 8/2017 with relief of radiation but persistent back pain.  Was in her USOH until 11/6, when while standing, she developed sudden onset of severe shooting pains from groin radiating down legs.  Back pain was as usual.  The next day, she developed fever and HA, described as occipital, radiating to top of head.  She had steroid pack at home – took 5 pills on 11/7, then 2 pills on 11/8, then stopped b/o epigastric pain.  By 11/9, she had rigors and T 103.5.  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  She was d/pallavi home and went to her parents’ home in [place].  She began to have worsening HA, was unable to sit up, with relief when supine.  On 11/13, she saw Dr. Carty who referred to the ED, where she was afebrile with WBC 6.3.  She also was seen by Dr. Miller – both thought HA was due to LP.  Her HA improved with caffeine She was d/pallavi on 11/14 but returned on 11/15 with marked worsening of HA.  In ED she had T 101.5, WBC 11.4.  Repeat LP showed WBC 1742 with [id number], 2RBCs, prot 111, glc 55.  CSF PCR was neg. She was started on vancomycin, ceftriaxone and acyclovir. She had MRI of spine that showed mild lumbar meningeal enhancement with mild diffuse enhancement of cauda equine nerve roots that may be due to infection or recent instrumentation. Her CSF picture was felt to be c/c bacterial meningitis, though course very atypical.  CSF fungal and viral studies were not sent – LP repeated tonight to send these and as f/u.    Throughout her illness, her appetite has been excellent.  Has severe photophobia.  No rhinorrhea, sore throat, cough, CP, SOB, N, V, diarrhea, abd pain, dysuria, hematuria, arthritis/arthralgias, rash.     Ill contacts:  had student with 5th’s disease ~2 weeks ago, returned after rash, also a student with a viral URI.  Exposures:  the weekend before the onset, she went on a road trip through the [facility] where she ate local cheeses and drank Kombucha that was made at a local restaurant.  She has two dogs, as well as exposure to her mother’s dog that had a tick.  No water contact, except walking near home.          PAST MEDICAL & SURGICAL HISTORY:  Lumbar disc herniation  No significant past surgical history          MEDICATIONS  (STANDING):  cefTRIAXone   IVPB 2 Gram(s) IV Intermittent every 12 hours  influenza   Vaccine 0.5 milliLiter(s) IntraMuscular once  Nesacaine (Chloroprocaine Hcl) 2% Inj. 10 milliLiter(s) 10 milliLiter(s) Local Injection once  vancomycin  IVPB 1000 milliGRAM(s) IV Intermittent every 8 hours    MEDICATIONS  (PRN):  acetaminophen   Tablet 650 milliGRAM(s) Oral every 6 hours PRN For Temp greater than 38 C (100.4 F)  acetaminophen   Tablet. 650 milliGRAM(s) Oral every 6 hours PRN Mild Pain (1 - 3)  HYDROmorphone  Injectable 0.5 milliGRAM(s) IV Push every 3 hours PRN Moderate Pain (4 - 6)  ondansetron Injectable 4 milliGRAM(s) IV Push every 6 hours PRN Nausea      Allergies    aspirin (Hives; Swelling)  ibuprofen (Rash; Flushing; Hives)  lidocaine (Short breath)    Intolerances        SOCIAL HISTORY:  Born in [place].  Lived in [place] for 3 years, returned in summer of 2016 - during that time, she traveled extensively throughout [place], including [place] and [place], as well as [place] and [place]. 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    FAMILY HISTORY:  No pertinent family history in first degree relatives      Vital Signs Last 24 Hrs  T(C): 37.5 (17 Nov 2017 20:52), Max: 37.7 (17 Nov 2017 04:59)  T(F): 99.5 (17 Nov 2017 20:52), Max: 99.8 (17 Nov 2017 04:59)  HR: 105 (17 Nov 2017 20:52) (71 - 105)  BP: 100/64 (17 Nov 2017 20:52) (83/50 - 102/66)  BP(mean): --  RR: 17 (17 Nov 2017 20:52) (17 - 18)  SpO2: 100% (17 Nov 2017 20:52) (98% - 100%)    PE:  WDWN, initially crying but distractable  HEENT:  NC, sclerae anicteric, conjunctivae clear.  Pupils not eval b/o photophobia. Sinuses nontender, no nasal exudate.  No buccal or pharyngeal lesions, erythema or exudate  Neck:  Supple, no adenopathy  Lungs:  Clear to auscultation  Cor:  RRR, S1, S2, no murmur appreciated  Abd:  Symmetric, normoactive BS.  Soft, nontender, no masses, guarding or rebound.  Liver and spleen not enlarged  Back:  No vertebral or paraspinous mm tenderness  Extrem:  No cyanosis or edema  Skin:  No rashes.  Neuro:  Alert, O X 4, CN II-XII intact, normal strength    LABS:                        11.4   10.5  )-----------( 248      ( 17 Nov 2017 07:53 )             34.7     11-17    138  |  101  |  9   ----------------------------<  104<H>  3.9   |  24  |  0.62    Ca    8.8      17 Nov 2017 07:53  Mg     2.1     11-17    TPro  6.1  /  Alb  3.4  /  TBili  <0.2  /  DBili  x   /  AST  24  /  ALT  43  /  AlkPhos  47  11-16    CSF 11/17:  WBC 74 [id number]  RBC 38   glc 63 Prot 42    MICROBIOLOGY:  Blood cxs:  11/13 X 2 - NGTD;  11/15 X 2 - NGTD  CXF cxs:  11/15 - many WBCs, no orgs seen, NGTD;  11/17 - mod WBCs, no orgs seen  CXF PCR:  11/15 - ND;  11/17 - ND    HIV 1/2 combo 11/14 - NR    RADIOLOGY & ADDITIONAL STUDIES:  < from: MR Lumbar Spine w/wo IV Cont (11.16.17 @ 20:13) >  IMPRESSION:   1.  Mild meningeal enhancement seen. Mild diffuse enhancement of the   cauda equina nerve roots, that may due to infection or relate to recent   instrumentation .  2.  mild diffuse enlargement of the nerve roots.  3.  Diffuse disc bulge at L4-L5 with a posterior annular tear.   4.  L5-S1 mild diffuse disc bulge and small shallow right paracentral   disc protrusion contacting the right descending S1 nerve root .    < end of copied text >  < from: MR Thoracic Spine w/wo IV Cont (11.17.17 @ 18:40) >  In contiguity with findings on recent lumbar spine MRI, there is abnormal   dural enhancement at the lower thoracic intradural extramedullary   compartment that is likely inflammatory/infectious. CSF analysis is   recommended.    No bony abnormality or fluid collection.     Small disc herniations at T9-10 and T10-11 as above.      < end of copied text >  < from: MR Cervical Spine w/wo IV Cont (11.17.17 @ 18:39) >  No sign of cervical spine infection. Specifically, there is no   intraspinal fluid collection or abnormal enhancement in this patient with   meningitis. The cervical spinal cord is normal.    Normal study aside from incidental left C4 lateral mass hemangioma.    < end of copied text >  < from: MR Head w/wo IV Cont (11.16.17 @ 20:13) >  IMPRESSION: Unremarkable pre and postcontrast MRI of the brain.    < end of copied text >  < from: Xray Chest 1 View AP -PORTABLE-Routine (11.15.17 @ 19:11) >  No acute pulmonary pathology.    < end of copied text > HPI:  31 yo F with h/o chronic back pain with meningitis.  Her back pain began in 11/2016 after lifting a student.  Was lower lumbar region, radiating down legs.  Pain was initially managed with PT but she then had epidural injection in 8/2017 with relief of radiation but persistent back pain.  Was in her USOH until 11/6, when while standing, she developed sudden onset of severe shooting pains from groin radiating down legs.  Back pain was as usual.  The next day, she developed fever and HA, described as occipital, radiating to top of head.  She had steroid pack at home – took 5 pills on 11/7, then 2 pills on 11/8, then stopped b/o epigastric pain.  By 11/9, she had rigors and T 103.5.  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  She was d/pallavi home and went to her parents’ home in [place].  She began to have worsening HA, was unable to sit up, with relief when supine.  On 11/13, she saw Dr. Carty who referred to the ED, where she was afebrile with WBC 6.3.  She also was seen by Dr. Miller – both thought HA was due to LP.  Her HA improved with caffeine She was d/pallavi on 11/14 but returned on 11/15 with marked worsening of HA.  In ED she had T 101.5, WBC 11.4.  Repeat LP showed WBC 1742 with [id number], 2RBCs, prot 111, glc 55.  CSF PCR was neg. She was started on vancomycin, ceftriaxone and acyclovir. She had MRI of spine that showed mild lumbar meningeal enhancement with mild diffuse enhancement of cauda equine nerve roots that may be due to infection or recent instrumentation. Her CSF picture was felt to be c/c bacterial meningitis, though course very atypical.  CSF fungal and viral studies were not sent – LP repeated tonight to send these and as f/u.    Throughout her illness, her appetite has been excellent.  Has severe photophobia and ongoing HA but overall feels better today than yesterday.  No rhinorrhea, sore throat, cough, CP, SOB, N, V, diarrhea, abd pain, dysuria, hematuria, arthritis/arthralgias, rash.     Ill contacts:  had student with 5th’s disease ~2 weeks ago, returned after rash, also a student with a viral URI.  Exposures:  the weekend before the onset, she went on a road trip through the [facility] where she ate local cheeses and drank Kombucha that was made at a local restaurant.  She has two dogs, as well as exposure to her mother’s dog that had a tick.  No water contact, except walking near home.          PAST MEDICAL & SURGICAL HISTORY:  Lumbar disc herniation  No significant past surgical history          MEDICATIONS  (STANDING):  cefTRIAXone   IVPB 2 Gram(s) IV Intermittent every 12 hours  influenza   Vaccine 0.5 milliLiter(s) IntraMuscular once  Nesacaine (Chloroprocaine Hcl) 2% Inj. 10 milliLiter(s) 10 milliLiter(s) Local Injection once  vancomycin  IVPB 1000 milliGRAM(s) IV Intermittent every 8 hours    MEDICATIONS  (PRN):  acetaminophen   Tablet 650 milliGRAM(s) Oral every 6 hours PRN For Temp greater than 38 C (100.4 F)  acetaminophen   Tablet. 650 milliGRAM(s) Oral every 6 hours PRN Mild Pain (1 - 3)  HYDROmorphone  Injectable 0.5 milliGRAM(s) IV Push every 3 hours PRN Moderate Pain (4 - 6)  ondansetron Injectable 4 milliGRAM(s) IV Push every 6 hours PRN Nausea      Allergies    aspirin (Hives; Swelling)  ibuprofen (Rash; Flushing; Hives)  lidocaine (Short breath)    Intolerances        SOCIAL HISTORY:  Born in [place].  Lived in [place] for 3 years, returned in summer of 2016 - during that time, she traveled extensively throughout [place], including [place] and [place], as well as [place] and [place]. 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    FAMILY HISTORY:  No pertinent family history in first degree relatives      Vital Signs Last 24 Hrs  T(C): 37.5 (17 Nov 2017 20:52), Max: 37.7 (17 Nov 2017 04:59)  T(F): 99.5 (17 Nov 2017 20:52), Max: 99.8 (17 Nov 2017 04:59)  HR: 105 (17 Nov 2017 20:52) (71 - 105)  BP: 100/64 (17 Nov 2017 20:52) (83/50 - 102/66)  BP(mean): --  RR: 17 (17 Nov 2017 20:52) (17 - 18)  SpO2: 100% (17 Nov 2017 20:52) (98% - 100%)    PE:  WDWN, initially crying but distractable  HEENT:  NC, sclerae anicteric, conjunctivae clear.  Pupils not eval b/o photophobia. Sinuses nontender, no nasal exudate.  No buccal or pharyngeal lesions, erythema or exudate  Neck:  Supple, no adenopathy  Lungs:  Clear to auscultation  Cor:  RRR, S1, S2, no murmur appreciated  Abd:  Symmetric, normoactive BS.  Soft, nontender, no masses, guarding or rebound.  Liver and spleen not enlarged  Back:  No vertebral or paraspinous mm tenderness  Extrem:  No cyanosis or edema  Skin:  No rashes.  Neuro:  Alert, O X 4, CN II-XII intact, normal strength    LABS:                        11.4   10.5  )-----------( 248      ( 17 Nov 2017 07:53 )             34.7     11-17    138  |  101  |  9   ----------------------------<  104<H>  3.9   |  24  |  0.62    Ca    8.8      17 Nov 2017 07:53  Mg     2.1     11-17    TPro  6.1  /  Alb  3.4  /  TBili  <0.2  /  DBili  x   /  AST  24  /  ALT  43  /  AlkPhos  47  11-16    CSF 11/17:  WBC 74 1V30H0I  RBC 38   glc 63 Prot 42    MICROBIOLOGY:  Blood cxs:  11/13 X 2 - NGTD;  11/15 X 2 - NGTD  CXF cxs:  11/15 - many WBCs, no orgs seen, NGTD;  11/17 - mod WBCs, no orgs seen  CXF PCR:  11/15 - ND;  11/17 - ND    HIV 1/2 combo 11/14 - NR    RADIOLOGY & ADDITIONAL STUDIES:  < from: MR Lumbar Spine w/wo IV Cont (11.16.17 @ 20:13) >  IMPRESSION:   1.  Mild meningeal enhancement seen. Mild diffuse enhancement of the   cauda equina nerve roots, that may due to infection or relate to recent   instrumentation .  2.  mild diffuse enlargement of the nerve roots.  3.  Diffuse disc bulge at L4-L5 with a posterior annular tear.   4.  L5-S1 mild diffuse disc bulge and small shallow right paracentral   disc protrusion contacting the right descending S1 nerve root .    < end of copied text >  < from: MR Thoracic Spine w/wo IV Cont (11.17.17 @ 18:40) >  In contiguity with findings on recent lumbar spine MRI, there is abnormal   dural enhancement at the lower thoracic intradural extramedullary   compartment that is likely inflammatory/infectious. CSF analysis is   recommended.    No bony abnormality or fluid collection.     Small disc herniations at T9-10 and T10-11 as above.      < end of copied text >  < from: MR Cervical Spine w/wo IV Cont (11.17.17 @ 18:39) >  No sign of cervical spine infection. Specifically, there is no   intraspinal fluid collection or abnormal enhancement in this patient with   meningitis. The cervical spinal cord is normal.    Normal study aside from incidental left C4 lateral mass hemangioma.    < end of copied text >  < from: MR Head w/wo IV Cont (11.16.17 @ 20:13) >  IMPRESSION: Unremarkable pre and postcontrast MRI of the brain.    < end of copied text >  < from: Xray Chest 1 View AP -PORTABLE-Routine (11.15.17 @ 19:11) >  No acute pulmonary pathology.    < end of copied text >

## 2017-11-17 NOTE — PROGRESS NOTE ADULT - PROBLEM SELECTOR PLAN 3
Pt complaining of occular pressure and headache. LP s/f for normal opening pressure with normal MRI head.  -consult ophthalmology; appreciate recs

## 2017-11-17 NOTE — CONSULT NOTE ADULT - SUBJECTIVE AND OBJECTIVE BOX
NEUROLOGY INITIAL CONSULT NOTE    CHIEF COMPLAINT:  meninigitis     HPI:  Ms. Barragan is a 32-year-old woman with a past medical history of lumbar disc herniation (s/p epidural injection in 8/2017) who was at [facility] on 11/13 for severe HA partially relieved by lying down, neck pain, fever, weakness, myalgias, back pain radiating to the bilateral thighs.  Pt reports first experiencing similar symptoms 10 days ago (went to [facility] in [place] - negative work-up including:  LP, MRI Lumbar w/contrast, CT Head, West Nile virus - discharged w/supportive tx + Fioricet).  Pt then went to Dr Carty and was sent to ED.  At [facility], MRI brain was negative, neg RVP, neg BCx, neg HIV, evaluated by ID and Neuro - discharged home and felt better until 11/14 in pm - worsening of headache, photophobia and neck stiffness.  She denied any other symptoms.  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   Denied: recent travel, IV drug use, unprotected intercourse, any rashes or bites, invasive procedures other than epidural injection in 08/2017.  In the ER, pt s/p IV hydromorphone and IVF, w/partial relief of headache; neck stiffness, photophobia, chills. ROS otherwise negative. Requesting food.  VS in ED: T(F): 101.5  HR: 108  BP: 89/57 RR: 16 SpO2: 98% (15 Nov 2017 19:50)    PAST MEDICAL & SURGICAL HISTORY:  Lumbar disc herniation s/p ADOLFO in 8/2017  No significant past surgical history    REVIEW OF SYSTEMS:  As per HPI, otherwise negative for Constitutional, Eyes, Ears/Nose/Mouth/Throat, Neck, Cardiovascular, Respiratory, Gastrointestinal, Genitourinary, Skin, Endocrine, Musculoskeletal, Psychiatric, and Hematologic/Lymphatic.    MEDICATIONS  (STANDING):  cefTRIAXone   IVPB 2 Gram(s) IV Intermittent every 12 hours  influenza   Vaccine 0.5 milliLiter(s) IntraMuscular once  Nesacaine (Chloroprocaine Hcl) 2% Inj. 10 milliLiter(s) 10 milliLiter(s) Local Injection once  sodium chloride 0.9% Bolus 1000 milliLiter(s) IV Bolus once  sodium chloride 0.9%. 1000 milliLiter(s) (125 mL/Hr) IV Continuous <Continuous>  vancomycin  IVPB 1000 milliGRAM(s) IV Intermittent every 8 hours    MEDICATIONS  (PRN):  acetaminophen   Tablet 650 milliGRAM(s) Oral every 6 hours PRN For Temp greater than 38 C (100.4 F)  acetaminophen   Tablet. 650 milliGRAM(s) Oral every 6 hours PRN Mild Pain (1 - 3)  ondansetron Injectable 4 milliGRAM(s) IV Push every 6 hours PRN Nausea  oxyCODONE    IR 10 milliGRAM(s) Oral every 4 hours PRN Severe Pain (7 - 10)  oxyCODONE    IR 5 milliGRAM(s) Oral every 4 hours PRN Moderate Pain (4 - 6)    ALLERGIES:   Aspirin (Hives; Swelling)  Ibuprofen (Rash; Flushing; Hives)  Lidocaine (Short breath)    FAMILY HISTORY:  No pertinent family history in first degree relatives.    SOCIAL HISTORY:  Living Situation:  Lives in [place].  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  Tobacco:  Denies.  Alcohol:  Social.  Drug use:  Denies.    VITAL SIGNS:  Vital Signs Last 24 Hrs  T(C): 37.5 (17 Nov 2017 14:00), Max: 37.9 (16 Nov 2017 20:36)  T(F): 99.5 (17 Nov 2017 14:00), Max: 100.3 (16 Nov 2017 20:36)  HR: 91 (17 Nov 2017 14:00) (71 - 98)  BP: 102/66 (17 Nov 2017 14:00) (83/50 - 102/66)  RR: 18 (17 Nov 2017 14:00) (17 - 18)  SpO2: 100% (17 Nov 2017 14:00) (98% - 100%)    PHYSICAL EXAMINATION:  General: Well-developed, well nourished, in no acute distress.  Eyes: Conjunctiva and sclera clear.  Cardiovascular: Regular rate and rhythm; S1 and S2 Normal; No murmurs, gallops or rubs.  Neurologic:  - Mental Status:  Alert, awake, oriented to person, place, and time; Speech is fluent with intact naming, repetition, and comprehension; Immediate recall is 3/3 words and delayed recall is 3/3 words at 5 minutes; Able to spell WORLD backwards and perform serial 7 subtraction; Able to read and write a sentence; Able to copy a cube; Good overall fund of knowledge.  - Cranial Nerves II-XII:    II:  Visual acuity is 20/20 bilaterally; Visual fields are full to confrontation; Fundoscopic exam is normal with sharp discs; Pupils are equal, round, and reactive to light.  III, IV, VI:  Extraocular movements are intact without nystagmus.  V:  Facial sensation is intact in the V1-V3 distribution bilaterally.  VII:  Face is symmetric with normal eye closure and smile  VIII:  Hearing is intact to finger rub.  IX, X:  Uvula is midline and soft palate rises symmetrically  XI:  Head turning and shoulder shrug are intact.  XII:  Tongue protrudes in the midline.  - Motor:  Strength is 5/5 throughout.  There is no pronator drift.  Normal muscle bulk and tone throughout.  - Reflexes:  2+ and symmetric at the biceps, triceps, brachioradialis, knees, and ankles.  Plantar responses flexor.  - Sensory:  Intact to light touch, pin prick, vibration, and joint-position sense throughout.  - Coordination:  Finger-nose-finger and heel-knee-shin intact without dysmetria.  Rapid alternating hand movements intact.  - Gait:   Normal steps, base, arm swing, and turning.  Heel and toe walking are normal.  Tandem gait is normal.  Romberg testing is negative.    LABS:                        11.4   10.5  )-----------( 248      ( 17 Nov 2017 07:53 )             34.7     11-17    138  |  101  |  9   ----------------------------<  104<H>  3.9   |  24  |  0.62    Ca    8.8      17 Nov 2017 07:53  Mg     2.1     11-17    TPro  6.1  /  Alb  3.4  /  TBili  <0.2  /  DBili  x   /  AST  24  /  ALT  43  /  AlkPhos  47  11-16    Urinalysis Basic - ( 15 Nov 2017 16:30 )  Color: Yellow / Appearance: Clear / SG: <=1.005 / pH: x  Gluc: x / Ketone: NEGATIVE  / Bili: Negative / Urobili: 0.2 E.U./dL   Blood: x / Protein: NEGATIVE mg/dL / Nitrite: NEGATIVE   Leuk Esterase: NEGATIVE / RBC: x / WBC x   Sq Epi: x / Non Sq Epi: x / Bacteria: x    RADIOLOGY & ADDITIONAL STUDIES:      11/13/2017 MRI Brain without contrast:  Normal.    11/16/2017 MRI Brain with and without contrast:  Unremarkable.    IMPRESSION & PLAN: NEUROLOGY INITIAL CONSULT NOTE    CHIEF COMPLAINT:  meninigitis     HPI:  Ms. Barragan is a 32-year-old woman with a past medical history of lumbar disc herniation (s/p epidural injection in 8/2017) who was at [facility] on 11/13 for severe HA partially relieved by lying down, neck pain, fever, weakness, myalgias, back pain radiating to the bilateral thighs.  Pt reports first experiencing similar symptoms 10 days ago (went to [facility] in [place] - negative work-up including:  LP, MRI Lumbar w/contrast, CT Head, West Nile virus - discharged w/supportive tx + Fioricet).  Pt then went to Dr Carty and was sent to ED.  At [facility], MRI brain was negative, neg RVP, neg BCx, neg HIV, evaluated by ID and Neuro - discharged home and felt better until 11/14 in pm - worsening of headache, photophobia and neck stiffness.  She denied any other symptoms.  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      Denied: recent travel, IV drug use, unprotected intercourse, any rashes or bites, invasive procedures other than epidural injection in 08/2017.  In the ER, pt s/p IV hydromorphone and IVF, w/partial relief of headache; neck stiffness, photophobia, chills. ROS otherwise negative. Requesting food.  VS in ED: T(F): 101.5  HR: 108  BP: 89/57 RR: 16 SpO2: 98% (15 Nov 2017 19:50)    PAST MEDICAL & SURGICAL HISTORY:  Lumbar disc herniation s/p ADOLFO in 8/2017  No significant past surgical history    REVIEW OF SYSTEMS:  As per HPI, otherwise negative for Constitutional, Eyes, Ears/Nose/Mouth/Throat, Neck, Cardiovascular, Respiratory, Gastrointestinal, Genitourinary, Skin, Endocrine, Musculoskeletal, Psychiatric, and Hematologic/Lymphatic.    MEDICATIONS  (STANDING):  cefTRIAXone   IVPB 2 Gram(s) IV Intermittent every 12 hours  influenza   Vaccine 0.5 milliLiter(s) IntraMuscular once  Nesacaine (Chloroprocaine Hcl) 2% Inj. 10 milliLiter(s) 10 milliLiter(s) Local Injection once  sodium chloride 0.9% Bolus 1000 milliLiter(s) IV Bolus once  sodium chloride 0.9%. 1000 milliLiter(s) (125 mL/Hr) IV Continuous <Continuous>  vancomycin  IVPB 1000 milliGRAM(s) IV Intermittent every 8 hours    MEDICATIONS  (PRN):  acetaminophen   Tablet 650 milliGRAM(s) Oral every 6 hours PRN For Temp greater than 38 C (100.4 F)  acetaminophen   Tablet. 650 milliGRAM(s) Oral every 6 hours PRN Mild Pain (1 - 3)  ondansetron Injectable 4 milliGRAM(s) IV Push every 6 hours PRN Nausea  oxyCODONE    IR 10 milliGRAM(s) Oral every 4 hours PRN Severe Pain (7 - 10)  oxyCODONE    IR 5 milliGRAM(s) Oral every 4 hours PRN Moderate Pain (4 - 6)    ALLERGIES:   Aspirin (Hives; Swelling)  Ibuprofen (Rash; Flushing; Hives)  Lidocaine (Short breath)    FAMILY HISTORY:  No pertinent family history in first degree relatives.    SOCIAL HISTORY:  Living Situation:  Lives in [place].  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  Tobacco:  Denies.  Alcohol:  Social.  Drug use:  Denies.    VITAL SIGNS:  Vital Signs Last 24 Hrs  T(C): 37.5 (17 Nov 2017 14:00), Max: 37.9 (16 Nov 2017 20:36)  T(F): 99.5 (17 Nov 2017 14:00), Max: 100.3 (16 Nov 2017 20:36)  HR: 91 (17 Nov 2017 14:00) (71 - 98)  BP: 102/66 (17 Nov 2017 14:00) (83/50 - 102/66)  RR: 18 (17 Nov 2017 14:00) (17 - 18)  SpO2: 100% (17 Nov 2017 14:00) (98% - 100%)    PHYSICAL EXAMINATION:  Unable to examine as patient not in her room.    LABS:                        11.4   10.5  )-----------( 248      ( 17 Nov 2017 07:53 )             34.7     11-17    138  |  101  |  9   ----------------------------<  104<H>  3.9   |  24  |  0.62    Ca    8.8      17 Nov 2017 07:53  Mg     2.1     11-17    TPro  6.1  /  Alb  3.4  /  TBili  <0.2  /  DBili  x   /  AST  24  /  ALT  43  /  AlkPhos  47  11-16    Urinalysis Basic - ( 15 Nov 2017 16:30 )  Color: Yellow / Appearance: Clear / SG: <=1.005 / pH: x  Gluc: x / Ketone: NEGATIVE  / Bili: Negative / Urobili: 0.2 E.U./dL   Blood: x / Protein: NEGATIVE mg/dL / Nitrite: NEGATIVE   Leuk Esterase: NEGATIVE / RBC: x / WBC x   Sq Epi: x / Non Sq Epi: x / Bacteria: x    11/13/2017  ESR 16 (H) [<= 15 mm/hr]  CRP 1.20 (H) [0.00 - 0.40 mg/dL]    11/14/2017   HIV 1/2 Nonreactive    11/15/2017  CSF WBC 1615 - 1742 (H) [0 - 5/uL] [N: 86%; L: 10%; M: 4%]  CSF RBC 2- 128 (H) [0 - 0/uL]  CSF Protein 111 (H) [15 - 45 mg/dL]  CSF Glucose 55 [40 - 70 mg/dL]  CSF  U/L  CSF Culture No growth to date.  CSF Gram stain: Many white blood cells. No organisms seen.  CSF PCR Not Detected (Escherichia coli; Haemophilus influenzae; Listeria monocytogenes; Neisseria meningitidis; Streptococcus agalactiae; Streptococcus pneumoniae; CMV; Enterovirus; HSV-1; HSV-2; Human herpesvirus 6; Parechovirus; VZV and Cryptococcus)    11/17/2017  CSF Protein 42 [15 - 45 mg/dL]  CSF Glucose 63 [40 - 70 mg/dL]    RADIOLOGY & ADDITIONAL STUDIES:      11/13/2017 MRI Brain without contrast:  Normal.    11/16/2017 MRI Brain with and without contrast:  Unremarkable.    11/16/2017 MRI Lumbar Spine with and without contrast:  1.  Mild meningeal enhancement seen. Mild diffuse enhancement of the cauda equina nerve roots, that may due to infection or relate to recent instrumentation.  2.  Mild diffuse enlargement of the nerve roots.  3.  Diffuse disc bulge at L4-L5 with a posterior annular tear.  4.  L5-S1 mild diffuse disc bulge and small shallow right paracentral disc protrusion contacting the right descending S1 nerve root.    IMPRESSION & PLAN:    Ms. Barragan is a 32-year-old woman with a PMHx of lumbar disc herniation s/p epidural steroid injection in 8/2017 who presents with severe head/neck pain, fever, weakness, myalgias, and back pain radiating to the bilateral thighs.  CSF analysis on 11/15 is consistent with acute bacterial meningitis given neutrophilic pleocytosis and elevated protein, most likely due to prior ADOLOF.  Causative organism is most likely Staphylococcus aureus or Streptococcus species (skin nereyda), less likely Pseudomonas aeruginosa and Enterococcus faecalis.  There are rare cases of epidural needles contaminated by fungus, ie. Aspergillus.  Very low clinical suspicion for tick-borne illness.      1) Agree with MRI of the cervical and thoracic spine with and without contrast to evaluate for parameningeal sources, such as an epidural abscess or subdural empyema.  2) Continue Ceftriaxone 2 grams IV q12h and Vancomycin 1 g IV q8h for 10 - 14 days.  3) Repeat LP today - check CSF opening pressure, cell count, protein, glucose, bacterial culture & gram stain, fungal culture, AFB culture, and "PCR panel".  Send CSF Cryptococcal Antigen and HSV 1/2 PCR.  4) Discontinue droplet precautions  5) ID consult Dr. Cheng

## 2017-11-17 NOTE — PROGRESS NOTE ADULT - PROBLEM SELECTOR PLAN 4
F: NS 125cc/hr  E: Replete PRN  N: Reg diet  PPX: No VTE ppx indicated, IMPROVE score 0; Droplet precautions    Full Code  DISPO: [facility]

## 2017-11-17 NOTE — PROGRESS NOTE ADULT - ASSESSMENT
32F w/PMH lumbar disk herniation s/p epidural injection 8/17 and recent neg w/u for similar presentation at [facility] (11/6) and [facility] (11/14) presenting with worsening headache, photophobia, neck stiffness, myalgias, fever, weakness, bl shooting back pain, now with PMN predominance on LP but no identified organisms on GS and negative PCR, concerning for infectious meningitis.

## 2017-11-17 NOTE — PROGRESS NOTE ADULT - SUBJECTIVE AND OBJECTIVE BOX
OVERNIGHT EVENTS: Pt w/low grade fever 100.3 and worsening HA, pt given 1g IV Tylenol.     SUBJECTIVE / INTERVAL HPI: Patient seen and examined at bedside. Pt complaining of continued HA, occular pressure, neck stiffnes, lumbar pain and photophobia. Pt does not think oxycodone is working, and pain is largely uncontrolled. Pt denied f/c/n/v/d, CP, palpitations, SOB, cough, URI symptoms, abdominal pain, urinary symptoms, change in bowel habits.     VITAL SIGNS:  Vital Signs Last 24 Hrs  T(C): 37.2 (17 Nov 2017 09:00), Max: 37.9 (16 Nov 2017 20:36)  T(F): 99 (17 Nov 2017 09:00), Max: 100.3 (16 Nov 2017 20:36)  HR: 71 (17 Nov 2017 09:00) (71 - 98)  BP: 89/57 (17 Nov 2017 09:00) (83/50 - 111/62)  RR: 18 (17 Nov 2017 09:00) (17 - 18)  SpO2: 98% (17 Nov 2017 09:00) (96% - 99%)    PHYSICAL EXAM:  General: NAD, laying in bed, face covered by mask w/lights off  HEENT: NC/AT; PERRL, EMOI, anicteric sclera; MMM  Neck: nuchal rigidity, Kernig and Brudzinski positive  Cardiovascular: +S1/S2; RR; no m/r/g  Respiratory: CTAB; no W/R/R  Gastrointestinal: soft, NT/ND; +BSx4  Extremities: WWP; no edema, clubbing or cyanosis; no tenderness to palpation  Vascular: 2+ distal pulses B/L  Neurological: AAOx3; 5/5 strength of UE and LE b/l; sensation intact and symmetrical throughout; + straight leg test b/l    MEDICATIONS:  MEDICATIONS  (STANDING):  cefTRIAXone   IVPB 2 Gram(s) IV Intermittent every 12 hours  influenza   Vaccine 0.5 milliLiter(s) IntraMuscular once  sodium chloride 0.9%. 1000 milliLiter(s) (125 mL/Hr) IV Continuous <Continuous>  vancomycin  IVPB 1000 milliGRAM(s) IV Intermittent every 8 hours    MEDICATIONS  (PRN):  acetaminophen   Tablet 650 milliGRAM(s) Oral every 6 hours PRN For Temp greater than 38 C (100.4 F)  acetaminophen   Tablet. 650 milliGRAM(s) Oral every 6 hours PRN Mild Pain (1 - 3)  ondansetron Injectable 4 milliGRAM(s) IV Push every 6 hours PRN Nausea  oxyCODONE    IR 10 milliGRAM(s) Oral every 4 hours PRN Severe Pain (7 - 10)  oxyCODONE    IR 5 milliGRAM(s) Oral every 4 hours PRN Moderate Pain (4 - 6)      ALLERGIES:  Allergies    aspirin (Hives; Swelling)  ibuprofen (Rash; Flushing; Hives)  lidocaine (Short breath)    Intolerances        LABS:                        11.4   10.5  )-----------( 248      ( 17 Nov 2017 07:53 )             34.7     11-17    138  |  101  |  9   ----------------------------<  104<H>  3.9   |  24  |  0.62    Ca    8.8      17 Nov 2017 07:53  Mg     2.1     11-17    TPro  6.1  /  Alb  3.4  /  TBili  <0.2  /  DBili  x   /  AST  24  /  ALT  43  /  AlkPhos  47  11-16    PT/INR - ( 15 Nov 2017 15:11 )   PT: 11.9 sec;   INR: 1.07          PTT - ( 15 Nov 2017 15:11 )  PTT:41.0 sec  Urinalysis Basic - ( 15 Nov 2017 16:30 )    Color: Yellow / Appearance: Clear / SG: <=1.005 / pH: x  Gluc: x / Ketone: NEGATIVE  / Bili: Negative / Urobili: 0.2 E.U./dL   Blood: x / Protein: NEGATIVE mg/dL / Nitrite: NEGATIVE   Leuk Esterase: NEGATIVE / RBC: x / WBC x   Sq Epi: x / Non Sq Epi: x / Bacteria: x      CAPILLARY BLOOD GLUCOSE          RADIOLOGY & ADDITIONAL TESTS: Reviewed.

## 2017-11-18 LAB
CRP SERPL-MCNC: 0.1 MG/DL — SIGNIFICANT CHANGE UP (ref 0–0.4)
CULTURE RESULTS: SIGNIFICANT CHANGE UP
CULTURE RESULTS: SIGNIFICANT CHANGE UP
ERYTHROCYTE [SEDIMENTATION RATE] IN BLOOD: 32 MM/HR — HIGH
LEGIONELLA AG UR QL: NEGATIVE — SIGNIFICANT CHANGE UP
NIGHT BLUE STAIN TISS: SIGNIFICANT CHANGE UP
SPECIMEN SOURCE: SIGNIFICANT CHANGE UP

## 2017-11-18 RX ORDER — HYDROMORPHONE HYDROCHLORIDE 2 MG/ML
1 INJECTION INTRAMUSCULAR; INTRAVENOUS; SUBCUTANEOUS ONCE
Qty: 0 | Refills: 0 | Status: DISCONTINUED | OUTPATIENT
Start: 2017-11-18 | End: 2017-11-18

## 2017-11-18 RX ORDER — HYDROMORPHONE HYDROCHLORIDE 2 MG/ML
1 INJECTION INTRAMUSCULAR; INTRAVENOUS; SUBCUTANEOUS EVERY 4 HOURS
Qty: 0 | Refills: 0 | Status: DISCONTINUED | OUTPATIENT
Start: 2017-11-18 | End: 2017-11-21

## 2017-11-18 RX ORDER — OXYCODONE HYDROCHLORIDE 5 MG/1
10 TABLET ORAL EVERY 12 HOURS
Qty: 0 | Refills: 0 | Status: DISCONTINUED | OUTPATIENT
Start: 2017-11-18 | End: 2017-11-21

## 2017-11-18 RX ORDER — SODIUM CHLORIDE 9 MG/ML
500 INJECTION INTRAMUSCULAR; INTRAVENOUS; SUBCUTANEOUS ONCE
Qty: 0 | Refills: 0 | Status: COMPLETED | OUTPATIENT
Start: 2017-11-18 | End: 2017-11-18

## 2017-11-18 RX ADMIN — HYDROMORPHONE HYDROCHLORIDE 1 MILLIGRAM(S): 2 INJECTION INTRAMUSCULAR; INTRAVENOUS; SUBCUTANEOUS at 19:45

## 2017-11-18 RX ADMIN — Medication 250 MILLIGRAM(S): at 21:49

## 2017-11-18 RX ADMIN — Medication 325 MILLIGRAM(S): at 08:03

## 2017-11-18 RX ADMIN — SODIUM CHLORIDE 16.67 MILLILITER(S): 9 INJECTION INTRAMUSCULAR; INTRAVENOUS; SUBCUTANEOUS at 17:40

## 2017-11-18 RX ADMIN — Medication 650 MILLIGRAM(S): at 15:29

## 2017-11-18 RX ADMIN — HYDROMORPHONE HYDROCHLORIDE 1 MILLIGRAM(S): 2 INJECTION INTRAMUSCULAR; INTRAVENOUS; SUBCUTANEOUS at 16:00

## 2017-11-18 RX ADMIN — Medication 250 MILLIGRAM(S): at 06:12

## 2017-11-18 RX ADMIN — Medication 250 MILLIGRAM(S): at 13:01

## 2017-11-18 RX ADMIN — OXYCODONE HYDROCHLORIDE 10 MILLIGRAM(S): 5 TABLET ORAL at 19:05

## 2017-11-18 RX ADMIN — CEFTRIAXONE 100 GRAM(S): 500 INJECTION, POWDER, FOR SOLUTION INTRAMUSCULAR; INTRAVENOUS at 17:40

## 2017-11-18 RX ADMIN — CEFTRIAXONE 100 GRAM(S): 500 INJECTION, POWDER, FOR SOLUTION INTRAMUSCULAR; INTRAVENOUS at 06:12

## 2017-11-18 RX ADMIN — HYDROMORPHONE HYDROCHLORIDE 1 MILLIGRAM(S): 2 INJECTION INTRAMUSCULAR; INTRAVENOUS; SUBCUTANEOUS at 20:00

## 2017-11-18 RX ADMIN — HYDROMORPHONE HYDROCHLORIDE 0.5 MILLIGRAM(S): 2 INJECTION INTRAMUSCULAR; INTRAVENOUS; SUBCUTANEOUS at 07:41

## 2017-11-18 RX ADMIN — HYDROMORPHONE HYDROCHLORIDE 1 MILLIGRAM(S): 2 INJECTION INTRAMUSCULAR; INTRAVENOUS; SUBCUTANEOUS at 15:45

## 2017-11-18 RX ADMIN — HYDROMORPHONE HYDROCHLORIDE 1 MILLIGRAM(S): 2 INJECTION INTRAMUSCULAR; INTRAVENOUS; SUBCUTANEOUS at 23:45

## 2017-11-18 RX ADMIN — HYDROMORPHONE HYDROCHLORIDE 1 MILLIGRAM(S): 2 INJECTION INTRAMUSCULAR; INTRAVENOUS; SUBCUTANEOUS at 10:50

## 2017-11-18 RX ADMIN — HYDROMORPHONE HYDROCHLORIDE 1 MILLIGRAM(S): 2 INJECTION INTRAMUSCULAR; INTRAVENOUS; SUBCUTANEOUS at 10:26

## 2017-11-18 RX ADMIN — HYDROMORPHONE HYDROCHLORIDE 0.5 MILLIGRAM(S): 2 INJECTION INTRAMUSCULAR; INTRAVENOUS; SUBCUTANEOUS at 12:57

## 2017-11-18 RX ADMIN — OXYCODONE HYDROCHLORIDE 10 MILLIGRAM(S): 5 TABLET ORAL at 17:40

## 2017-11-18 RX ADMIN — HYDROMORPHONE HYDROCHLORIDE 0.5 MILLIGRAM(S): 2 INJECTION INTRAMUSCULAR; INTRAVENOUS; SUBCUTANEOUS at 07:24

## 2017-11-18 RX ADMIN — HYDROMORPHONE HYDROCHLORIDE 0.5 MILLIGRAM(S): 2 INJECTION INTRAMUSCULAR; INTRAVENOUS; SUBCUTANEOUS at 13:15

## 2017-11-18 NOTE — PROGRESS NOTE ADULT - SUBJECTIVE AND OBJECTIVE BOX
32[place] with PMH of lumbar disc herniation (s/p epidural injection on 8/17) who was at [facility] on 11/13 for severe HA partially relieved by lying down, neck pain, fever, weakness, myalgias, back pain radiating to bl thighs.     Pt reports first experiencing similar symptoms 10 days ago (went to [facility] in [place] - neg w/u: LP, lumbar MRI w/contr, head CT, West Nile virus - discharged w/supportive tx + fiorocet).       Pt then went to Dr Carty and was sent to ED.  At [facility], neg MRI brain, neg RVP, neg BCx, neg HIV, evaluated by ID and Neuro - discharged home and felt better until 11/14 in pm - worsening of headache, photophobia and neck stiffness. She denied any other symptoms.  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   Denied: recent travel, IV drug use, unprotected intercourse, any rashes or bites, invasive procedures other than epidural injection in 08/2017.  In the ER, pt s/p IV hydromorphone and IVF, w/partial relief of headache; neck stiffness, photophobia, chills. ROS otherwise negative. Requesting food.  VS in ED: T(F): 101.5  HR: 108  BP: 89/57 RR: 16 SpO2: 98%     Patient seen and examined at bedside. Feeling better      	  MEDICATIONS:    cefTRIAXone   IVPB 2 Gram(s) IV Intermittent every 12 hours  vancomycin  IVPB 1000 milliGRAM(s) IV Intermittent every 8 hours      acetaminophen   Tablet 650 milliGRAM(s) Oral every 6 hours PRN  acetaminophen   Tablet. 650 milliGRAM(s) Oral every 6 hours PRN  HYDROmorphone  Injectable 1 milliGRAM(s) IV Push every 4 hours PRN  ondansetron Injectable 4 milliGRAM(s) IV Push every 6 hours PRN  oxyCODONE  ER Tablet 10 milliGRAM(s) Oral every 12 hours        influenza   Vaccine 0.5 milliLiter(s) IntraMuscular once      Complaint:     Otherwise 12 point review of systems is normal.    PHYSICAL EXAM:    Constitutional:NAD  Eyes: PERRL, EOMI, sclera non-icteric  Neck: supple, no masses, no JVD  Respiratory: CTA b/l, good air entry b/l, no wheezing, rhonchi, rales, with normal respiratory effort and no intercostal retractions  Cardiovascular: RRR, normal S1S2, no M/R/G  Gastrointestinal: soft, NTND, no masses palpable, BS normal in all four quadrants,   Extremities:  no c/c/e  Neurological: AAOx3      Vital Signs Last 24 Hrs  T(C): 38.4 (11-18-17 @ 18:55), Max: 39.4 (11-18-17 @ 15:31)  T(F): 101.2 (11-18-17 @ 18:55), Max: 102.9 (11-18-17 @ 15:31)  HR: 122 (11-18-17 @ 18:55) (97 - 122)  BP: 98/63 (11-18-17 @ 18:55) (87/57 - 100/64)  BP(mean): --  RR: 20 (11-18-17 @ 18:55) (17 - 20)  SpO2: 95% (11-18-17 @ 18:55) (95% - 100%)      ECG:      CHEST X RAY    CT    MRI    MRA    CT ANGIO    CAROTID DUPLEX    DUPLEX     Echocardiogram    Catheterization:    Stress Test:     LABS:	 	  CARDIAC MARKERS:                              11.4   10.5  )-----------( 248      ( 17 Nov 2017 07:53 )             34.7     11-17    138  |  101  |  9   ----------------------------<  104<H>  3.9   |  24  |  0.62    Ca    8.8      17 Nov 2017 07:53  Mg     2.1     11-17          ASSESSMENT/PLAN: 	    As per ID & Neuro

## 2017-11-18 NOTE — PROGRESS NOTE ADULT - SUBJECTIVE AND OBJECTIVE BOX
Neurology Follow up note    Name  JIM WILKINS    HPI:  32[place] with PMH of lumbar disc herniation (s/p epidural injection on 8/17) who was at [facility] on 11/13 for severe HA partially relieved by lying down, neck pain, fever, weakness, myalgias, back pain radiating to bl thighs.     Pt reports first experiencing similar symptoms 10 days ago (went to [facility] [place] in [place] - neg w/u: LP, lumbar MRI w/contr, head CT, West Nile virus - discharged w/supportive tx + fiorocet).     Pt then went to Dr Carty and was sent to ED.  At [facility], neg MRI brain, neg RVP, neg BCx, neg HIV, evaluated by ID and Neuro - discharged home and felt better until 11/14 in pm - worsening of headache, photophobia and neck stiffness. She denied any other symptoms.  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   Denied: recent travel, IV drug use, unprotected intercourse, any rashes or bites, invasive procedures other than epidural injection in 08/2017.  In the ER, pt s/p IV hydromorphone and IVF, w/partial relief of headache; neck stiffness, photophobia, chills. ROS otherwise negative. Requesting food.  VS in ED: T(F): 101.5  HR: 108  BP: 89/57 RR: 16 SpO2: 98% (15 Nov 2017 19:50)      Interval History - headache continues- no diplopia        REVIEW OF SYSTEMS    Vital Signs Last 24 Hrs  T(C): 38.5 (18 Nov 2017 07:12), Max: 38.5 (18 Nov 2017 07:12)  T(F): 101.3 (18 Nov 2017 07:12), Max: 101.3 (18 Nov 2017 07:12)  HR: 97 (18 Nov 2017 07:12) (91 - 105)  BP: 87/57 (18 Nov 2017 07:12) (87/57 - 102/66)  BP(mean): --  RR: 18 (18 Nov 2017 07:12) (17 - 18)  SpO2: 97% (18 Nov 2017 07:12) (97% - 100%)    Physical Exam-     Mental Status- pain- no aphasia    Cranial Nerves- full EOM    Gait and station-n/a     Motor- moves all 4 extremities    Reflexes-nl    Sensation-nl    Coordination-nl    Vascular -nl    Medications  acetaminophen   Tablet 650 milliGRAM(s) Oral every 6 hours PRN  acetaminophen   Tablet. 650 milliGRAM(s) Oral every 6 hours PRN  cefTRIAXone   IVPB 2 Gram(s) IV Intermittent every 12 hours  HYDROmorphone  Injectable 0.5 milliGRAM(s) IV Push every 3 hours PRN  influenza   Vaccine 0.5 milliLiter(s) IntraMuscular once  Nesacaine (Chloroprocaine Hcl) 2% Inj. 10 milliLiter(s) 10 milliLiter(s) Local Injection once  ondansetron Injectable 4 milliGRAM(s) IV Push every 6 hours PRN  vancomycin  IVPB 1000 milliGRAM(s) IV Intermittent every 8 hours      Lab      Radiology    Assessment- Meningitis    Plan antibiotics and pain meds

## 2017-11-18 NOTE — PROGRESS NOTE ADULT - SUBJECTIVE AND OBJECTIVE BOX
INTERVAL HPI/OVERNIGHT EVENTS:  Subjectively with much worse headache but if sufficient pain medication, she feels "much better"  Eating and drinking well, no diarrhea   No new symptoms    Case discussed with Dr Cheng last night     MEDICATIONS  (STANDING):  cefTRIAXone   IVPB 2 Gram(s) IV Intermittent every 12 hours  influenza   Vaccine 0.5 milliLiter(s) IntraMuscular once  Nesacaine (Chloroprocaine Hcl) 2% Inj. 10 milliLiter(s) 10 milliLiter(s) Local Injection once  oxyCODONE  ER Tablet 10 milliGRAM(s) Oral every 12 hours  vancomycin  IVPB 1000 milliGRAM(s) IV Intermittent every 8 hours    MEDICATIONS  (PRN):  acetaminophen   Tablet 650 milliGRAM(s) Oral every 6 hours PRN For Temp greater than 38 C (100.4 F)  acetaminophen   Tablet. 650 milliGRAM(s) Oral every 6 hours PRN Mild Pain (1 - 3)  HYDROmorphone  Injectable 1 milliGRAM(s) IV Push every 4 hours PRN Severe Pain (7 - 10)  ondansetron Injectable 4 milliGRAM(s) IV Push every 6 hours PRN Nausea      Allergies    aspirin (Hives; Swelling)  ibuprofen (Rash; Flushing; Hives)  lidocaine (Short breath)    EXAM  Vital Signs Last 24 Hrs  T(C): 39.4 (18 Nov 2017 15:31), Max: 39.4 (18 Nov 2017 15:31)  T(F): 102.9 (18 Nov 2017 15:31), Max: 102.9 (18 Nov 2017 15:31)  HR: 113 (18 Nov 2017 15:31) (97 - 113)  BP: 99/58 (18 Nov 2017 15:31) (87/57 - 100/64)  BP(mean): --  RR: 20 (18 Nov 2017 15:31) (17 - 20)  SpO2: 95% (18 Nov 2017 15:31) (95% - 100%)  On RA without distress  Awake and lucid but teary and oftentimes anxious  Not willing to move eyes or neck - states due to pain  No rash  RRR  Chest clear   Abd soft NT    LABS:                        11.4   10.5  )-----------( 248      ( 17 Nov 2017 07:53 )             34.7     11-17    138  |  101  |  9   ----------------------------<  104<H>  3.9   |  24  |  0.62    Ca    8.8      17 Nov 2017 07:53  Mg     2.1     11-17      Legionella pneumophila Antigen, Urine (11.17.17 @ 22:46)    Legionella Antigen, Urine: Negative    Cerebrospinal Fluid Cell Count-1 (11.17.17 @ 17:44)    Total Nucleated Cell Count, CSF: 74 /uL    RBC Count - Spinal Fluid: 38 /uL    CSF Color: No Color    Tube Type: Tube 3    CSF Appearance: Clear    CSF Comments: xantochromia    neg.    CSF Lymphocytes: 63 %    CSF Monocytes/Macrophages: 7 %    CSF Segmented Neutrophils: 4 %    Protein, CSF (11.17.17 @ 17:44)    Protein, CSF: 42 mg/dL    Glucose, CSF (11.17.17 @ 17:44)    Glucose, CSF: 63 mg/dL      MICROBIOLOGY:    Culture - Acid Fast - CSF (11.18.17 @ 07:49)    Specimen Source: .CSF CSF    Acid Fast Bacilli Smear:   Less than 5 cc of CSF received,  unable to perform AFB smear.    Culture - CSF with Gram Stain . (11.17.17 @ 18:22)    Gram Stain:   Moderate WBC's  No organisms seen    Specimen Source: .CSF CSF    Culture Results:   No growth to date    CSF PCR Panel (11.17.17 @ 17:44)    CSF PCR Result: NotDetec: The meningitis/encephalitis (ME) panel (CSFPCR) is a PCR based assay that  screens for: Escherichia coli; Haemophilus influenzae; Listeria  monocytogenes; Neisseria meningitidis; Streptococcus agalactiae;  Streptococcus pneumoniae; CMV; Enterovirus; HSV-1; HSV-2; Human  herpesvirus 6; Parechovirus; VZV and Cryptococcus. Result should be  interpreted in context of clinical presentation, imaging and other lab  tests. Positive predictive value may be lower in patients with normal CSF  chemistry and cell count.    Culture - CSF with Gram Stain . (11.15.17 @ 17:49)    Gram Stain:   Many White blood cells  No organisms seen    Specimen Source: .CSF CSF    Culture Results:   No growth to date    Culture - Blood (11.15.17 @ 17:04)    Specimen Source: .Blood Blood-Peripheral    Culture Results:   No growth at 3 days.    Culture - Blood (11.15.17 @ 17:04)    Specimen Source: .Blood Blood-Peripheral    Culture Results:   No growth at 3 days.      RADIOLOGY & ADDITIONAL STUDIES:    < from: NATALIE Procedure. (11.17.17 @ 17:13) >    EXAM:  IR INTERVENTIONAL RAD PROC                          PROCEDURE DATE:  11/17/2017         INTERPRETATION:  CLINICAL INDICATION: Altered mental status, meningitis    Exam: Fluoroscopic guided lumbar puncture    Prior to the procedure, the risks, benefits and alternatives to a   fluoroscopic-guided lumbar puncture were explained to the patient. The   patient agreed to the procedure and Informed consent was obtained.    PROCEDURE:    Using fluoroscopy, the L2-3 level was selected as most amenable to lumbar   puncture. Using sterile technique, a 20 gauge spinal needle was inserted   at the L2-3 level with prompt egress of CSF. Opening pressure was   measured at 16 cmH2O. A total of 18 cc of clear, colorless CSF was   obtained with an extra 5th tube for additional testing, and sent to the   laboratory for analysis. The patient was monitored after the procedure   and was discharged from the radiology department in stable condition.    IMPRESSION:     Successful fluoroscopic-guided diagnostic lumbar puncture.         Gallium scan in progress

## 2017-11-18 NOTE — PROGRESS NOTE ADULT - ASSESSMENT
Fever  Meningitis  Etiology still remains unknown but infectious cause suspected  Further work up in progress   Surprising improvement of CSF pleocytosis and normalization of protein and glucose with normal opening pressure.  Therefore, there is no compelling argument for introduction of any new empiric antimicrobial.    Hx of epidural steroid injection in August 2017 - potential association still considered.  Lyme testing in progress - blood serology.  Course overall not typical for Lyme but pt is inadvertently treated for CNS Lyme with Ceftriaxone  Increase in HA possibly due to LP - Neurology input and follow up would be appreciated     RECOMMEND  Continue IV Ceftriaxone and Vancomycin for now pending completion of Gallium scan and Lyme testing  If testing remains nonrevealing, I will inquire with [facility] whether we could solicit CDC's help - Please make sure extra CSF stored at the lab  If blood serology positive for Lyme, CSF should be tested for Borrelia/Lyme (the best test is antibody titer IgM/IgG rather than PCR).  But would await blood serology first.  Check serum cryptococcal Ag (cannot find the result) and serum galactomannan   Pain control

## 2017-11-19 LAB
ANION GAP SERPL CALC-SCNC: 12 MMOL/L — SIGNIFICANT CHANGE UP (ref 5–17)
APPEARANCE UR: CLEAR — SIGNIFICANT CHANGE UP
B BURGDOR C6 AB SER-ACNC: NEGATIVE — SIGNIFICANT CHANGE UP
B BURGDOR IGG+IGM SER-ACNC: 0.2 INDEX — SIGNIFICANT CHANGE UP (ref 0.01–0.89)
BILIRUB UR-MCNC: NEGATIVE — SIGNIFICANT CHANGE UP
BUN SERPL-MCNC: 9 MG/DL — SIGNIFICANT CHANGE UP (ref 7–23)
CALCIUM SERPL-MCNC: 8.5 MG/DL — SIGNIFICANT CHANGE UP (ref 8.4–10.5)
CHLORIDE SERPL-SCNC: 98 MMOL/L — SIGNIFICANT CHANGE UP (ref 96–108)
CO2 SERPL-SCNC: 25 MMOL/L — SIGNIFICANT CHANGE UP (ref 22–31)
COLOR SPEC: YELLOW — SIGNIFICANT CHANGE UP
CREAT SERPL-MCNC: 0.62 MG/DL — SIGNIFICANT CHANGE UP (ref 0.5–1.3)
DIFF PNL FLD: NEGATIVE — SIGNIFICANT CHANGE UP
GLUCOSE SERPL-MCNC: 98 MG/DL — SIGNIFICANT CHANGE UP (ref 70–99)
GLUCOSE UR QL: NEGATIVE — SIGNIFICANT CHANGE UP
HCT VFR BLD CALC: 36.3 % — SIGNIFICANT CHANGE UP (ref 34.5–45)
HGB BLD-MCNC: 11.9 G/DL — SIGNIFICANT CHANGE UP (ref 11.5–15.5)
KETONES UR-MCNC: NEGATIVE — SIGNIFICANT CHANGE UP
LACTATE CSF-MCNC: 1.7 MMOL/L — SIGNIFICANT CHANGE UP (ref 1.1–2.4)
LACTATE CSF-MCNC: 3.6 MMOL/L — HIGH (ref 1.1–2.4)
LEUKOCYTE ESTERASE UR-ACNC: NEGATIVE — SIGNIFICANT CHANGE UP
LYME C6 AB IGG/IGM EIA REFLEX WESTERN BL: SIGNIFICANT CHANGE UP
M PNEUMO IGM SER-ACNC: 424 UNITS/ML — SIGNIFICANT CHANGE UP
MCHC RBC-ENTMCNC: 29.3 PG — SIGNIFICANT CHANGE UP (ref 27–34)
MCHC RBC-ENTMCNC: 32.8 G/DL — SIGNIFICANT CHANGE UP (ref 32–36)
MCV RBC AUTO: 89.4 FL — SIGNIFICANT CHANGE UP (ref 80–100)
MYCOPLASMA AG SPEC QL: NEGATIVE — SIGNIFICANT CHANGE UP
NITRITE UR-MCNC: NEGATIVE — SIGNIFICANT CHANGE UP
PH UR: 6.5 — SIGNIFICANT CHANGE UP (ref 5–8)
PLATELET # BLD AUTO: 275 K/UL — SIGNIFICANT CHANGE UP (ref 150–400)
POTASSIUM SERPL-MCNC: 3.7 MMOL/L — SIGNIFICANT CHANGE UP (ref 3.5–5.3)
POTASSIUM SERPL-SCNC: 3.7 MMOL/L — SIGNIFICANT CHANGE UP (ref 3.5–5.3)
PROT UR-MCNC: NEGATIVE MG/DL — SIGNIFICANT CHANGE UP
RBC # BLD: 4.06 M/UL — SIGNIFICANT CHANGE UP (ref 3.8–5.2)
RBC # FLD: 12.5 % — SIGNIFICANT CHANGE UP (ref 10.3–16.9)
SODIUM SERPL-SCNC: 135 MMOL/L — SIGNIFICANT CHANGE UP (ref 135–145)
SP GR SPEC: <=1.005 — SIGNIFICANT CHANGE UP (ref 1–1.03)
UROBILINOGEN FLD QL: 0.2 E.U./DL — SIGNIFICANT CHANGE UP
VANCOMYCIN TROUGH SERPL-MCNC: 7.4 UG/ML — LOW (ref 10–20)
VANCOMYCIN TROUGH SERPL-MCNC: 9.2 UG/ML — LOW (ref 10–20)
VDRL CSF-TITR: NEGATIVE — SIGNIFICANT CHANGE UP
WBC # BLD: 10.7 K/UL — HIGH (ref 3.8–10.5)
WBC # FLD AUTO: 10.7 K/UL — HIGH (ref 3.8–10.5)

## 2017-11-19 PROCEDURE — 99233 SBSQ HOSP IP/OBS HIGH 50: CPT

## 2017-11-19 PROCEDURE — 70552 MRI BRAIN STEM W/DYE: CPT | Mod: 26

## 2017-11-19 RX ORDER — SENNA PLUS 8.6 MG/1
2 TABLET ORAL AT BEDTIME
Qty: 0 | Refills: 0 | Status: DISCONTINUED | OUTPATIENT
Start: 2017-11-19 | End: 2017-11-29

## 2017-11-19 RX ORDER — POTASSIUM CHLORIDE 20 MEQ
40 PACKET (EA) ORAL ONCE
Qty: 0 | Refills: 0 | Status: COMPLETED | OUTPATIENT
Start: 2017-11-19 | End: 2017-11-19

## 2017-11-19 RX ORDER — DEXAMETHASONE 0.5 MG/5ML
6 ELIXIR ORAL ONCE
Qty: 0 | Refills: 0 | Status: COMPLETED | OUTPATIENT
Start: 2017-11-19 | End: 2017-11-19

## 2017-11-19 RX ORDER — ONDANSETRON 8 MG/1
4 TABLET, FILM COATED ORAL ONCE
Qty: 0 | Refills: 0 | Status: COMPLETED | OUTPATIENT
Start: 2017-11-19 | End: 2017-11-19

## 2017-11-19 RX ORDER — DEXAMETHASONE 0.5 MG/5ML
10 ELIXIR ORAL ONCE
Qty: 0 | Refills: 0 | Status: COMPLETED | OUTPATIENT
Start: 2017-11-19 | End: 2017-11-19

## 2017-11-19 RX ORDER — PANTOPRAZOLE SODIUM 20 MG/1
40 TABLET, DELAYED RELEASE ORAL EVERY 24 HOURS
Qty: 0 | Refills: 0 | Status: DISCONTINUED | OUTPATIENT
Start: 2017-11-19 | End: 2017-11-20

## 2017-11-19 RX ORDER — VANCOMYCIN HCL 1 G
1250 VIAL (EA) INTRAVENOUS EVERY 12 HOURS
Qty: 0 | Refills: 0 | Status: DISCONTINUED | OUTPATIENT
Start: 2017-11-19 | End: 2017-11-19

## 2017-11-19 RX ORDER — POLYETHYLENE GLYCOL 3350 17 G/17G
17 POWDER, FOR SOLUTION ORAL EVERY 12 HOURS
Qty: 0 | Refills: 0 | Status: DISCONTINUED | OUTPATIENT
Start: 2017-11-19 | End: 2017-11-27

## 2017-11-19 RX ORDER — DEXAMETHASONE 0.5 MG/5ML
4 ELIXIR ORAL EVERY 6 HOURS
Qty: 0 | Refills: 0 | Status: DISCONTINUED | OUTPATIENT
Start: 2017-11-19 | End: 2017-11-19

## 2017-11-19 RX ORDER — VANCOMYCIN HCL 1 G
1500 VIAL (EA) INTRAVENOUS EVERY 8 HOURS
Qty: 0 | Refills: 0 | Status: DISCONTINUED | OUTPATIENT
Start: 2017-11-19 | End: 2017-11-19

## 2017-11-19 RX ORDER — IBUPROFEN 200 MG
400 TABLET ORAL ONCE
Qty: 0 | Refills: 0 | Status: DISCONTINUED | OUTPATIENT
Start: 2017-11-19 | End: 2017-11-19

## 2017-11-19 RX ORDER — DEXAMETHASONE 0.5 MG/5ML
6 ELIXIR ORAL EVERY 6 HOURS
Qty: 0 | Refills: 0 | Status: DISCONTINUED | OUTPATIENT
Start: 2017-11-20 | End: 2017-11-23

## 2017-11-19 RX ORDER — VANCOMYCIN HCL 1 G
1250 VIAL (EA) INTRAVENOUS EVERY 8 HOURS
Qty: 0 | Refills: 0 | Status: COMPLETED | OUTPATIENT
Start: 2017-11-19 | End: 2017-11-20

## 2017-11-19 RX ORDER — DIPHENHYDRAMINE HCL 50 MG
25 CAPSULE ORAL AT BEDTIME
Qty: 0 | Refills: 0 | Status: DISCONTINUED | OUTPATIENT
Start: 2017-11-19 | End: 2017-11-29

## 2017-11-19 RX ADMIN — HYDROMORPHONE HYDROCHLORIDE 1 MILLIGRAM(S): 2 INJECTION INTRAMUSCULAR; INTRAVENOUS; SUBCUTANEOUS at 04:30

## 2017-11-19 RX ADMIN — HYDROMORPHONE HYDROCHLORIDE 1 MILLIGRAM(S): 2 INJECTION INTRAMUSCULAR; INTRAVENOUS; SUBCUTANEOUS at 04:15

## 2017-11-19 RX ADMIN — Medication 6 MILLIGRAM(S): at 21:59

## 2017-11-19 RX ADMIN — HYDROMORPHONE HYDROCHLORIDE 1 MILLIGRAM(S): 2 INJECTION INTRAMUSCULAR; INTRAVENOUS; SUBCUTANEOUS at 13:03

## 2017-11-19 RX ADMIN — CEFTRIAXONE 100 GRAM(S): 500 INJECTION, POWDER, FOR SOLUTION INTRAMUSCULAR; INTRAVENOUS at 06:11

## 2017-11-19 RX ADMIN — HYDROMORPHONE HYDROCHLORIDE 1 MILLIGRAM(S): 2 INJECTION INTRAMUSCULAR; INTRAVENOUS; SUBCUTANEOUS at 18:58

## 2017-11-19 RX ADMIN — OXYCODONE HYDROCHLORIDE 10 MILLIGRAM(S): 5 TABLET ORAL at 07:00

## 2017-11-19 RX ADMIN — ONDANSETRON 4 MILLIGRAM(S): 8 TABLET, FILM COATED ORAL at 11:28

## 2017-11-19 RX ADMIN — HYDROMORPHONE HYDROCHLORIDE 1 MILLIGRAM(S): 2 INJECTION INTRAMUSCULAR; INTRAVENOUS; SUBCUTANEOUS at 09:31

## 2017-11-19 RX ADMIN — Medication 166.67 MILLIGRAM(S): at 18:59

## 2017-11-19 RX ADMIN — SENNA PLUS 2 TABLET(S): 8.6 TABLET ORAL at 21:59

## 2017-11-19 RX ADMIN — PANTOPRAZOLE SODIUM 40 MILLIGRAM(S): 20 TABLET, DELAYED RELEASE ORAL at 22:00

## 2017-11-19 RX ADMIN — Medication 102 MILLIGRAM(S): at 14:17

## 2017-11-19 RX ADMIN — OXYCODONE HYDROCHLORIDE 10 MILLIGRAM(S): 5 TABLET ORAL at 17:29

## 2017-11-19 RX ADMIN — HYDROMORPHONE HYDROCHLORIDE 1 MILLIGRAM(S): 2 INJECTION INTRAMUSCULAR; INTRAVENOUS; SUBCUTANEOUS at 13:20

## 2017-11-19 RX ADMIN — HYDROMORPHONE HYDROCHLORIDE 1 MILLIGRAM(S): 2 INJECTION INTRAMUSCULAR; INTRAVENOUS; SUBCUTANEOUS at 09:45

## 2017-11-19 RX ADMIN — CEFTRIAXONE 100 GRAM(S): 500 INJECTION, POWDER, FOR SOLUTION INTRAMUSCULAR; INTRAVENOUS at 17:34

## 2017-11-19 RX ADMIN — HYDROMORPHONE HYDROCHLORIDE 1 MILLIGRAM(S): 2 INJECTION INTRAMUSCULAR; INTRAVENOUS; SUBCUTANEOUS at 00:00

## 2017-11-19 RX ADMIN — Medication 333.33 MILLIGRAM(S): at 09:32

## 2017-11-19 RX ADMIN — HYDROMORPHONE HYDROCHLORIDE 1 MILLIGRAM(S): 2 INJECTION INTRAMUSCULAR; INTRAVENOUS; SUBCUTANEOUS at 19:13

## 2017-11-19 RX ADMIN — OXYCODONE HYDROCHLORIDE 10 MILLIGRAM(S): 5 TABLET ORAL at 18:29

## 2017-11-19 RX ADMIN — Medication 650 MILLIGRAM(S): at 17:01

## 2017-11-19 RX ADMIN — POLYETHYLENE GLYCOL 3350 17 GRAM(S): 17 POWDER, FOR SOLUTION ORAL at 17:31

## 2017-11-19 RX ADMIN — Medication 650 MILLIGRAM(S): at 10:00

## 2017-11-19 RX ADMIN — OXYCODONE HYDROCHLORIDE 10 MILLIGRAM(S): 5 TABLET ORAL at 06:11

## 2017-11-19 RX ADMIN — Medication 650 MILLIGRAM(S): at 03:16

## 2017-11-19 RX ADMIN — Medication 25 MILLIGRAM(S): at 22:43

## 2017-11-19 RX ADMIN — Medication 4 MILLIGRAM(S): at 20:00

## 2017-11-19 RX ADMIN — Medication 40 MILLIEQUIVALENT(S): at 17:04

## 2017-11-19 NOTE — PROGRESS NOTE ADULT - ASSESSMENT
32yoF with suspected meningitis although infectious/inflammatory etiology unclear.     - All cultures negative.  - Eye exam essentially normal except for severe photophobia and eye pain which are expected with a meningeal process. Intraocular pressure within normal limits.  - 32yoF with suspected meningitis although infectious/inflammatory etiology unclear.     - All cultures negative so far. MRI brain normal.  - Eye exam normal except for severe photophobia and eye pain which are expected with a meningeal process. Intraocular pressure within normal limits. No evidence of papilledema or disc edema.  - Plan as per ID/neurology including infectious and inflammatory (Decadron) coverage.  - Case also discussed with Dr. Zhao Bar (neuro-ophthalmology).

## 2017-11-19 NOTE — PROGRESS NOTE ADULT - SUBJECTIVE AND OBJECTIVE BOX
Neurology Follow up note    Name  JIM WILKINS    HPI:  32[place] with PMH of lumbar disc herniation (s/p epidural injection on 8/17) who was at [facility] on 11/13 for severe HA partially relieved by lying down, neck pain, fever, weakness, myalgias, back pain radiating to bl thighs.     Pt reports first experiencing similar symptoms 10 days ago (went to [facility] in [place] - neg w/u: LP, lumbar MRI w/contr, head CT, West Nile virus - discharged w/supportive tx + fiorocet).     Pt then went to Dr Carty and was sent to ED.  At [facility], neg MRI brain, neg RVP, neg BCx, neg HIV, evaluated by ID and Neuro - discharged home and felt better until 11/14 in pm - worsening of headache, photophobia and neck stiffness. She denied any other symptoms.  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   Denied: recent travel, IV drug use, unprotected intercourse, any rashes or bites, invasive procedures other than epidural injection in 08/2017.  In the ER, pt s/p IV hydromorphone and IVF, w/partial relief of headache; neck stiffness, photophobia, chills. ROS otherwise negative. Requesting food.  VS in ED: T(F): 101.5  HR: 108  BP: 89/57 RR: 16 SpO2: 98% (15 Nov 2017 19:50)      Interval History - headache and phophobia        REVIEW OF SYSTEMS    Vital Signs Last 24 Hrs  T(C): 39.4 (19 Nov 2017 16:59), Max: 39.4 (19 Nov 2017 03:15)  T(F): 102.9 (19 Nov 2017 16:59), Max: 103 (19 Nov 2017 03:15)  HR: 108 (19 Nov 2017 09:29) (76 - 122)  BP: 99/65 (19 Nov 2017 09:29) (97/60 - 99/65)  BP(mean): --  RR: 20 (19 Nov 2017 09:29) (18 - 20)  SpO2: 96% (19 Nov 2017 09:29) (94% - 97%)    Physical Exam-     Mental Status- no aphasia    Cranial Nerves- full EOM- pupils reactive    Gait and station-n/a    Motor- no focal weakness    Reflexes-intact    Sensation-n/a    Coordination- no tremors    Vascular -    Medications  acetaminophen   Tablet 650 milliGRAM(s) Oral every 6 hours PRN  acetaminophen   Tablet. 650 milliGRAM(s) Oral every 6 hours PRN  cefTRIAXone   IVPB 2 Gram(s) IV Intermittent every 12 hours  dexamethasone  Injectable 4 milliGRAM(s) IV Push every 6 hours  HYDROmorphone  Injectable 1 milliGRAM(s) IV Push every 4 hours PRN  influenza   Vaccine 0.5 milliLiter(s) IntraMuscular once  Nesacaine (Chloroprocaine Hcl) 2% Inj. 10 milliLiter(s) 10 milliLiter(s) Local Injection once  ondansetron Injectable 4 milliGRAM(s) IV Push every 6 hours PRN  oxyCODONE  ER Tablet 10 milliGRAM(s) Oral every 12 hours  polyethylene glycol 3350 17 Gram(s) Oral every 12 hours  senna 2 Tablet(s) Oral at bedtime  vancomycin  IVPB 1250 milliGRAM(s) IV Intermittent every 8 hours      Lab MRI head with Gado normal      Radiology    Assessment- Meningeal process - adding Decadron    Plan- spoke to Sonia Espino Raconelli

## 2017-11-19 NOTE — PROGRESS NOTE ADULT - SUBJECTIVE AND OBJECTIVE BOX
Patient also seen for Dr Torrey Lopez        INTERVAL HPI/OVERNIGHT EVENTS:  C/O worsening headache and eye pressure  Had nausea and emesis today and for the first time does not feel like eating anything.  She remains alert and lucid  No diarrhea or abdominal pain    MEDICATIONS  (STANDING):  cefTRIAXone   IVPB 2 Gram(s) IV Intermittent every 12 hours  dexamethasone  IVPB 10 milliGRAM(s) IV Intermittent once  influenza   Vaccine 0.5 milliLiter(s) IntraMuscular once  Nesacaine (Chloroprocaine Hcl) 2% Inj. 10 milliLiter(s) 10 milliLiter(s) Local Injection once  oxyCODONE  ER Tablet 10 milliGRAM(s) Oral every 12 hours  polyethylene glycol 3350 17 Gram(s) Oral every 12 hours  potassium chloride    Tablet ER 40 milliEquivalent(s) Oral once  senna 2 Tablet(s) Oral at bedtime  vancomycin  IVPB 1250 milliGRAM(s) IV Intermittent every 8 hours    MEDICATIONS  (PRN):  acetaminophen   Tablet 650 milliGRAM(s) Oral every 6 hours PRN For Temp greater than 38 C (100.4 F)  acetaminophen   Tablet. 650 milliGRAM(s) Oral every 6 hours PRN Mild Pain (1 - 3)  HYDROmorphone  Injectable 1 milliGRAM(s) IV Push every 4 hours PRN Severe Pain (7 - 10)  ondansetron Injectable 4 milliGRAM(s) IV Push every 6 hours PRN Nausea      Allergies    aspirin (Hives; Swelling)  ibuprofen (Rash; Flushing; Hives)  lidocaine (Short breath)    EXAM  Vital Signs Last 24 Hrs  T(C): 38.5 (19 Nov 2017 09:29), Max: 39.4 (18 Nov 2017 15:31)  T(F): 101.3 (19 Nov 2017 09:29), Max: 103 (19 Nov 2017 03:15)  HR: 108 (19 Nov 2017 09:29) (76 - 122)  BP: 99/65 (19 Nov 2017 09:29) (97/60 - 99/65)  BP(mean): --  RR: 20 (19 Nov 2017 09:29) (18 - 20)  SpO2: 96% (19 Nov 2017 09:29) (94% - 97%)  On RA  [place] normally and responds appropriately albeit very emotional  No rash  RRR  Abd soft NT  Observed to move her neck/head but does not want to open eyes      LABS:                        11.9   10.7  )-----------( 275      ( 19 Nov 2017 07:21 )             36.3     11-19    135  |  98  |  9   ----------------------------<  98  3.7   |  25  |  0.62    Ca    8.5      19 Nov 2017 07:21      Mycoplasma Pneumoniae IgM Antibody (11.16.17 @ 16:22)    Mycoplasma IgM AB: 424 units/mL    Mycoplasma: Negative: Method EIA           Interpretation:                                             Units/mL                  Negative              <=910                  Low Positive        [id number]                  Positive                >=1120      Borrelia burgdorferi IgG/IgM Antibodies (11.18.17 @ 22:59)    LYME IgG/IgM Antibodies Result: 0.20 Index    Lyme C6 Interpretation: Negative: METHOD: ALVARADO      Reference Range: (values expressed as Lyme Index )                                < 0.90        Negative                                0.90 - 1.09   Equivocal                                >= 1.10        Positive      CDC/ASTPHLDGuidelines recommend that all samples judged equivocal or      positive be re-tested by immunoblot for confirmation of results.    Lyme C6 AB IgG/IgM EIA reflex Western Bl (11.18.17 @ 22:59)  Lyme C6 AB IgG/IgM EIA reflex Western Bl: DONE    I called the lab as reporting not clear.  As per [facility] - Lyme screening test was negative and therefore no Western Blot done  C6 Abs are reported negative but unable to confirm with [facility] that it was performed.   Lab cecking into the matter      MICROBIOLOGY:  Culture - CSF with Gram Stain . (11.17.17 @ 18:22)    Gram Stain:   Moderate WBC's  No organisms seen    Specimen Source: .CSF CSF    Culture Results:   No growth to date      VDRL Titer, CSF (11.16.17 @ 23:50)    VDRL Titer, CSF: Negative: Test Performed by:  [facility]  [address] [place] [address]    Culture - CSF with Gram Stain . (11.15.17 @ 17:49)    Gram Stain:   Many White blood cells  No organisms seen    Specimen Source: .CSF CSF    Culture Results:   No growth to date        RADIOLOGY & ADDITIONAL STUDIES:    Gallium in progress

## 2017-11-19 NOTE — PROGRESS NOTE ADULT - PROBLEM SELECTOR PLAN 3
-Pt complaining of occular pressure and headache, LP s/f for normal opening pressure with normal MRI head  -likely from current infectious etiology vs LP  -consider ophthalmology if sxs doesn't improve

## 2017-11-19 NOTE — PROGRESS NOTE ADULT - SUBJECTIVE AND OBJECTIVE BOX
Patient reports worsening photophobia and headache. Feels like eye muscles hurt. Headache is severe and extends to the base of the skull. Vision is fine. No diplopia. Reports nausea and vomiting today. Still spiking fevers.    Va, sc, near - 20/20 OU  Pupils - PERRLA. No APD.  EOMs - Grossly full although somewhat limited laterally on both sides due to pain. No induced diplopia.  CVF - Grossly full but difficult because patient unable to tolerate light  Color - 12/12 OU    PLE  LLA - Flat OU  C/S - W&Q OU. No petechiae.  K - Clear OU  A/C - D&Q OU  Iris - Flat OU  Lens - Clear OU  IOP - 20 mmHg OD, 19 mmHg OS by Tonopen at 6:15 pm    DFE - Tropicamide 0.5%  C/D - No optic disc edema/papilledema OU.  Macula and peripheral retina flat OU. No evidence of retinal hemorrhages.      MRI brain - Normal study Patient reports worsening photophobia and headache. Feels like eye muscles hurt. Headache is severe and extends to the base of the skull. Vision is fine. No diplopia. Reports nausea and vomiting today. Still spiking fevers. Mom reports that her daughter seems better after getting Decadron.    Va, sc, near - 20/20 OU  Pupils - PERRLA. No APD.  EOMs - Grossly full although somewhat limited laterally on both sides due to pain. No induced diplopia.  CVF - Grossly full but difficult because patient unable to tolerate light  Color - 12/12 OU    PLE  LLA - Flat OU  C/S - W&Q OU. No petechiae.  K - Clear OU  A/C - D&Q OU  Iris - Flat OU  Lens - Clear OU  IOP - 20 mmHg OD, 19 mmHg OS by Tonopen at 6:15 pm    DFE - Tropicamide 0.5%  C/D - 0.4 OU. No optic disc edema/papilledema OU. Macula and peripheral retina flat OU. No evidence of retinal hemorrhages.      MRI brain - Normal study

## 2017-11-19 NOTE — PROGRESS NOTE ADULT - ASSESSMENT
Meningitis of unclear etiology but presumed infectious  No pathogen identified so far  Of concern is escalating HA with N/V - not sure whether due to increased ICP or pain meds  Needs neurological and ophthalmologic reassessment    I called Dr Carty and asked Intern to call ophthalmology for a follow up  Per Neuro: Head MRI now and both Dr. Angelo and Dr. Carty will see the patient today.   Also as per Neuro, pt is being started on steroids now.      Continue IV Vanco and Ceftriaxone  Would increase IV Vanco to 1250 mg q 8 hours    I asked the Lab to keep the left-over CSF for now.  I will reach out to [facility] after the weekend and try to solicit their assistance.

## 2017-11-19 NOTE — PROGRESS NOTE ADULT - ASSESSMENT
32F w/PMH lumbar disk herniation s/p epidural injection 8/17 and recent neg w/u for similar presentation at [facility] (11/6) and [facility] (11/14) presenting with worsening headache, photophobia, neck stiffness, myalgias, fever, weakness, bl shooting back pain, now with PMN predominance on LP but no identified organisms on GS and negative PCR, concerning for infectious meningitis.

## 2017-11-19 NOTE — PROGRESS NOTE ADULT - PROBLEM SELECTOR PLAN 4
F: no IVF  E: Replete PRN  N: Reg diet  PPX: No VTE ppx indicated, IMPROVE score 0; Droplet precautions    Full Code  DISPO: HUI

## 2017-11-19 NOTE — PROGRESS NOTE ADULT - SUBJECTIVE AND OBJECTIVE BOX
INTERVAL HPI/OVERNIGHT EVENTS: Spiked to 103 overnight. Received Tylenol.     SUBJECTIVE: Patient seen and examined at bedside. Continues to have 5/10 pain, reports pain has improved since starting the new pain regimen yesterday. Still unable to have free extraocular movements. Still unable to have neck movements freely. Last BM two days ago. Reports photophobia.      REVIEW OF SYSTEMS:  CONSTITUTIONAL: Reports fever. No weakness or chills  EYES/ENT: No visual changes;  No vertigo or throat pain   NECK: Stiffness present  RESPIRATORY: No cough, wheezing, hemoptysis; No shortness of breath  CARDIOVASCULAR: No chest pain or palpitations  GASTROINTESTINAL: LLQ discomfort and constipation present. No nausea, vomiting, or hematemesis; No diarrhea. No melena or hematochezia.  GENITOURINARY: No dysuria, frequency or hematuria  NEUROLOGICAL: No numbness or weakness  SKIN: No itching, rashes    OBJECTIVE:  VITAL SIGNS:  ICU Vital Signs Last 24 Hrs  T(C): 38.2 (19 Nov 2017 05:04), Max: 39.4 (18 Nov 2017 15:31)  T(F): 100.8 (19 Nov 2017 05:04), Max: 103 (19 Nov 2017 03:15)  HR: 100 (19 Nov 2017 05:04) (76 - 122)  BP: 98/57 (19 Nov 2017 05:04) (87/57 - 99/58)  BP(mean): --  ABP: --  ABP(mean): --  RR: 20 (19 Nov 2017 05:04) (18 - 20)  SpO2: 94% (19 Nov 2017 05:04) (94% - 97%)        CAPILLARY BLOOD GLUCOSE          PHYSICAL EXAM:    General: NAD  HEENT: NC/AT; PERRL, clear conjunctiva  Neck: stiff  Respiratory: CTA b/l  Cardiovascular: +S1/S2; RRR  Abdomen: soft, Tenderness to LLQ/ND; +BS x4  Extremities: WWP, 2+ peripheral pulses b/l; no LE edema  Skin: normal color and turgor; no rash  Neurological: AAOx3. resistant to have EOMI, photophobia present, v-xii intact    MEDICATIONS:  MEDICATIONS  (STANDING):  cefTRIAXone   IVPB 2 Gram(s) IV Intermittent every 12 hours  influenza   Vaccine 0.5 milliLiter(s) IntraMuscular once  Nesacaine (Chloroprocaine Hcl) 2% Inj. 10 milliLiter(s) 10 milliLiter(s) Local Injection once  oxyCODONE  ER Tablet 10 milliGRAM(s) Oral every 12 hours    MEDICATIONS  (PRN):  acetaminophen   Tablet 650 milliGRAM(s) Oral every 6 hours PRN For Temp greater than 38 C (100.4 F)  acetaminophen   Tablet. 650 milliGRAM(s) Oral every 6 hours PRN Mild Pain (1 - 3)  HYDROmorphone  Injectable 1 milliGRAM(s) IV Push every 4 hours PRN Severe Pain (7 - 10)  ondansetron Injectable 4 milliGRAM(s) IV Push every 6 hours PRN Nausea      ALLERGIES:  Allergies    aspirin (Hives; Swelling)  ibuprofen (Rash; Flushing; Hives)  lidocaine (Short breath)    Intolerances        LABS:                        11.4   10.5  )-----------( 248      ( 17 Nov 2017 07:53 )             34.7     11-17    138  |  101  |  9   ----------------------------<  104<H>  3.9   |  24  |  0.62    Ca    8.8      17 Nov 2017 07:53  Mg     2.1     11-17            RADIOLOGY & ADDITIONAL TESTS: Reviewed.    MR Thoracic Spine w/wo IV Cont (11.17.17 @ 18:40) >  IMPRESSION:   In contiguity with findings on recent lumbar spine MRI, there is abnormal   dural enhancement at the lower thoracic intradural extramedullary   compartment that is likely inflammatory/infectious. CSF analysis is   recommended.  No bony abnormality or fluid collection.   Small disc herniations at T9-10 and T10-11 as above.    MR Cervical Spine w/wo IV Cont (11.17.17)   IMPRESSION:   No sign of cervical spine infection. Specifically, there is no   intraspinal fluid collection or abnormal enhancement in this patient with   meningitis. The cervical spinal cord is normal.  Normal study aside from incidental left C4 lateral mass hemangioma.    MR Lumbar Spine w/wo IV Cont (11.16.17 @ 20:13) >  IMPRESSION:   1.  Mild meningeal enhancement seen. Mild diffuse enhancement of the   cauda equina nerve roots, that may due to infection or relate to recent   instrumentation .  2.  mild diffuse enlargement of the nerve roots.  3.  Diffuse disc bulge at L4-L5 with a posterior annular tear.   4.  L5-S1 mild diffuse disc bulge and small shallow right paracentral   disc protrusion contacting the right descending S1 nerve root .    MR Head w/wo IV Cont (11.16.17 @ 20:13)  IMPRESSION: Unremarkable pre and postcontrast MRI of the brain.

## 2017-11-19 NOTE — PROGRESS NOTE ADULT - PROBLEM SELECTOR PLAN 1
Pt with previously negative workup, now with PMN predominance on CSF and s/s of meningitis. Pt w/Tmax 103.5 and LP s/f neutrophilic predominance, suggestive of bacterial etiology; however, CSF PCR panel, CSF bacterial cultures and gram stain here and from [facility] are neg.  - c/w ceftriaxone 2g IV q12h, vancomycin 1g IV q8h  - f/u vanc trough before 11/19 6AM dose  - d/c'd acyclovir 600mg IV q8h per Dr. Miller  - Service ID consulted as pt's mother would like second opinion; appreciate recs  - Dr. Osborne following for pain management; recommends oxycodone IR 5mg q4hr PRN for moderate pain and 10mg q4hr PRN for severe pain; Dilaudid 1mg IVP for breakthrough pain; f/u for standing pain medication  -Repeat IR guided LP today; pt not clinically improving, and CSF samples for fungal cultures, AFB, cryptococcus, and mycoplasma needed  -f/u CSF and serum Lyme studies from [facility] still pending; [phone]   -f/u repeat procalcitonin  -BCx and CSF bacteria cultures NGTD  -Contact Liza Paz NP at [facility], concerning hospital course and indication for abx PPx; per ID: there is not enough information at this time to warrant recommendations for antibiotic PPx. Mobile [phone], Work [phone]

## 2017-11-19 NOTE — PROGRESS NOTE ADULT - PROBLEM SELECTOR PLAN 2
-s/p epidural injection  -no interventions at this time   -f/u lumbar MRI  -c/w oxycontin 10bid, dilaudid 1mg 14 and tylenol

## 2017-11-20 LAB
ALBUMIN CSF-MCNC: 23.4 MG/DL — SIGNIFICANT CHANGE UP (ref 14–25)
ALBUMIN SERPL ELPH-MCNC: 3690 MG/DL — SIGNIFICANT CHANGE UP (ref 3500–5200)
ANION GAP SERPL CALC-SCNC: 17 MMOL/L — SIGNIFICANT CHANGE UP (ref 5–17)
BASOPHILS NFR BLD AUTO: 0.1 % — SIGNIFICANT CHANGE UP (ref 0–2)
BUN SERPL-MCNC: 11 MG/DL — SIGNIFICANT CHANGE UP (ref 7–23)
CALCIUM SERPL-MCNC: 9.1 MG/DL — SIGNIFICANT CHANGE UP (ref 8.4–10.5)
CHLORIDE SERPL-SCNC: 97 MMOL/L — SIGNIFICANT CHANGE UP (ref 96–108)
CO2 SERPL-SCNC: 23 MMOL/L — SIGNIFICANT CHANGE UP (ref 22–31)
CREAT SERPL-MCNC: 0.56 MG/DL — SIGNIFICANT CHANGE UP (ref 0.5–1.3)
CULTURE RESULTS: SIGNIFICANT CHANGE UP
CULTURE RESULTS: SIGNIFICANT CHANGE UP
EOSINOPHIL NFR BLD AUTO: 0.2 % — SIGNIFICANT CHANGE UP (ref 0–6)
GLUCOSE BLDC GLUCOMTR-MCNC: 119 MG/DL — HIGH (ref 70–99)
GLUCOSE BLDC GLUCOMTR-MCNC: 133 MG/DL — HIGH (ref 70–99)
GLUCOSE BLDC GLUCOMTR-MCNC: 197 MG/DL — HIGH (ref 70–99)
GLUCOSE SERPL-MCNC: 175 MG/DL — HIGH (ref 70–99)
HCT VFR BLD CALC: 37.6 % — SIGNIFICANT CHANGE UP (ref 34.5–45)
HGB BLD-MCNC: 12.6 G/DL — SIGNIFICANT CHANGE UP (ref 11.5–15.5)
IGG CSF-MCNC: 2.5 MG/DL — SIGNIFICANT CHANGE UP
IGG FLD-MCNC: 924 MG/DL — SIGNIFICANT CHANGE UP (ref 694–1618)
IGG SYNTH RATE SER+CSF CALC-MRATE: -4 MG/DAY — SIGNIFICANT CHANGE UP
IGG/ALB CLEAR SER+CSF-RTO: 0.4 — SIGNIFICANT CHANGE UP
IGG/ALB CSF: 0.11 RATIO — SIGNIFICANT CHANGE UP
IGG/ALB SER: 0.25 RATIO — SIGNIFICANT CHANGE UP
LDH CSF L TO P-CCNC: 34 U/L — SIGNIFICANT CHANGE UP
LDH FLD-CCNC: 34 U/L — SIGNIFICANT CHANGE UP
LYMPHOCYTES # BLD AUTO: 12.9 % — LOW (ref 13–44)
MCHC RBC-ENTMCNC: 29.5 PG — SIGNIFICANT CHANGE UP (ref 27–34)
MCHC RBC-ENTMCNC: 33.5 G/DL — SIGNIFICANT CHANGE UP (ref 32–36)
MCV RBC AUTO: 88.1 FL — SIGNIFICANT CHANGE UP (ref 80–100)
MONOCYTES NFR BLD AUTO: 5.2 % — SIGNIFICANT CHANGE UP (ref 2–14)
NEUTROPHILS NFR BLD AUTO: 81.6 % — HIGH (ref 43–77)
PLATELET # BLD AUTO: 306 K/UL — SIGNIFICANT CHANGE UP (ref 150–400)
POTASSIUM SERPL-MCNC: 4 MMOL/L — SIGNIFICANT CHANGE UP (ref 3.5–5.3)
POTASSIUM SERPL-SCNC: 4 MMOL/L — SIGNIFICANT CHANGE UP (ref 3.5–5.3)
RBC # BLD: 4.27 M/UL — SIGNIFICANT CHANGE UP (ref 3.8–5.2)
RBC # FLD: 12.1 % — SIGNIFICANT CHANGE UP (ref 10.3–16.9)
SODIUM SERPL-SCNC: 137 MMOL/L — SIGNIFICANT CHANGE UP (ref 135–145)
SPECIMEN SOURCE: SIGNIFICANT CHANGE UP
SPECIMEN SOURCE: SIGNIFICANT CHANGE UP
VANCOMYCIN TROUGH SERPL-MCNC: 18.7 UG/ML — SIGNIFICANT CHANGE UP (ref 10–20)
VDRL CSF-TITR: NEGATIVE — SIGNIFICANT CHANGE UP
WBC # BLD: 11.8 K/UL — HIGH (ref 3.8–10.5)
WBC # FLD AUTO: 11.8 K/UL — HIGH (ref 3.8–10.5)
WNV AB SPEC QL: SIGNIFICANT CHANGE UP
WNV IGG TITR FLD: NEGATIVE — SIGNIFICANT CHANGE UP
WNV IGM SPEC QL: NEGATIVE — SIGNIFICANT CHANGE UP

## 2017-11-20 PROCEDURE — 78806: CPT | Mod: 26

## 2017-11-20 RX ORDER — PANTOPRAZOLE SODIUM 20 MG/1
40 TABLET, DELAYED RELEASE ORAL
Qty: 0 | Refills: 0 | Status: DISCONTINUED | OUTPATIENT
Start: 2017-11-21 | End: 2017-11-29

## 2017-11-20 RX ORDER — DEXTROSE 50 % IN WATER 50 %
25 SYRINGE (ML) INTRAVENOUS ONCE
Qty: 0 | Refills: 0 | Status: DISCONTINUED | OUTPATIENT
Start: 2017-11-20 | End: 2017-11-29

## 2017-11-20 RX ORDER — SODIUM CHLORIDE 9 MG/ML
1000 INJECTION INTRAMUSCULAR; INTRAVENOUS; SUBCUTANEOUS ONCE
Qty: 0 | Refills: 0 | Status: COMPLETED | OUTPATIENT
Start: 2017-11-20 | End: 2017-11-20

## 2017-11-20 RX ORDER — INSULIN LISPRO 100/ML
VIAL (ML) SUBCUTANEOUS
Qty: 0 | Refills: 0 | Status: DISCONTINUED | OUTPATIENT
Start: 2017-11-20 | End: 2017-11-29

## 2017-11-20 RX ORDER — DEXTROSE 50 % IN WATER 50 %
12.5 SYRINGE (ML) INTRAVENOUS ONCE
Qty: 0 | Refills: 0 | Status: DISCONTINUED | OUTPATIENT
Start: 2017-11-20 | End: 2017-11-29

## 2017-11-20 RX ORDER — DEXTROSE 50 % IN WATER 50 %
1 SYRINGE (ML) INTRAVENOUS ONCE
Qty: 0 | Refills: 0 | Status: DISCONTINUED | OUTPATIENT
Start: 2017-11-20 | End: 2017-11-29

## 2017-11-20 RX ORDER — ACETAMINOPHEN 500 MG
650 TABLET ORAL ONCE
Qty: 0 | Refills: 0 | Status: COMPLETED | OUTPATIENT
Start: 2017-11-20 | End: 2017-11-20

## 2017-11-20 RX ORDER — INSULIN LISPRO 100/ML
VIAL (ML) SUBCUTANEOUS
Qty: 0 | Refills: 0 | Status: DISCONTINUED | OUTPATIENT
Start: 2017-11-20 | End: 2017-11-20

## 2017-11-20 RX ORDER — VORICONAZOLE 10 MG/ML
370 INJECTION, POWDER, LYOPHILIZED, FOR SOLUTION INTRAVENOUS EVERY 12 HOURS
Qty: 0 | Refills: 0 | Status: DISCONTINUED | OUTPATIENT
Start: 2017-11-20 | End: 2017-11-21

## 2017-11-20 RX ORDER — CEFTRIAXONE 500 MG/1
2 INJECTION, POWDER, FOR SOLUTION INTRAMUSCULAR; INTRAVENOUS EVERY 12 HOURS
Qty: 0 | Refills: 0 | Status: DISCONTINUED | OUTPATIENT
Start: 2017-11-20 | End: 2017-11-22

## 2017-11-20 RX ORDER — VANCOMYCIN HCL 1 G
1250 VIAL (EA) INTRAVENOUS EVERY 8 HOURS
Qty: 0 | Refills: 0 | Status: DISCONTINUED | OUTPATIENT
Start: 2017-11-20 | End: 2017-11-20

## 2017-11-20 RX ORDER — MEPERIDINE HYDROCHLORIDE 50 MG/ML
12.5 INJECTION INTRAMUSCULAR; INTRAVENOUS; SUBCUTANEOUS ONCE
Qty: 0 | Refills: 0 | Status: DISCONTINUED | OUTPATIENT
Start: 2017-11-20 | End: 2017-11-20

## 2017-11-20 RX ORDER — SODIUM CHLORIDE 9 MG/ML
1000 INJECTION, SOLUTION INTRAVENOUS
Qty: 0 | Refills: 0 | Status: DISCONTINUED | OUTPATIENT
Start: 2017-11-20 | End: 2017-11-29

## 2017-11-20 RX ORDER — GLUCAGON INJECTION, SOLUTION 0.5 MG/.1ML
1 INJECTION, SOLUTION SUBCUTANEOUS ONCE
Qty: 0 | Refills: 0 | Status: DISCONTINUED | OUTPATIENT
Start: 2017-11-20 | End: 2017-11-29

## 2017-11-20 RX ORDER — DIPHENHYDRAMINE HCL 50 MG
25 CAPSULE ORAL ONCE
Qty: 0 | Refills: 0 | Status: COMPLETED | OUTPATIENT
Start: 2017-11-20 | End: 2017-11-20

## 2017-11-20 RX ADMIN — HYDROMORPHONE HYDROCHLORIDE 1 MILLIGRAM(S): 2 INJECTION INTRAMUSCULAR; INTRAVENOUS; SUBCUTANEOUS at 16:51

## 2017-11-20 RX ADMIN — Medication 166.67 MILLIGRAM(S): at 02:32

## 2017-11-20 RX ADMIN — Medication 6 MILLIGRAM(S): at 13:29

## 2017-11-20 RX ADMIN — Medication 2: at 12:30

## 2017-11-20 RX ADMIN — OXYCODONE HYDROCHLORIDE 10 MILLIGRAM(S): 5 TABLET ORAL at 05:47

## 2017-11-20 RX ADMIN — OXYCODONE HYDROCHLORIDE 10 MILLIGRAM(S): 5 TABLET ORAL at 18:38

## 2017-11-20 RX ADMIN — Medication 6 MILLIGRAM(S): at 02:32

## 2017-11-20 RX ADMIN — Medication 6 MILLIGRAM(S): at 08:01

## 2017-11-20 RX ADMIN — Medication 650 MILLIGRAM(S): at 02:47

## 2017-11-20 RX ADMIN — VORICONAZOLE 125 MILLIGRAM(S): 10 INJECTION, POWDER, LYOPHILIZED, FOR SOLUTION INTRAVENOUS at 22:30

## 2017-11-20 RX ADMIN — CEFTRIAXONE 100 GRAM(S): 500 INJECTION, POWDER, FOR SOLUTION INTRAMUSCULAR; INTRAVENOUS at 05:48

## 2017-11-20 RX ADMIN — Medication 25 MILLIGRAM(S): at 22:49

## 2017-11-20 RX ADMIN — OXYCODONE HYDROCHLORIDE 10 MILLIGRAM(S): 5 TABLET ORAL at 18:08

## 2017-11-20 RX ADMIN — POLYETHYLENE GLYCOL 3350 17 GRAM(S): 17 POWDER, FOR SOLUTION ORAL at 05:47

## 2017-11-20 RX ADMIN — Medication 650 MILLIGRAM(S): at 15:12

## 2017-11-20 RX ADMIN — MEPERIDINE HYDROCHLORIDE 12.5 MILLIGRAM(S): 50 INJECTION INTRAMUSCULAR; INTRAVENOUS; SUBCUTANEOUS at 22:05

## 2017-11-20 RX ADMIN — SODIUM CHLORIDE 2000 MILLILITER(S): 9 INJECTION INTRAMUSCULAR; INTRAVENOUS; SUBCUTANEOUS at 20:03

## 2017-11-20 RX ADMIN — CEFTRIAXONE 100 GRAM(S): 500 INJECTION, POWDER, FOR SOLUTION INTRAMUSCULAR; INTRAVENOUS at 18:59

## 2017-11-20 RX ADMIN — Medication 166.67 MILLIGRAM(S): at 11:43

## 2017-11-20 RX ADMIN — Medication 650 MILLIGRAM(S): at 08:37

## 2017-11-20 RX ADMIN — Medication 6 MILLIGRAM(S): at 19:53

## 2017-11-20 RX ADMIN — Medication 650 MILLIGRAM(S): at 09:19

## 2017-11-20 RX ADMIN — HYDROMORPHONE HYDROCHLORIDE 1 MILLIGRAM(S): 2 INJECTION INTRAMUSCULAR; INTRAVENOUS; SUBCUTANEOUS at 13:00

## 2017-11-20 RX ADMIN — HYDROMORPHONE HYDROCHLORIDE 1 MILLIGRAM(S): 2 INJECTION INTRAMUSCULAR; INTRAVENOUS; SUBCUTANEOUS at 16:36

## 2017-11-20 RX ADMIN — OXYCODONE HYDROCHLORIDE 10 MILLIGRAM(S): 5 TABLET ORAL at 06:45

## 2017-11-20 RX ADMIN — Medication 25 MILLIGRAM(S): at 02:47

## 2017-11-20 RX ADMIN — MEPERIDINE HYDROCHLORIDE 12.5 MILLIGRAM(S): 50 INJECTION INTRAMUSCULAR; INTRAVENOUS; SUBCUTANEOUS at 21:21

## 2017-11-20 RX ADMIN — HYDROMORPHONE HYDROCHLORIDE 1 MILLIGRAM(S): 2 INJECTION INTRAMUSCULAR; INTRAVENOUS; SUBCUTANEOUS at 12:30

## 2017-11-20 NOTE — CHART NOTE - NSCHARTNOTEFT_GEN_A_CORE
PGY-2 EVENT NOTE  At aprox. 7.45PM was notified by RN pt has rigors, fever 104 oral, BP at the time 110/60, , SO2 98 on RA. I went to assess the patient at the bedside. She was alert and oriented, speaking in full sentences. Physical exam: Lungs clear, abdomen soft NT/ND, pt with visible rigors, shaking, very tender to light touch of skin. Mother, cousin and aunt all at the bedside. No labs drawn as patient has been persistently febrile w/o organism in cx.   -1L NS bolus given  -hypotermia blanket ordered  -since pt received total 2.5 grams of Tylenol today, and pt is allergic to ASA and NSAIDS(anaphylaxis reported) will give demerol 12.5 IV for rigors    Spoke to , ID attending on case, considering pt clinical status is not improving, fever curve yet again going up, will dc vancomycin, start Voriconazole loading 370 mg Q12hrs for 2 doses. Will continue dexamethasone for now as it is unclear what is the etiology of patient symptoms and what is the source of persistent fevers.  -spoke with patient and the mother at length regarding the case, new medications, possible side effects, and further work up  -rheum labs ordered (HE, anti SNP, dsDNA, RF, lupus panel, ACE)  -Brucella AB ordered  -check LFTs and EKG in AM  -mother has decided they would like to be transferred to [facility] tomorrow. They verbalized if transfer not possible they will leave AMA.  -I spoke at length with the night team regarding patient, night CAROLA aware of situation.

## 2017-11-20 NOTE — PROGRESS NOTE ADULT - SUBJECTIVE AND OBJECTIVE BOX
SUBJECTIVE / INTERVAL HPI: Patient seen and examined at bedside.     VITAL SIGNS:  Vital Signs Last 24 Hrs  T(C): 37.3 (20 Nov 2017 05:38), Max: 39.4 (19 Nov 2017 16:59)  T(F): 99.1 (20 Nov 2017 05:38), Max: 102.9 (19 Nov 2017 16:59)  HR: 74 (20 Nov 2017 05:38) (74 - 111)  BP: 92/56 (20 Nov 2017 05:38) (86/49 - 110/65)  BP(mean): --  RR: 18 (20 Nov 2017 05:38) (18 - 20)  SpO2: 96% (20 Nov 2017 05:38) (96% - 100%)    PHYSICAL EXAM:    General: WDWN  HEENT: NC/AT; PERRL, clear conjunctiva  Neck: supple  Cardiovascular: +S1/S2; RRR  Respiratory: CTA b/l; no W/R/R  Gastrointestinal: soft, NT/ND; +BSx4  Extremities: WWP; 2+ peripheral pulses; no edema   Neurological: AAOx3; no focal deficits    MEDICATIONS:  MEDICATIONS  (STANDING):  cefTRIAXone   IVPB 2 Gram(s) IV Intermittent every 12 hours  dexamethasone  Injectable 6 milliGRAM(s) IV Push every 6 hours  influenza   Vaccine 0.5 milliLiter(s) IntraMuscular once  Nesacaine (Chloroprocaine Hcl) 2% Inj. 10 milliLiter(s) 10 milliLiter(s) Local Injection once  oxyCODONE  ER Tablet 10 milliGRAM(s) Oral every 12 hours  pantoprazole  Injectable 40 milliGRAM(s) IV Push every 24 hours  polyethylene glycol 3350 17 Gram(s) Oral every 12 hours  senna 2 Tablet(s) Oral at bedtime    MEDICATIONS  (PRN):  acetaminophen   Tablet 650 milliGRAM(s) Oral every 6 hours PRN For Temp greater than 38 C (100.4 F)  acetaminophen   Tablet. 650 milliGRAM(s) Oral every 6 hours PRN Mild Pain (1 - 3)  diphenhydrAMINE   Capsule 25 milliGRAM(s) Oral at bedtime PRN Rash and/or Itching  HYDROmorphone  Injectable 1 milliGRAM(s) IV Push every 4 hours PRN Severe Pain (7 - 10)  ondansetron Injectable 4 milliGRAM(s) IV Push every 6 hours PRN Nausea      ALLERGIES:  Allergies    aspirin (Hives; Swelling)  ibuprofen (Rash; Flushing; Hives)  lidocaine (Short breath)    Intolerances        LABS:                        12.6   11.8  )-----------( 306      ( 20 Nov 2017 06:45 )             37.6     11-19    135  |  98  |  9   ----------------------------<  98  3.7   |  25  |  0.62    Ca    8.5      19 Nov 2017 07:21        Urinalysis Basic - ( 19 Nov 2017 19:14 )    Color: Yellow / Appearance: Clear / SG: <=1.005 / pH: x  Gluc: x / Ketone: NEGATIVE  / Bili: Negative / Urobili: 0.2 E.U./dL   Blood: x / Protein: NEGATIVE mg/dL / Nitrite: NEGATIVE   Leuk Esterase: NEGATIVE / RBC: x / WBC x   Sq Epi: x / Non Sq Epi: x / Bacteria: x      CAPILLARY BLOOD GLUCOSE          RADIOLOGY & ADDITIONAL TESTS: Reviewed. SUBJECTIVE / INTERVAL HPI: Started on Decadron over the weekend. Continues to be febrile intermittently. Patient seen and examined at bedside. Reports improvement in her symptoms. Decreased pain, more neck mobility, decreased photophobia. Denies CP/SOB/N/V/D/C.     VITAL SIGNS:  Vital Signs Last 24 Hrs  T(C): 37.3 (20 Nov 2017 05:38), Max: 39.4 (19 Nov 2017 16:59)  T(F): 99.1 (20 Nov 2017 05:38), Max: 102.9 (19 Nov 2017 16:59)  HR: 74 (20 Nov 2017 05:38) (74 - 111)  BP: 92/56 (20 Nov 2017 05:38) (86/49 - 110/65)  BP(mean): --  RR: 18 (20 Nov 2017 05:38) (18 - 20)  SpO2: 96% (20 Nov 2017 05:38) (96% - 100%)    PHYSICAL EXAM:    HEENT: NC/AT; PERRL, clear conjunctiva.   Neck: Improvement in stiffness. Can move neck laterally, still difficulty moving A/P.  Respiratory: CTA b/l. No w/r/r.   Cardiovascular: +S1/S2; RRR. No m/r/g.  Abdomen: soft, NT/ND; +BS x4  Extremities: WWP, 2+ peripheral pulses b/l; no LE edema  Skin: normal color and turgor; no rash  Neurological: AAOx3. EOMI laterally, difficulty moving superiorly and inferiorly. photophobia present but improfed. No other CN deficits. Strength/sensation 5/5 throughout. Hyperreflexia lower extremity bilaterally. Babinski negative. +Kernig. +Brudzinski.    MEDICATIONS:  MEDICATIONS  (STANDING):  cefTRIAXone   IVPB 2 Gram(s) IV Intermittent every 12 hours  dexamethasone  Injectable 6 milliGRAM(s) IV Push every 6 hours  influenza   Vaccine 0.5 milliLiter(s) IntraMuscular once  Nesacaine (Chloroprocaine Hcl) 2% Inj. 10 milliLiter(s) 10 milliLiter(s) Local Injection once  oxyCODONE  ER Tablet 10 milliGRAM(s) Oral every 12 hours  pantoprazole  Injectable 40 milliGRAM(s) IV Push every 24 hours  polyethylene glycol 3350 17 Gram(s) Oral every 12 hours  senna 2 Tablet(s) Oral at bedtime    MEDICATIONS  (PRN):  acetaminophen   Tablet 650 milliGRAM(s) Oral every 6 hours PRN For Temp greater than 38 C (100.4 F)  acetaminophen   Tablet. 650 milliGRAM(s) Oral every 6 hours PRN Mild Pain (1 - 3)  diphenhydrAMINE   Capsule 25 milliGRAM(s) Oral at bedtime PRN Rash and/or Itching  HYDROmorphone  Injectable 1 milliGRAM(s) IV Push every 4 hours PRN Severe Pain (7 - 10)  ondansetron Injectable 4 milliGRAM(s) IV Push every 6 hours PRN Nausea      ALLERGIES:  Allergies    aspirin (Hives; Swelling)  ibuprofen (Rash; Flushing; Hives)  lidocaine (Short breath)    Intolerances        LABS:                        12.6   11.8  )-----------( 306      ( 20 Nov 2017 06:45 )             37.6     11-19    135  |  98  |  9   ----------------------------<  98  3.7   |  25  |  0.62    Ca    8.5      19 Nov 2017 07:21        Urinalysis Basic - ( 19 Nov 2017 19:14 )    Color: Yellow / Appearance: Clear / SG: <=1.005 / pH: x  Gluc: x / Ketone: NEGATIVE  / Bili: Negative / Urobili: 0.2 E.U./dL   Blood: x / Protein: NEGATIVE mg/dL / Nitrite: NEGATIVE   Leuk Esterase: NEGATIVE / RBC: x / WBC x   Sq Epi: x / Non Sq Epi: x / Bacteria: x      CAPILLARY BLOOD GLUCOSE          RADIOLOGY & ADDITIONAL TESTS: Reviewed.

## 2017-11-20 NOTE — PROGRESS NOTE ADULT - SUBJECTIVE AND OBJECTIVE BOX
Neurology Follow up note    Name  JIM WILKINS    HPI:  32[place] with PMH of lumbar disc herniation (s/p epidural injection on 8/17) who was at [facility] on 11/13 for severe HA partially relieved by lying down, neck pain, fever, weakness, myalgias, back pain radiating to bl thighs.     Pt reports first experiencing similar symptoms 10 days ago (went to [facility] in [place] - neg w/u: LP, lumbar MRI w/contr, head CT, West Nile virus - discharged w/supportive tx + fiorocet).     Pt then went to Dr Carty and was sent to ED.  At [facility], neg MRI brain, neg RVP, neg BCx, neg HIV, evaluated by ID and Neuro - discharged home and felt better until 11/14 in pm - worsening of headache, photophobia and neck stiffness. She denied any other symptoms.  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   Denied: recent travel, IV drug use, unprotected intercourse, any rashes or bites, invasive procedures other than epidural injection in 08/2017.  In the ER, pt s/p IV hydromorphone and IVF, w/partial relief of headache; neck stiffness, photophobia, chills. ROS otherwise negative. Requesting food.  VS in ED: T(F): 101.5  HR: 108  BP: 89/57 RR: 16 SpO2: 98% (15 Nov 2017 19:50)      Interval History - feels better today- minimal headache        REVIEW OF SYSTEMS    Vital Signs Last 24 Hrs  T(C): 39.4 (20 Nov 2017 08:35), Max: 39.4 (19 Nov 2017 16:59)  T(F): 103 (20 Nov 2017 08:35), Max: 103 (20 Nov 2017 08:35)  HR: 103 (20 Nov 2017 08:35) (74 - 111)  BP: 97/60 (20 Nov 2017 08:35) (86/49 - 110/65)  BP(mean): --  RR: 16 (20 Nov 2017 08:35) (16 - 20)  SpO2: 97% (20 Nov 2017 08:35) (96% - 100%)    Physical Exam-     Mental Status-awake and alert    Cranial Nerves-nl     Gait and station- no foot drop    Motor-moves all 4 extremities    Reflexes- decreased    Sensation- no sensory    Coordination- no tremors    Vascular -    Medications  acetaminophen   Tablet 650 milliGRAM(s) Oral every 6 hours PRN  acetaminophen   Tablet. 650 milliGRAM(s) Oral every 6 hours PRN  cefTRIAXone   IVPB 2 Gram(s) IV Intermittent every 12 hours  dexamethasone  Injectable 6 milliGRAM(s) IV Push every 6 hours  diphenhydrAMINE   Capsule 25 milliGRAM(s) Oral at bedtime PRN  HYDROmorphone  Injectable 1 milliGRAM(s) IV Push every 4 hours PRN  influenza   Vaccine 0.5 milliLiter(s) IntraMuscular once  Nesacaine (Chloroprocaine Hcl) 2% Inj. 10 milliLiter(s) 10 milliLiter(s) Local Injection once  ondansetron Injectable 4 milliGRAM(s) IV Push every 6 hours PRN  oxyCODONE  ER Tablet 10 milliGRAM(s) Oral every 12 hours  pantoprazole  Injectable 40 milliGRAM(s) IV Push every 24 hours  polyethylene glycol 3350 17 Gram(s) Oral every 12 hours  senna 2 Tablet(s) Oral at bedtime  vancomycin  IVPB 1250 milliGRAM(s) IV Intermittent every 8 hours      Lab      Radiology    Assessment- Meningitis    Plan Continue with decadron

## 2017-11-20 NOTE — PROGRESS NOTE ADULT - ASSESSMENT
The blood findings are relatively nonspecific for infection.  The patient might have a mold infection from the steroid injection which must be ruled out.  The defervesced since of the leukocytosis in the CSF may have been from lympholysis. Diagnostic studies must continue.  A viral illness has not been excluded but it's very atypical for this. The steroids would likely have diminished The fever in addition to the constitutional complaints if this was a virus.

## 2017-11-20 NOTE — PROGRESS NOTE ADULT - SUBJECTIVE AND OBJECTIVE BOX
Patient seen in the morning    INTERVAL HPI/OVERNIGHT EVENTS:  Subjectively much better after steroids.  Able to use less pain meds and able to text on her phone.  Ate and tolerated big breakfast.     MEDICATIONS  (STANDING):  cefTRIAXone   IVPB 2 Gram(s) IV Intermittent every 12 hours  dexamethasone  Injectable 6 milliGRAM(s) IV Push every 6 hours  dextrose 5%. 1000 milliLiter(s) (50 mL/Hr) IV Continuous <Continuous>  dextrose 50% Injectable 12.5 Gram(s) IV Push once  dextrose 50% Injectable 25 Gram(s) IV Push once  dextrose 50% Injectable 25 Gram(s) IV Push once  influenza   Vaccine 0.5 milliLiter(s) IntraMuscular once  insulin lispro (HumaLOG) corrective regimen sliding scale   SubCutaneous Before meals and at bedtime  Nesacaine (Chloroprocaine Hcl) 2% Inj. 10 milliLiter(s) 10 milliLiter(s) Local Injection once  oxyCODONE  ER Tablet 10 milliGRAM(s) Oral every 12 hours  polyethylene glycol 3350 17 Gram(s) Oral every 12 hours  senna 2 Tablet(s) Oral at bedtime  vancomycin  IVPB 1250 milliGRAM(s) IV Intermittent every 8 hours    MEDICATIONS  (PRN):  acetaminophen   Tablet 650 milliGRAM(s) Oral every 6 hours PRN For Temp greater than 38 C (100.4 F)  acetaminophen   Tablet. 650 milliGRAM(s) Oral every 6 hours PRN Mild Pain (1 - 3)  dextrose Gel 1 Dose(s) Oral once PRN Blood Glucose LESS THAN 70 milliGRAM(s)/deciliter  diphenhydrAMINE   Capsule 25 milliGRAM(s) Oral at bedtime PRN Rash and/or Itching  glucagon  Injectable 1 milliGRAM(s) IntraMuscular once PRN Glucose LESS THAN 70 milligrams/deciliter  HYDROmorphone  Injectable 1 milliGRAM(s) IV Push every 4 hours PRN Severe Pain (7 - 10)  ondansetron Injectable 4 milliGRAM(s) IV Push every 6 hours PRN Nausea      Allergies    aspirin (Hives; Swelling)  ibuprofen (Rash; Flushing; Hives)  lidocaine (Short breath)    EXAM  Vital Signs Last 24 Hrs  T(C): 39.2 (20 Nov 2017 16:30), Max: 39.4 (19 Nov 2017 16:59)  T(F): 102.5 (20 Nov 2017 16:30), Max: 103 (20 Nov 2017 08:35)  HR: 101 (20 Nov 2017 16:30) (74 - 111)  BP: 98/60 (20 Nov 2017 16:30) (86/49 - 120/69)  BP(mean): --  RR: 18 (20 Nov 2017 16:30) (16 - 18)  SpO2: 98% (20 Nov 2017 16:30) (96% - 100%)  Awake and alert  Appears comfortable but still with some headache when moves around  EOMI  Neck supple  No rash  No jaundice/icterus  RRR  Chest CTA  Abd soft ND, NT  No edema    LABS:                        12.6   11.8  )-----------( 306      ( 20 Nov 2017 06:45 )             37.6     11-20    137  |  97  |  11  ----------------------------<  175<H>  4.0   |  23  |  0.56    Ca    9.1      20 Nov 2017 06:45    TPro  x   /  Alb  3690  /  TBili  x   /  DBili  x   /  AST  x   /  ALT  x   /  AlkPhos  x   11-18      Culture - Acid Fast - CSF (11.18.17 @ 07:49)    Specimen Source: .CSF CSF    Acid Fast Bacilli Smear:   Less than 5 cc of CSF received,  unable to perform AFB smear.    Urinalysis Basic - ( 19 Nov 2017 19:14 )    Color: Yellow / Appearance: Clear / SG: <=1.005 / pH: x  Gluc: x / Ketone: NEGATIVE  / Bili: Negative / Urobili: 0.2 E.U./dL   Blood: x / Protein: NEGATIVE mg/dL / Nitrite: NEGATIVE   Leuk Esterase: NEGATIVE / RBC: x / WBC x   Sq Epi: x / Non Sq Epi: x / Bacteria: x    West Nile Virus IgG/IgM Antibody, Serum (11.16.17 @ 21:39)    West Nile Virus IgG: Negative    West Nile Virus IgM: Negative    West Nile Virus: See Note: No antibodies to WNV detected.  Repeat testing in 10-14  days if clinical suspicion persists.  Test Performed by:  [facility] [id number] [facility]  [address] [place] [address]    Lactate, CSF (11.18.17 @ 23:58)    Lactate, CSF: 1.7 mmol/L        MICROBIOLOGY:  Culture - Blood (11.19.17 @ 13:28)    Specimen Source: .Blood Blood    Culture Results:   No growth at 1 day.    Culture - Blood (11.19.17 @ 13:28)    Specimen Source: .Blood Blood-Venous    Culture Results:   No growth at 1 day.    Culture - Fungal, CSF (11.18.17 @ 00:36)    Specimen Source: .CSF CSF    Culture Results:   Testing in progress    Culture - CSF with Gram Stain . (11.17.17 @ 18:22)    Gram Stain:   Moderate WBC's  No organisms seen    Specimen Source: .CSF CSF    Culture Results:   No growth to date    Culture - CSF with Gram Stain . (11.15.17 @ 17:49)    Gram Stain:   Many White blood cells  No organisms seen    Specimen Source: .CSF CSF    Culture Results:   No growth to date    RADIOLOGY & ADDITIONAL STUDIES:    MR Head w/ IV Cont (11.19.17 @ 16:48) >    EXAM:  MR BRAIN IC                          PROCEDURE DATE:  11/19/2017    Quantity of Contrast in Vial in ml: 7.5 Contrast Used: Gadovist  Quantity of Contrast Wasted in ml: 0         INTERPRETATION:  Sagittal, axial and coronal imaging of the brain was   performed utilizing T1, T2, FLAIR and diffusion-weighted sequences.    Images were acquired both before and following intravenous contrast   administration.    Clinical information: History of meningitis with worsening headache.    The current study is interpreted in conjunction with images from prior   MRI dated 11/16/2017.    The study remains normal. Specifically, there is no pathologic signal or   contrast enhancement within the subarachnoid spaces. The brain parenchyma   demonstrates normal signal. The ventricles, sulci and cisterns are normal   in caliber for the patient's age. There is no extracerebral collection.   Diffusion weighted imaging is normal. The craniocervical junction remains   normal in appearance. The paranasalsinuses and mastoids remain clear.    IMPRESSION:    Normal study.    --------------------------------------------------------------    NM Inflammatory Loc Wholebody, Gallium (11.20.17 @ 16:11) >    EXAM:  NM INFLAMMATORY LOC GALLIUM WB                          PROCEDURE DATE:  11/20/2017           INTERPRETATION:    INFLAMMATION IMAGING - WHOLE BODY    Indication: Fever and headache. This study is being performed to rule out   a spinal abscess or other source of infection.    Procedure: The patient received an intravenous injection of 6 mCi of   gallium-67 citrate on 11/17/2017 and images of the whole body from the   top of the head to the mid thigh were obtained at about 24 and72 hours..    Findings: No abnormal accumulations of the radiopharmaceutical are seen.   There is particularly prominent activity in the lacrimal glands which is   usually normal. When associated with increased activity in the   nasopharynx and in the submandibular glands, this finding is associated   with sarcoidosis.    Impression: No abnormal accumulations of the radiopharmaceutical are   seen. No focal source of infection can be seen and no abnormalities are   seen in the lumbosacral region. Intense lacrimal activity is present and   is probably normal. In association with intense activity in the salivary   glands, this finding is often seen in sarcoidosis.

## 2017-11-20 NOTE — PROGRESS NOTE ADULT - SUBJECTIVE AND OBJECTIVE BOX
NEUROSURGERY PAIN MANAGEMENT PROGRESS NOTE    OVERNIGHT EVENTS:  - Started Oxy-IR 5-10mg q4h PRN for Mod-Severe Pain. Switched to LA with IV Dilaudid dosing for unrelieved breakthrough pain over weekend. Continues to be febrile despite resolving headache.    PLAN/RECOMMENDATIONS:  - Continue with Oxy-ER & Dilaudid IVP. Will consider recommending decrease IVP to 0.5mg, pt reported severe headache yesterday. Even though pain has sig. improved since yesterday and pt reporting that she feels she does not need breakthrough, will continue to monitor for today and re-eval for downtitration tomorrow based on report.     HPI:  32[place] with PMH of lumbar disc herniation (s/p epidural injection on 8/17) who was at [facility] on 11/13 for severe HA partially relieved by lying down, neck pain, fever, weakness, myalgias, back pain radiating to bl thighs.     Pt reports first experiencing similar symptoms 10 days ago (went to [facility] in [place] - neg w/u: LP, lumbar MRI w/contr, head CT, West Nile virus - discharged w/supportive tx + fiorocet).     Pt then went to Dr Carty and was sent to ED.  At [facility], neg MRI brain, neg RVP, neg BCx, neg HIV, evaluated by ID and Neuro - discharged home and felt better until 11/14 in pm - worsening of headache, photophobia and neck stiffness. She denied any other symptoms.  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   Denied: recent travel, IV drug use, unprotected intercourse, any rashes or bites, invasive procedures other than epidural injection in 08/2017.  In the ER, pt s/p IV hydromorphone and IVF, w/partial relief of headache; neck stiffness, photophobia, chills. ROS otherwise negative. Requesting food.  VS in ED: T(F): 101.5  HR: 108  BP: 89/57 RR: 16 SpO2: 98% (15 Nov 2017 19:50)      PAST MEDICAL & SURGICAL HISTORY:  Lumbar disc herniation  No significant past surgical history      FAMILY HISTORY:  No pertinent family history in first degree relatives      Allergies    aspirin (Hives; Swelling)  ibuprofen (Rash; Flushing; Hives)  lidocaine (Short breath)    Intolerances        PAIN MEDICATIONS:  acetaminophen   Tablet 650 milliGRAM(s) Oral every 6 hours PRN  acetaminophen   Tablet. 650 milliGRAM(s) Oral every 6 hours PRN  diphenhydrAMINE   Capsule 25 milliGRAM(s) Oral at bedtime PRN  HYDROmorphone  Injectable 1 milliGRAM(s) IV Push every 4 hours PRN  ondansetron Injectable 4 milliGRAM(s) IV Push every 6 hours PRN  oxyCODONE  ER Tablet 10 milliGRAM(s) Oral every 12 hours    Heme:    Antibiotics:  cefTRIAXone   IVPB 2 Gram(s) IV Intermittent every 12 hours  vancomycin  IVPB 1250 milliGRAM(s) IV Intermittent every 8 hours    Cardiovascular:    GI:  pantoprazole  Injectable 40 milliGRAM(s) IV Push every 24 hours  polyethylene glycol 3350 17 Gram(s) Oral every 12 hours  senna 2 Tablet(s) Oral at bedtime    Endocrine:  dexamethasone  Injectable 6 milliGRAM(s) IV Push every 6 hours  dextrose 50% Injectable 12.5 Gram(s) IV Push once  dextrose 50% Injectable 25 Gram(s) IV Push once  dextrose 50% Injectable 25 Gram(s) IV Push once  dextrose Gel 1 Dose(s) Oral once PRN  glucagon  Injectable 1 milliGRAM(s) IntraMuscular once PRN  insulin lispro (HumaLOG) corrective regimen sliding scale   SubCutaneous Before meals and at bedtime    All Other Medications:  dextrose 5%. 1000 milliLiter(s) IV Continuous <Continuous>  influenza   Vaccine 0.5 milliLiter(s) IntraMuscular once  Nesacaine (Chloroprocaine Hcl) 2% Inj. 10 milliLiter(s) 10 milliLiter(s) Local Injection once      Vital Signs Last 24 Hrs  T(C): 37.9 (20 Nov 2017 12:30), Max: 39.4 (19 Nov 2017 16:59)  T(F): 100.3 (20 Nov 2017 12:30), Max: 103 (20 Nov 2017 08:35)  HR: 88 (20 Nov 2017 12:30) (74 - 111)  BP: 120/69 (20 Nov 2017 12:30) (86/49 - 120/69)  BP(mean): --  RR: 16 (20 Nov 2017 12:30) (16 - 18)  SpO2: 99% (20 Nov 2017 12:30) (96% - 100%)    LABS:                        12.6   11.8  )-----------( 306      ( 20 Nov 2017 06:45 )             37.6     11-20    137  |  97  |  11  ----------------------------<  175<H>  4.0   |  23  |  0.56    Ca    9.1      20 Nov 2017 06:45    TPro  x   /  Alb  3690  /  TBili  x   /  DBili  x   /  AST  x   /  ALT  x   /  AlkPhos  x   11-18      Urinalysis Basic - ( 19 Nov 2017 19:14 )    Color: Yellow / Appearance: Clear / SG: <=1.005 / pH: x  Gluc: x / Ketone: NEGATIVE  / Bili: Negative / Urobili: 0.2 E.U./dL   Blood: x / Protein: NEGATIVE mg/dL / Nitrite: NEGATIVE   Leuk Esterase: NEGATIVE / RBC: x / WBC x   Sq Epi: x / Non Sq Epi: x / Bacteria: x    REVIEW OF SYSTEMS:  CONSTITUTIONAL: Pt continues to be febrile 103 Overnight.  EYES: Today denies visual disturbances. Reporting pressure sensation behind eyes, denies severity as Friday.   ENMT:  No difficulty hearing. No ringing in ear. No throat pain  NECK: On admit, c/o stiffness & neck pain. Now + Brudzinski.  RESPIRATORY: No cough, wheezing; No shortness of breath.   CARDIOVASCULAR: No chest pain, palpitations.   GASTROINTESTINAL: Pt reports passing gas. No bowel movements, sensation of constipation. No abdominal or epigastric pain. Reports episode of vomiting yesterday, but resolved today.  GENITOURINARY: No dysuria, frequency, or incontinence.   NEUROLOGICAL: Mod-Severe headaches, reports that it is a continued pressure-like sensation that is diffuse, but primarily localized behind eyes. Continuous pain well alleviated with LA medications. Denies LOS, denies numbness. C/o back pain and discomfort in b/l LE post Lumbar tap, denies severity or similarity with chronic LE radic pain for which she obtained epidural.   MUSCULOSKELETAL: Incisional back pain. muscle stiffness/cramping No joint pain or swelling.    FUNCTIONAL ASSESSMENT:  PAIN SCORE AT REST:   4-5/10      SCALE USED: (1-10 VNRS)  PAIN SCORE WITH ACTIVITY:  No significant increase       SCALE USED: (1-10 VNRS)    PAIN ASSESSMENT:  Sig. improved generalized headaches, pounding, denies severity of sensation of ocular pressure, continues to report sharp pressure like pain diffusely throughout head with flexion of neck. Reports that pain has been sig. alleviated with LA medications, stating that she has been feeling sig. relief since Friday, reporting that she is req. less of breakthrough since yesterday. Pt denies LOS, denies numbness. continues to c/o back pain and discomfort in b/l LE post Lumbar tap, continues to deny that LBP is similar in severity or pattern as chronic LBP.    PHYSICAL EXAM  GENERAL: NAD  HEAD:  Atraumatic, Normocephalic  NERVOUS SYSTEM:    Alert & Oriented X3, Good concentration;   Cranial nerves grossly intact  Motor exam:  ROTH x 4 5/5 in x 4 extr  Cervical: Slight posterior neck tenderness. Negative Spurlings sign. Negative Putnam's sign. Cervical ROM full, limited on flexion d/t pain. + Brudzinski.     ASSESSMENT:   32F  with PMH of lumbar disc herniation (s/p epidural injection on 8/17) who was at [facility] c/o severe HA     RECOMMENDATIOSN	:   1. Opioids  - Started Oxy-IR 5-10mg q4h PRN for Mod-Severe Pain. Switched to LA with IV Dilaudid dosing for unrelieved breakthrough pain over weekend.   - Continue with Oxy-ER & Dilaudid IVP. Will consider decrease IVP to 0.5mg, pt reported severe headache yesterday. Even though pain has sig. improved since yesterday and pt reporting that she feels she does not need breakthrough, will continue to monitor for today and re-eval for downtitration tomorrow based on report.     2. Neuropathics  Pt currently denies neuropathic pain. No numbness/tingling/electric shock like sensation in LE/UE b.l.  - No indication for neuropathic agents.     3. Adjuvants  Pt not complaining of muscle spasms/cramping in neck currently, intermittent reports, but less intense.    - No indication for muscle relaxants.    4. Prophylactic:   Bowel regimen: Docusate Senna; No BM since admission, MOM today per primary team  Nausea PRN: Zofran PRN for Nausea, pt does not c/o current    5. Functional Goals:   Pt will get OOB today. Pt will resume previous level of activity without impairment from headache.     6. Additional Consults:   None recommended.     7. Additional Labs/Imaging:   None recommended.     8. Follow up, Discharge Planning:   Patient is set for discharge to: Pending workup  Discharge is pending: Pending workup  Pain Management follow up plan: F/u inpatient

## 2017-11-20 NOTE — PROGRESS NOTE ADULT - SUBJECTIVE AND OBJECTIVE BOX
Addendum to my Progress Note    In the presence of the resident, I have discussed the opportunity to test patient's CSF at Dr Westbrook's Lab.  The patient wanted to wait but her Mother wanted to do the test.  They collectively requested more time to think about this.    I have also discussed the case with Dr Cheng who will reach out to the [facility] and inquire whether these molecular diagnostics could be performed by the [facility].  We further discussed the need to test for Brucella and a possibility to add empiric antifungal  - Voriconazole, if patient still febrile tomorrow while on steroids.    These concerns were also related to the Resident.

## 2017-11-20 NOTE — PROGRESS NOTE ADULT - ASSESSMENT
32F w/PMH lumbar disk herniation s/p epidural injection 8/17 and recent neg w/u for similar presentation at [facility] (11/6) and [facility] (11/14) presenting with worsening headache, photophobia, neck stiffness, myalgias, fever, weakness, bl shooting back pain, now with PMN predominance on LP but no identified organisms on GS and negative PCR, concerning for infectious meningitis, but cultures NGTD. PCR negative. CSF studies still pending. Gallium scan pending.

## 2017-11-20 NOTE — PROGRESS NOTE ADULT - ASSESSMENT
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  By now, 2 MRIs and Gallium scan did not substantiate the diagnosis of focal spinal/perispinal/vertebral infection.   Clinical favorable response to steroids but patient also still on antibiotics.  CSF profile on 11/15 is more characteristic of a bacterial infection but no bacteria cultured.  Dramatic change of CSF profile between 11/15 and 11/17.  However, hesitant to stop the abx for now.  Further work up pending.    I have attempted to contact [facility] and a research Lab at [facility] - to search for an "unknown pathogen" in the CSF.  Dr Prem Westbrook's Lab is able to perform 16S Ribosomal RNA and the the VirCapSeq (looking for viral sequences) by Wednesday if CSF delivered by tomorrow AM and the cost is covered.  I am awaiting to speak with patient's Mother about this matter.    RECOMMEND  Continue IV antibiotics today  Hope for CSF analysis at the [facility] within next 24-48 hours

## 2017-11-20 NOTE — PROGRESS NOTE ADULT - PROBLEM SELECTOR PLAN 1
Pt with previously negative workup, now with PMN predominance on CSF and s/s of meningitis. Pt w/Tmax 103.5 and LP s/f neutrophilic predominance, suggestive of bacterial etiology; however, CSF PCR panel, CSF bacterial cultures and gram stain here and from [place] are neg.  - c/w ceftriaxone 2g IV q12h, vancomycin 1g IV q8h  - Order vanc trough 9AM 11/21  - d/c'd acyclovir 600mg IV q8h per Dr. Miller  - Service ID consulted as pt's mother would like second opinion; appreciate recs  - Dr. Osborne following for pain management; recommends oxycodone IR 5mg q4hr PRN for moderate pain and 10mg q4hr PRN for severe pain; Dilaudid 1mg IVP for breakthrough pain; f/u for standing pain medication  -Repeated IR guided LP; pt not clinically improving, and CSF samples for fungal cultures, AFB, cryptococcus, and mycoplasma needed  -f/u CSF and serum Lyme studies from [place] still pending; [phone]   - Repeat procalcitonin negative  - BCx and CSF bacteria cultures NGTD

## 2017-11-20 NOTE — PROGRESS NOTE ADULT - SUBJECTIVE AND OBJECTIVE BOX
The patient feels much better since starting steroids but her fever is still high at 103.5 last evening but she finds that she is constitutionally better.    Physical exam reveals a well-developed and well-nourished woman in moderate pain line in a Bright room with her mother in attendance (her mother is a neurosurgical trauma nurse).  Her vital signs are within normal limits.  The patient is remarkably photophobic.  Examination of her head, ears, eyes, nose, throat is unremarkable.  There is no lymphadenopathy, jugular venous distention or thyroidomegaly in the neck.  The lungs are clear to percussion and auscultation.  There is no axillary or breast pathology bilaterally.  The heart sounds are regular with no auscultatory evidence for murmur, rub,, gallop or ectopy.  The patient has no truncal obesity and there is no palpable or percussible hepatomegaly or splenomegaly.  The patient has no chronic venous stasis changes in the lower extremities.  Neurologically the patient has hypoesthesia in an L12 L3 distribution with positive straight leg raises.  The patient has nuchal rigidity. These meningeal signs however are much better than they were when last evaluated by me on Friday.  Yet, they are still present.

## 2017-11-21 LAB
ALBUMIN SERPL ELPH-MCNC: 3.1 G/DL — LOW (ref 3.3–5)
ALP SERPL-CCNC: 39 U/L — LOW (ref 40–120)
ALT FLD-CCNC: 35 U/L — SIGNIFICANT CHANGE UP (ref 10–45)
ANION GAP SERPL CALC-SCNC: 15 MMOL/L — SIGNIFICANT CHANGE UP (ref 5–17)
AST SERPL-CCNC: 30 U/L — SIGNIFICANT CHANGE UP (ref 10–40)
B BURGDOR DNA SPEC QL NAA+PROBE: NEGATIVE — SIGNIFICANT CHANGE UP
BILIRUB DIRECT SERPL-MCNC: <0.2 MG/DL — SIGNIFICANT CHANGE UP (ref 0–0.2)
BILIRUB INDIRECT FLD-MCNC: >0 MG/DL — LOW (ref 0.2–1)
BILIRUB SERPL-MCNC: 0.2 MG/DL — SIGNIFICANT CHANGE UP (ref 0.2–1.2)
BUN SERPL-MCNC: 13 MG/DL — SIGNIFICANT CHANGE UP (ref 7–23)
CALCIUM SERPL-MCNC: 7.8 MG/DL — LOW (ref 8.4–10.5)
CHLORIDE SERPL-SCNC: 101 MMOL/L — SIGNIFICANT CHANGE UP (ref 96–108)
CMV DNA # UR NAA DL=200: SIGNIFICANT CHANGE UP
CMV DNA SPEC QL NAA+PROBE: SIGNIFICANT CHANGE UP
CO2 SERPL-SCNC: 20 MMOL/L — LOW (ref 22–31)
CREAT SERPL-MCNC: 0.72 MG/DL — SIGNIFICANT CHANGE UP (ref 0.5–1.3)
FUNGITELL: <31 PG/ML — SIGNIFICANT CHANGE UP (ref 0–59)
GLUCOSE BLDC GLUCOMTR-MCNC: 130 MG/DL — HIGH (ref 70–99)
GLUCOSE BLDC GLUCOMTR-MCNC: 146 MG/DL — HIGH (ref 70–99)
GLUCOSE BLDC GLUCOMTR-MCNC: 159 MG/DL — HIGH (ref 70–99)
GLUCOSE BLDC GLUCOMTR-MCNC: 176 MG/DL — HIGH (ref 70–99)
GLUCOSE SERPL-MCNC: 155 MG/DL — HIGH (ref 70–99)
MISCELLANEOUS TEST NAME: SIGNIFICANT CHANGE UP
POTASSIUM SERPL-MCNC: 3.6 MMOL/L — SIGNIFICANT CHANGE UP (ref 3.5–5.3)
POTASSIUM SERPL-SCNC: 3.6 MMOL/L — SIGNIFICANT CHANGE UP (ref 3.5–5.3)
PROT SERPL-MCNC: 6.2 G/DL — SIGNIFICANT CHANGE UP (ref 6–8.3)
R RICKETTSI AB SER-ACNC: NEGATIVE — SIGNIFICANT CHANGE UP
R RICKETTSI IGM SER-ACNC: 0.21 INDEX — SIGNIFICANT CHANGE UP (ref 0–0.89)
RICK SF IGG TITR SER IF: NEGATIVE — SIGNIFICANT CHANGE UP
RICK SF IGM TITR SER IF: 0.21 INDEX — SIGNIFICANT CHANGE UP (ref 0–0.89)
SODIUM SERPL-SCNC: 136 MMOL/L — SIGNIFICANT CHANGE UP (ref 135–145)
SOURCE CMV: SIGNIFICANT CHANGE UP

## 2017-11-21 PROCEDURE — 93010 ELECTROCARDIOGRAM REPORT: CPT

## 2017-11-21 RX ORDER — SODIUM CHLORIDE 9 MG/ML
1000 INJECTION INTRAMUSCULAR; INTRAVENOUS; SUBCUTANEOUS
Qty: 0 | Refills: 0 | Status: DISCONTINUED | OUTPATIENT
Start: 2017-11-21 | End: 2017-11-23

## 2017-11-21 RX ORDER — VANCOMYCIN HCL 1 G
1000 VIAL (EA) INTRAVENOUS ONCE
Qty: 0 | Refills: 0 | Status: COMPLETED | OUTPATIENT
Start: 2017-11-21 | End: 2017-11-21

## 2017-11-21 RX ORDER — ACETAMINOPHEN 500 MG
1000 TABLET ORAL ONCE
Qty: 0 | Refills: 0 | Status: DISCONTINUED | OUTPATIENT
Start: 2017-11-21 | End: 2017-11-21

## 2017-11-21 RX ORDER — ACETAMINOPHEN 500 MG
1000 TABLET ORAL ONCE
Qty: 0 | Refills: 0 | Status: COMPLETED | OUTPATIENT
Start: 2017-11-21 | End: 2017-11-21

## 2017-11-21 RX ORDER — OXYCODONE HYDROCHLORIDE 5 MG/1
5 TABLET ORAL EVERY 4 HOURS
Qty: 0 | Refills: 0 | Status: DISCONTINUED | OUTPATIENT
Start: 2017-11-21 | End: 2017-11-28

## 2017-11-21 RX ORDER — OXYCODONE HYDROCHLORIDE 5 MG/1
10 TABLET ORAL EVERY 4 HOURS
Qty: 0 | Refills: 0 | Status: DISCONTINUED | OUTPATIENT
Start: 2017-11-21 | End: 2017-11-26

## 2017-11-21 RX ORDER — SODIUM CHLORIDE 9 MG/ML
1000 INJECTION INTRAMUSCULAR; INTRAVENOUS; SUBCUTANEOUS ONCE
Qty: 0 | Refills: 0 | Status: COMPLETED | OUTPATIENT
Start: 2017-11-21 | End: 2017-11-21

## 2017-11-21 RX ORDER — VORICONAZOLE 10 MG/ML
250 INJECTION, POWDER, LYOPHILIZED, FOR SOLUTION INTRAVENOUS EVERY 12 HOURS
Qty: 0 | Refills: 0 | Status: DISCONTINUED | OUTPATIENT
Start: 2017-11-21 | End: 2017-11-22

## 2017-11-21 RX ADMIN — Medication 6 MILLIGRAM(S): at 07:43

## 2017-11-21 RX ADMIN — Medication 6 MILLIGRAM(S): at 19:06

## 2017-11-21 RX ADMIN — VORICONAZOLE 125 MILLIGRAM(S): 10 INJECTION, POWDER, LYOPHILIZED, FOR SOLUTION INTRAVENOUS at 22:00

## 2017-11-21 RX ADMIN — Medication 2: at 17:55

## 2017-11-21 RX ADMIN — PANTOPRAZOLE SODIUM 40 MILLIGRAM(S): 20 TABLET, DELAYED RELEASE ORAL at 07:06

## 2017-11-21 RX ADMIN — SENNA PLUS 2 TABLET(S): 8.6 TABLET ORAL at 22:00

## 2017-11-21 RX ADMIN — Medication 2: at 12:31

## 2017-11-21 RX ADMIN — Medication 6 MILLIGRAM(S): at 13:12

## 2017-11-21 RX ADMIN — CEFTRIAXONE 100 GRAM(S): 500 INJECTION, POWDER, FOR SOLUTION INTRAMUSCULAR; INTRAVENOUS at 06:00

## 2017-11-21 RX ADMIN — VORICONAZOLE 125 MILLIGRAM(S): 10 INJECTION, POWDER, LYOPHILIZED, FOR SOLUTION INTRAVENOUS at 09:58

## 2017-11-21 RX ADMIN — Medication 6 MILLIGRAM(S): at 01:43

## 2017-11-21 RX ADMIN — Medication 250 MILLIGRAM(S): at 04:49

## 2017-11-21 RX ADMIN — Medication 400 MILLIGRAM(S): at 04:05

## 2017-11-21 RX ADMIN — SODIUM CHLORIDE 125 MILLILITER(S): 9 INJECTION INTRAMUSCULAR; INTRAVENOUS; SUBCUTANEOUS at 12:30

## 2017-11-21 RX ADMIN — SODIUM CHLORIDE 6000 MILLILITER(S): 9 INJECTION INTRAMUSCULAR; INTRAVENOUS; SUBCUTANEOUS at 04:06

## 2017-11-21 RX ADMIN — SODIUM CHLORIDE 125 MILLILITER(S): 9 INJECTION INTRAMUSCULAR; INTRAVENOUS; SUBCUTANEOUS at 04:06

## 2017-11-21 RX ADMIN — Medication 650 MILLIGRAM(S): at 01:43

## 2017-11-21 RX ADMIN — CEFTRIAXONE 100 GRAM(S): 500 INJECTION, POWDER, FOR SOLUTION INTRAMUSCULAR; INTRAVENOUS at 17:53

## 2017-11-21 NOTE — PROGRESS NOTE ADULT - SUBJECTIVE AND OBJECTIVE BOX
The patient feels generally better although she had a temperature to 104-1/2 last night for which she was given an antipyretic.  The patient's mother wishes that the patient would not receive any antipyretic and this was discussed with the house staff who told me that "it's not a problem as the patient is going to [facility]". The patient complains of dreams merging with reality last evening after having been given Demerol.    Physical exam reveals a well-developed and well-nourished woman in moderate pain line in a Bright room with her mother in attendance (her mother is a neurosurgical trauma nurse).  Her vital signs are within normal limits.  The patient is remarkably photophobic.  Examination of her head, ears, eyes, nose, throat is unremarkable.  There is no lymphadenopathy, jugular venous distention or thyroidomegaly in the neck.  The lungs are clear to percussion and auscultation.  There is no axillary or breast pathology bilaterally.  The heart sounds are regular with no auscultatory evidence for murmur, rub,, gallop or ectopy.  The patient has no truncal obesity and there is no palpable or percussible hepatomegaly or splenomegaly.  The patient has no chronic venous stasis changes in the lower extremities.  Neurologically the patient has hypoesthesia in an L12 L3 distribution with positive straight leg raises.  The patient has nuchal rigidity. These meningeal signs however are much better than they were when last evaluated by me on Yesterday.  Yet, they are still present.

## 2017-11-21 NOTE — PROGRESS NOTE ADULT - ASSESSMENT
The patient had her highest fever after the administration of voriconazole.  I still feel there may be a risk that her epidural injection in [place] may have been contaminated with a mold.  The dexamethasone which clearly has made the patient's condition more tolerable from a pain point of view may also be interfering with the resolution of a mold infection.  This will have to be taken up by the physicians at [facility].  I feel the patient needs at least 24 hours without the use of an antipyretic and without the use of corticosteroid in order to get the proper shape of the fever curve. The patient's hallucinations may have been secondary to fever, sleep deprivation and Demerol.

## 2017-11-21 NOTE — PROGRESS NOTE ADULT - ASSESSMENT
Meningitis of presumed infectious etiology although the cause still remains unknown.  Standard testing so far negative.  Fever, but patient appears dramatically better overall.  Molecular diagnostics researched, labs consulted and arrangements being made for this testing in consultation with the patient and the family that is willing to cover the cost.  Results from Dr Westbrook's lab expected tomorrow - as per my conversation with Dr Westbrook and his staff.    Also, transfer of care to [facility] in progress.  Pending above,  RECOMMEND  Continue Ceftriaxone   Continue Voriconazole with slower infusion.  If hallucinations recur, stop the infusion  Supportive care

## 2017-11-21 NOTE — PROGRESS NOTE ADULT - SUBJECTIVE AND OBJECTIVE BOX
NEUROSURGERY PAIN MANAGEMENT PROGRESS NOTE    OVERNIGHT EVENTS:  - Improvement of intensity/frequency of HAs, continues pattern; continued LB discomfort w/ bn/l LE radic.   - Abx, dcd Vancomycin  - Cont. w/ workup  - Constipation resolved    PLAN/RECOMMENDATIONS:  - Switched to LA with IV Dilaudid dosing for unrelieved breakthrough pain over weekend.   - Dc Oxy-ER & Dilaudid IVP. Pain no longer continuous.  Restart Oxy-IR 5-10mg q4h PRN for Mod-Severe Pain.   - No requirement for IVP Dilaudid, denies nausea      HPI:  32[place] with PMH of lumbar disc herniation (s/p epidural injection on 8/17) who was at [facility] on 11/13 for severe HA partially relieved by lying down, neck pain, fever, weakness, myalgias, back pain radiating to bl thighs.     Pt reports first experiencing similar symptoms 10 days ago (went to [facility] in [place] - neg w/u: LP, lumbar MRI w/contr, head CT, West Nile virus - discharged w/supportive tx + fiorocet).     Pt then went to Dr Carty and was sent to ED.  At [facility], neg MRI brain, neg RVP, neg BCx, neg HIV, evaluated by ID and Neuro - discharged home and felt better until 11/14 in pm - worsening of headache, photophobia and neck stiffness. She denied any other symptoms.  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   Denied: recent travel, IV drug use, unprotected intercourse, any rashes or bites, invasive procedures other than epidural injection in 08/2017.  In the ER, pt s/p IV hydromorphone and IVF, w/partial relief of headache; neck stiffness, photophobia, chills. ROS otherwise negative. Requesting food.  VS in ED: T(F): 101.5  HR: 108  BP: 89/57 RR: 16 SpO2: 98% (15 Nov 2017 19:50)      PAST MEDICAL & SURGICAL HISTORY:  Lumbar disc herniation  No significant past surgical history      FAMILY HISTORY:  No pertinent family history in first degree relatives      Allergies    aspirin (Hives; Swelling)  ibuprofen (Rash; Flushing; Hives)  lidocaine (Short breath)    Intolerances        PAIN MEDICATIONS:  acetaminophen   Tablet 650 milliGRAM(s) Oral every 6 hours PRN  acetaminophen   Tablet. 650 milliGRAM(s) Oral every 6 hours PRN  diphenhydrAMINE   Capsule 25 milliGRAM(s) Oral at bedtime PRN  ondansetron Injectable 4 milliGRAM(s) IV Push every 6 hours PRN  oxyCODONE    IR 10 milliGRAM(s) Oral every 4 hours PRN  oxyCODONE    IR 5 milliGRAM(s) Oral every 4 hours PRN    Heme:    Antibiotics:  cefTRIAXone   IVPB 2 Gram(s) IV Intermittent every 12 hours  voriconazole IVPB 250 milliGRAM(s) IV Intermittent every 12 hours    Cardiovascular:    GI:  pantoprazole    Tablet 40 milliGRAM(s) Oral before breakfast  polyethylene glycol 3350 17 Gram(s) Oral every 12 hours  senna 2 Tablet(s) Oral at bedtime    Endocrine:  dexamethasone  Injectable 6 milliGRAM(s) IV Push every 6 hours  dextrose 50% Injectable 12.5 Gram(s) IV Push once  dextrose 50% Injectable 25 Gram(s) IV Push once  dextrose 50% Injectable 25 Gram(s) IV Push once  dextrose Gel 1 Dose(s) Oral once PRN  glucagon  Injectable 1 milliGRAM(s) IntraMuscular once PRN  insulin lispro (HumaLOG) corrective regimen sliding scale   SubCutaneous Before meals and at bedtime    All Other Medications:  dextrose 5%. 1000 milliLiter(s) IV Continuous <Continuous>  influenza   Vaccine 0.5 milliLiter(s) IntraMuscular once  Nesacaine (Chloroprocaine Hcl) 2% Inj. 10 milliLiter(s) 10 milliLiter(s) Local Injection once  sodium chloride 0.9%. 1000 milliLiter(s) IV Continuous <Continuous>      Vital Signs Last 24 Hrs  T(C): 38.8 (21 Nov 2017 21:17), Max: 39.3 (21 Nov 2017 15:13)  T(F): 101.8 (21 Nov 2017 21:17), Max: 102.8 (21 Nov 2017 15:13)  HR: 88 (21 Nov 2017 21:17) (84 - 107)  BP: 100/65 (21 Nov 2017 21:17) (60/40 - 127/63)  BP(mean): --  RR: 16 (21 Nov 2017 21:17) (16 - 18)  SpO2: 100% (21 Nov 2017 21:17) (95% - 100%)    LABS:                        12.6   11.8  )-----------( 306      ( 20 Nov 2017 06:45 )             37.6     11-21    136  |  101  |  13  ----------------------------<  155<H>  3.6   |  20<L>  |  0.72    Ca    7.8<L>      21 Nov 2017 04:07    TPro  6.2  /  Alb  3.1<L>  /  TBili  0.2  /  DBili  <0.2  /  AST  30  /  ALT  35  /  AlkPhos  39<L>  11-21      REVIEW OF SYSTEMS:  CONSTITUTIONAL: Pt continues to be febrile 102.8 Overnight.  EYES: Denies severe photophobia, very slight. Continues to have, but fewer episodes of pressure sensation behind eyes, denies severity as Friday.   ENMT:  No difficulty hearing. No ringing in ear. No throat pain  NECK: C/t have + Brudzinski.  RESPIRATORY: No cough, wheezing; No shortness of breath.   CARDIOVASCULAR: No chest pain, palpitations.   GASTROINTESTINAL: Pt reports passing gas. 2x bowel movements since yesterday . No episodes of emesis/nausea over past 48hrs.   GENITOURINARY: No dysuria, frequency, or incontinence.   NEUROLOGICAL: Mod painful headaches, still same quality, but less intense/frequent. Denies LOS, denies numbness. C/o back pain and discomfort in b/l LE post Lumbar tap, denies severity or similarity with chronic LE radic pain for which she obtained epidural.   MUSCULOSKELETAL: Back pain r/t LP tap continues, less intense. Denies muscle stiffness/cramping No joint pain or swelling.    FUNCTIONAL ASSESSMENT:  PAIN SCORE AT REST:   Currently denies  SCALE USED: (1-10 VNRS)  PAIN SCORE WITH ACTIVITY:  No significant increase       SCALE USED: (1-10 VNRS)    PAIN ASSESSMENT:  Sig. improved generalized headaches. Today denies, primary pain is r/t pressure like headache which is primarly located behind eyes. Pt  reports lessened severity of sensation of ocular pressure. Reports of fewer episodes of  sharp pressure like pain diffusely throughout head with flexion of neck.Pt denies LOS, denies numbness. continues to c/o back pain and discomfort in b/l LE post Lumbar tap, continues to deny that LBP is similar in severity or pattern as chronic LBP-- + XSLR/XSLR. No rquirements for IVP meds over past 24 hrs.    PHYSICAL EXAM  GENERAL: NAD  HEAD:  Atraumatic, Normocephalic  NERVOUS SYSTEM:    Alert & Oriented X3, Good concentration;   Cranial nerves grossly intact  Motor exam:  ROTH x 4 5/5 in x 4 extr  Cervical: No posterior neck tenderness. Negative Spurlings sign. Negative Putnam's sign. Cervical ROM full, limited on flexion d/t pain. + Brudzinski.   Lumbar: No lumbar tenderess. +SLR b/l, +XSLR b/l.     ASSESSMENT:   32F  with PMH of lumbar disc herniation (s/p epidural injection on 8/17) who was at [facility] c/o severe HA     RECOMMENDATIOSN:   1. Opioids  - Switched to LA with IV Dilaudid dosing for unrelieved breakthrough pain over weekend.   - Dc Oxy-ER & Dilaudid IVP. Pain no longer continuous.  Restart Oxy-IR 5-10mg q4h PRN for Mod-Severe Pain.   - No requirement for IVP Dilaudid, denies nausea    2. Neuropathics  Pt currently denies neuropathic pain. No numbness/tingling/electric shock like sensation in LE/UE b.l.  - No indication for neuropathic agents.     3. Adjuvants  Pt not complaining of muscle spasms/cramping in neck currently, intermittent reports--denies over past 24hrs.    - No indication for muscle relaxants.    4. Prophylactic:   Bowel regimen: Docusate Senna; BM 2x since yesterday with MOM  Nausea PRN: Zofran PRN for Nausea, pt does not c/o current    5. Functional Goals:   Pt will get OOB today. Pt will resume previous level of activity without impairment from headache.     6. Additional Consults:   None recommended.     7. Additional Labs/Imaging:   None recommended.     8. Follow up, Discharge Planning:   Patient is set for discharge to: Possible transfer to [place]  Discharge is pending: Pending workup  Pain Management follow up plan: F/u inpatient

## 2017-11-21 NOTE — CHART NOTE - NSCHARTNOTEFT_GEN_A_CORE
At about 3:20am, was called to bedside by mother as patient was describing seeing many spots in her vision. Vitals at this time: T 102F | HR 107bpm | BP 60/40mmHg (manual) | RR 18 | SpO2 95%. Patient was lethargic and also complained of persistent neck stiffness and pain emanating from her skin.      Physical Exam:   Patient alert and oriented.   Neurological Exam: No asymmetry of the face. Pupils are equal in size and reactive to light b/l. Vision intact. Normal EOM. No visibly nystagmus. No c/o diplopia. Patient able to lift arms and hold against gravity with increased effort. Neck stiffness with movement of neck.   Patient not diaphoretic, but warm to touch. Light touch of body elicits tenderness.     Patient administered 1L bolus NS in addition to maintenance, NS 125cc/hr. Since patient continues to spike high temperatures after vancomycin was removed - dosed 1g IV Vancomycin. PGY-1 Event Note     At about 3:20am, was called to bedside by mother as patient was describing seeing many spots in her vision.   Vitals at this time: T 102F | HR 107bpm | BP 60/40mmHg (manual) | RR 18 | SpO2 95% RA. Patient was lethargic and also complained of persistent neck stiffness and pain emanating from her skin.      Physical Exam:   Patient alert and oriented.   Neurological Exam: No asymmetry of the face. Pupils are equal in size and reactive to light b/l. Vision intact. Normal EOM. No visibly nystagmus. No c/o diplopia. Patient able to lift arms and hold against gravity with increased effort. Neck stiffness with movement of neck.   Patient not diaphoretic, but warm to touch. Light touch of body elicits tenderness.     Patient administered 1L bolus NS in addition to maintenance, NS 125cc/hr in light of profound hypotension.   Since patient continues to spike high temperatures after vancomycin was removed - dosed 1g IV Vancomycin.  Demerol 12.5mg IV was previously given. Patient received 2.5mg Tylenol, administered an additional 1g IV Tylenol given high temperature.    The following labs were drawn: CBC, BMP, Lactate, and 1 set of Blood Cultures.   Lactate blood sample clotted.   CBC significant for: __________  BMP significant for: __________    Following these interventions, vitals as follows: 3:45am, T 101.5F | HR 91bpm | BP 91/65mmHg | RR 18 | SpO2 97% RA with patient reporting that she no longer saw spots in her vision and reported feeling better.     Patient will likely be transferred to [place] per mother and family's wishes. PGY-1 Event Note     At about 3:20am, was called to bedside by mother as patient was describing seeing many spots in her vision.   Vitals at this time: T 102F | HR 107bpm | BP 60/40mmHg (manual) | RR 18 | SpO2 95% RA. Patient was lethargic and also complained of persistent neck stiffness and pain emanating from her skin.      Physical Exam:   Patient alert and oriented.   Neurological Exam: No asymmetry of the face. Pupils are equal in size and reactive to light b/l. Vision intact. Normal EOM. No visibly nystagmus. No c/o diplopia. Patient able to lift arms and hold against gravity with increased effort. Neck stiffness with movement of neck.   Patient not diaphoretic, but warm to touch. Light touch of body elicits tenderness.     Patient administered 1L bolus NS in addition to maintenance, NS 125cc/hr in light of profound hypotension.   Since patient continues to spike high temperatures after vancomycin was removed - dosed 1g IV Vancomycin.  Demerol 12.5mg IV was previously given for rigors earlier in the evening. Patient received 2.5mg Tylenol during the day, administered an additional 1g IV Tylenol given high temperature.    The following labs were drawn: CBC, BMP, Lactate, and 1 set of Blood Cultures.   Lactate and CBC blood samples clotted.     Following these interventions, vitals as follows: 3:45am, T 101.5F | HR 91bpm | BP 91/65mmHg | RR 18 | SpO2 97% RA with patient reporting that she no longer saw spots in her vision and reported feeling better.     Patient will likely be transferred to [place] per mother and family's wishes. PGY-1 Event Note     At about 3:20am, was called to bedside by mother as patient was describing seeing many spots in her vision.   Vitals at this time: T 102F | HR 107bpm | BP 60/40mmHg (manual) | RR 18 | SpO2 95% RA. Patient was lethargic and also complained of persistent neck stiffness and pain emanating from her skin.      Physical Exam:   Patient alert and oriented.   Neurological Exam: No asymmetry of the face. Pupils are equal in size and reactive to light b/l. Vision intact. Normal EOM. No visibly nystagmus. No c/o diplopia. Patient able to lift arms and hold against gravity with increased effort. Neck stiffness with movement of neck.   Patient not diaphoretic, but warm to touch. Light touch of body elicits tenderness.     Patient administered 1L bolus NS in addition to maintenance, NS 125cc/hr in light of profound hypotension.   Since patient continues to spike high temperatures after vancomycin was removed - dosed 1g IV Vancomycin.  Demerol 12.5mg IV was previously given for rigors earlier in the evening. Patient received 2.5mg Tylenol during the day, administered an additional 1g IV Tylenol given high temperature.    The following labs were drawn: CBC, BMP, Lactate, and 1 set of Blood Cultures.   Lactate and CBC blood samples clotted.   BMP: Low Ca++ 7.8, CO2 20     Following these interventions, vitals as follows: 3:45am, T 101.5F | HR 91bpm | BP 91/65mmHg | RR 18 | SpO2 97% RA with patient reporting that she no longer saw spots in her vision and reported feeling better.     Patient will likely be transferred to [place] per mother and family's wishes.    ***********************************************************************************************************************************************************************************************************  5:45am   Patient relayed having hallucinations - visual and auditory. At time of assessment, patient was not actively having these hallucinations. She said she heard laughter, balloons popping, and said she started laughing because she believed her friends were in the room with her. Patient had previously had hallucinations several days prior but had not told anyone about the symptoms. Patient's mother believes it was is because of too high a dose of vancomycin, she read this could be a side effect, though when patient last had hallucinations she had already been on vancomycin for 2 days. Last vancomycin trough was 18. Patient received less than half the 1g Vancomycin ordered previously. Will hold off on continuing further antibiotics until Angela notified.     Vitals at this time: T 99.7 | HR 88bpm | BP 94/45mmHg | RR 17 | SpO2 96% RA     Patient has been febrile all day and has no slept the entire night. Have given her additional ice packs. When assessed again at 5:55am, patient was asleep. PGY-1 Event Note     At about 3:20am, was called to bedside by mother as patient was describing seeing many spots in her vision.   Vitals at this time: T 102F | HR 107bpm | BP 60/40mmHg (manual) | RR 18 | SpO2 95% RA. Patient was lethargic and also complained of persistent neck stiffness and pain emanating from her skin.      Physical Exam:   Patient alert and oriented.   Neurological Exam: No asymmetry of the face. Pupils are equal in size and reactive to light b/l. Vision intact. Normal EOM. No visibly nystagmus. No c/o diplopia. Patient able to lift arms and hold against gravity with increased effort. Neck stiffness with movement of neck.   Patient not diaphoretic, but warm to touch. Light touch of body elicits tenderness.     Patient administered 1L bolus NS in addition to maintenance, NS 125cc/hr in light of profound hypotension.   Since patient continues to spike high temperatures after vancomycin was removed - dosed 1g IV Vancomycin.  Demerol 12.5mg IV was previously given for rigors earlier in the evening. Patient received 2.5mg Tylenol during the day, administered an additional 1g IV Tylenol given high temperature.    The following labs were drawn: CBC, BMP, Lactate, and 1 set of Blood Cultures.   Lactate and CBC blood samples clotted.   BMP: Low Ca++ 7.8, CO2 20     Following these interventions, vitals as follows: 3:45am, T 101.5F | HR 91bpm | BP 91/65mmHg | RR 18 | SpO2 97% RA with patient reporting that she no longer saw spots in her vision and reported feeling better.     Patient will likely be transferred to [place] per mother and family's wishes.    ********************************************************************************************************************  5:45am   Patient relayed having hallucinations - visual and auditory. At time of assessment, patient was not actively having these hallucinations. She said she heard laughter, balloons popping, and said she started laughing because she believed her friends were in the room with her. Patient had previously had hallucinations several days prior but had not told anyone about the symptoms. Patient's mother believes it was is because of too high a dose of vancomycin, she read this could be a side effect, though when patient last had hallucinations she had already been on vancomycin for 2 days. Last vancomycin trough was 18. Patient received less than half the 1g Vancomycin ordered previously. Will hold off on continuing further antibiotics until Angela notified.     Vitals at this time: T 99.7 | HR 88bpm | BP 94/45mmHg | RR 17 | SpO2 96% RA     Patient has been febrile all day and has no slept the entire night. Have given her additional ice packs. When assessed again at 5:55am, patient was asleep. PGY-1 Event Note     At about 3:20am, was called to bedside by mother as patient was describing seeing many spots in her vision.   Vitals at this time: T 102F | HR 107bpm | BP 60/40mmHg (manual) | RR 18 | SpO2 95% RA. Patient was lethargic and also complained of persistent neck stiffness and pain emanating from her skin.      Physical Exam:   Patient alert and oriented.   Neurological Exam: No asymmetry of the face. Pupils are equal in size and reactive to light b/l. Vision intact. Normal EOM. No visibly nystagmus. No c/o diplopia. Patient able to lift arms and hold against gravity with increased effort. Neck stiffness with movement of neck.   Patient not diaphoretic, but warm to touch. Light touch of body elicits tenderness.     Patient administered 1L bolus NS in addition to maintenance, NS 125cc/hr in light of profound hypotension.   Since patient continues to spike high temperatures after vancomycin was removed - dosed 1g IV Vancomycin.  Demerol 12.5mg IV was previously given for rigors earlier in the evening. Patient received 2.5mg Tylenol during the day, administered an additional 1g IV Tylenol given high temperature.    The following labs were drawn: CBC, BMP, Lactate, and 1 set of Blood Cultures.   Lactate and CBC blood samples clotted.   BMP: Low Ca++ 7.8, CO2 20     Following these interventions, vitals as follows: 3:45am, T 101.5F | HR 91bpm | BP 91/65mmHg | RR 18 | SpO2 97% RA with patient reporting that she no longer saw spots in her vision and reported feeling better.     Patient will likely be transferred to [place] per mother and family's wishes.    ********************************************************************************************************************  5:45am   Patient relayed having hallucinations - visual and auditory. At time of assessment, patient was not actively having these hallucinations. She said she heard laughter, balloons popping, and said she started laughing because she believed her friends were in the room with her. Patient had previously had hallucinations several days prior but had not told anyone about the symptoms. Patient's mother believes it was is because of too high a dose of vancomycin, she read this could be a side effect, though when patient last had hallucinations she had already been on vancomycin for 2 days. Last vancomycin trough was 18. Patient received less than half the 1g Vancomycin ordered previously. Will hold off on continuing further antibiotics until Angela notified.     Vitals at this time: T 99.7 | HR 88bpm | BP 94/45mmHg | RR 17 | SpO2 96% RA     Patient has been febrile all day and has no slept the entire night. Have given her additional ice packs. When assessed again at 5:55am, patient was asleep.    Addendum:    Was called to see patient due to hypotension to 60s-70s and tachycardia and febrile to 102 F. Patient was mentating well throughout episode. Patient started IVF on a pressure bag, received 1L NS and BP improved to mid 90s. BMP, CBC, Blood culture drawn (lactate and CBC clotted).   - Ordered for vancomycin 1000mg x1.  - IV tylenol for fever   - Continue on maintenance IVF NS at 125cc/hr.  - To sign out to day team

## 2017-11-21 NOTE — DIETITIAN INITIAL EVALUATION ADULT. - OTHER INFO
31 yo/female admitted with meningitis. Attempted to document initial RD assessment yesterday though unable due to pt being located in IR in [place]. Pt visited in room, awake, alert, pleasant. Pt reports good appetite and intake PTA, no restrictions, healthy diet. Intake currently % per meal; does not like food in-house, pt's mother has been bringing in from outside. Some nausea endorsed over weekend, resolved now. Pt endorses possible reaction to kiwi, but not positive. No difficulty chewing or swallowing. Skin intact pressure-wise.

## 2017-11-21 NOTE — PROGRESS NOTE ADULT - SUBJECTIVE AND OBJECTIVE BOX
Patient is a 32y old  Female who presents with a chief complaint of headache (15 Nov 2017 19:50)        HPI:  32F [place] with PMH of lumbar disc herniation (s/p epidural injection on 8/17) who was at [facility] on 11/13 for severe HA partially relieved by lying down, neck pain, fever, weakness, myalgias, back pain radiating to bl thighs.     Pt reports first experiencing similar symptoms 10 days ago (went to [facility] in [place] - neg w/u: LP, lumbar MRI w/contr, head CT, West Nile virus - discharged w/supportive tx + fiorocet).     Pt then went to Dr Carty and was sent to ED.  At [facility], neg MRI brain, neg RVP, neg BCx, neg HIV, evaluated by ID and Neuro - discharged home and felt better until 11/14 in pm - worsening of headache, photophobia and neck stiffness. She denied any other symptoms.  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   Denied: recent travel, IV drug use, unprotected intercourse, any rashes or bites, invasive procedures other than epidural injection in 08/2017.  In the ER, pt s/p IV hydromorphone and IVF, w/partial relief of headache; neck stiffness, photophobia, chills. ROS otherwise negative. Requesting food.  VS in ED: T(F): 101.5  HR: 108  BP: 89/57 RR: 16 SpO2: 98% (15 Nov 2017 19:50)    Events noted from last evening- fever hallucinations and decrease BP   No headache this AM- no diplopia- next more supple      Allergies  aspirin (Hives; Swelling)  ibuprofen (Rash; Flushing; Hives)  lidocaine (Short breath)      Health Issues  SEPSIS, HEADACHE  No h/o HF  No pertinent family history in first degree relatives  Handoff  MEWS Score  Lumbar disc herniation  No pertinent past medical history  Sepsis  Prophylactic measure  Lumbar disc herniation  Sepsis  Meningitis  No significant past surgical history  HEAD AND NECK PAIN  Intracranial hypertension  1  Headache        FAMILY HISTORY:  No pertinent family history in first degree relatives      MEDICATIONS  (STANDING):  cefTRIAXone   IVPB 2 Gram(s) IV Intermittent every 12 hours  dexamethasone  Injectable 6 milliGRAM(s) IV Push every 6 hours  dextrose 5%. 1000 milliLiter(s) (50 mL/Hr) IV Continuous <Continuous>  dextrose 50% Injectable 12.5 Gram(s) IV Push once  dextrose 50% Injectable 25 Gram(s) IV Push once  dextrose 50% Injectable 25 Gram(s) IV Push once  influenza   Vaccine 0.5 milliLiter(s) IntraMuscular once  insulin lispro (HumaLOG) corrective regimen sliding scale   SubCutaneous Before meals and at bedtime  Nesacaine (Chloroprocaine Hcl) 2% Inj. 10 milliLiter(s) 10 milliLiter(s) Local Injection once  oxyCODONE  ER Tablet 10 milliGRAM(s) Oral every 12 hours  pantoprazole    Tablet 40 milliGRAM(s) Oral before breakfast  polyethylene glycol 3350 17 Gram(s) Oral every 12 hours  senna 2 Tablet(s) Oral at bedtime  sodium chloride 0.9%. 1000 milliLiter(s) (125 mL/Hr) IV Continuous <Continuous>  voriconazole IVPB 370 milliGRAM(s) IV Intermittent every 12 hours    MEDICATIONS  (PRN):  acetaminophen   Tablet 650 milliGRAM(s) Oral every 6 hours PRN For Temp greater than 38 C (100.4 F)  acetaminophen   Tablet. 650 milliGRAM(s) Oral every 6 hours PRN Mild Pain (1 - 3)  dextrose Gel 1 Dose(s) Oral once PRN Blood Glucose LESS THAN 70 milliGRAM(s)/deciliter  diphenhydrAMINE   Capsule 25 milliGRAM(s) Oral at bedtime PRN Rash and/or Itching  glucagon  Injectable 1 milliGRAM(s) IntraMuscular once PRN Glucose LESS THAN 70 milligrams/deciliter  HYDROmorphone  Injectable 1 milliGRAM(s) IV Push every 4 hours PRN Severe Pain (7 - 10)  ondansetron Injectable 4 milliGRAM(s) IV Push every 6 hours PRN Nausea      PAST MEDICAL & SURGICAL HISTORY:  Lumbar disc herniation  No significant past surgical history      Labs                          12.6   11.8  )-----------( 306      ( 20 Nov 2017 06:45 )             37.6     11-21    136  |  101  |  13  ----------------------------<  155<H>  3.6   |  20<L>  |  0.72    Ca    7.8<L>      21 Nov 2017 04:07        Radiology:    Physical Exam    MENTAL STATUS  -Level of Consciousness- awake    Orientation- person, place time  Language- aphasia/ dysarthria  Memory- recent and remote      Cranial Nerve 1- 12  Pupils- equal and reactive  Eye movements-full  Facial - no asymmetry   Lower CN-nl    Gait and Station- no focal findings    MOTOR  Upper-no drift  Lower-nl    Reflexes-intact    Sensation= no sensory level    Cerebellar- no tremors    vascular -no bruits    Assessment- Meningeal symptoms    Plan As per ID for meds   saw patient in the presence of Dr Floyd and resident   Spoke to Dr Snow on 111/20 who indicated current treatment to continue

## 2017-11-21 NOTE — PROGRESS NOTE ADULT - ASSESSMENT
32F w/PMH lumbar disk herniation s/p epidural injection 8/17 and recent neg w/u for similar presentation at  (11/6) and [facility] (11/14) presenting with worsening headache, photophobia, neck stiffness, myalgias, fever, weakness, bl shooting back pain, now with PMN predominance on LP but no identified organisms on GS and negative PCR, concerning for infectious meningitis, but cultures NGTD. PCR negative. Many CSF studies still pending. Gallium scan negative. Patient reports continued fevers and chills and hallucinations after initiation of voriconazole overnight.     It is possible that this patient does have a fungal meningitis as fungal cultures take a long time to grow, bacterial is less likely as the cultures have been negative. I believe that the patient has a viral meningitis which has been slow to resolve. The fevers do persist, but clinically the patient is getting better with improvement in her overall meningeal symptoms.    Patient and family are discussing and planning transfer to [place] for further care.

## 2017-11-21 NOTE — PROGRESS NOTE ADULT - PROBLEM SELECTOR PLAN 1
Pt with previously negative workup, now with PMN predominance on CSF and s/s of meningitis. Pt w/Tmax 103.5 and LP s/f neutrophilic predominance, suggestive of bacterial etiology; however, CSF PCR panel, CSF bacterial cultures and gram stain here and from [place] are neg.  - c/w ceftriaxone 2g IV q12h, Voriconazole if the patient can tolerate the medication.  - c/w decadron 6mg q6hr. Consider taper as patient has been improving.  - Vancomycin discontinued per ID.  - d/c'd acyclovir 600mg IV q8h per Dr. Miller  - Service ID consulted as pt's mother would like second opinion; appreciate recs  - Dr. Osborne following for pain management, appreciate recs.  - Dr. Miller following and coordinating outside lab studies.  - BCx and CSF bacteria cultures NGTD

## 2017-11-21 NOTE — PROGRESS NOTE ADULT - SUBJECTIVE AND OBJECTIVE BOX
INTERVAL HPI/OVERNIGHT EVENTS:  Events reviewed with house staff, Dr Cee and the patient and her Mother.  Temp down this AM.  I was notified about the fever of 104 last late PM/evening.    Jointly with Dr Cheng we believed there could be a possibility of drug fever and at the same time cultures are not substantiating the ongoing need for IV Vanco.  Therefore, I recommended to d/c Vanco.  At the same time, we were uncomfortable to have the patient on steroids and not on antifungal coverage given a possibility of a fungal infection. Therefore, Voriconazole was started.  Pt c/o hallucinations - both visual and auditory, now resolved and happening after Voriconazole and I believe they were due to Voriconazole.  Patient seen with house staff this AM  I was also informed that patient is requesting transfer to [facility].      Also in the meantime, I have been working on the arrangements to send patient's CSF to:  1. Dr Westbrook's Lab at the [facility] [place] for 16S Ribosomal RNA and VirCapseq (viral sequences)  2. [facility] for 18S Ribosomal RNA (this could help us with more rapid diagnosis of a fungal infection)	    MEDICATIONS  (STANDING):  CefTRIAXone   IVPB 2 Gram(s) IV Intermittent every 12 hours  dexamethasone  Injectable 6 milliGRAM(s) IV Push every 6 hours  dextrose 5%. 1000 milliLiter(s) (50 mL/Hr) IV Continuous <Continuous>  dextrose 50% Injectable 12.5 Gram(s) IV Push once  dextrose 50% Injectable 25 Gram(s) IV Push once  dextrose 50% Injectable 25 Gram(s) IV Push once  influenza   Vaccine 0.5 milliLiter(s) IntraMuscular once  insulin lispro (HumaLOG) corrective regimen sliding scale   SubCutaneous Before meals and at bedtime  Nesacaine (Chloroprocaine Hcl) 2% Inj. 10 milliLiter(s) 10 milliLiter(s) Local Injection once  oxyCODONE  ER Tablet 10 milliGRAM(s) Oral every 12 hours  pantoprazole    Tablet 40 milliGRAM(s) Oral before breakfast  polyethylene glycol 3350 17 Gram(s) Oral every 12 hours  senna 2 Tablet(s) Oral at bedtime  sodium chloride 0.9%. 1000 milliLiter(s) (125 mL/Hr) IV Continuous <Continuous>  voriconazole IVPB 370 milliGRAM(s) IV Intermittent every 12 hours    MEDICATIONS  (PRN):  acetaminophen   Tablet 650 milliGRAM(s) Oral every 6 hours PRN For Temp greater than 38 C (100.4 F)  acetaminophen   Tablet. 650 milliGRAM(s) Oral every 6 hours PRN Mild Pain (1 - 3)  dextrose Gel 1 Dose(s) Oral once PRN Blood Glucose LESS THAN 70 milliGRAM(s)/deciliter  diphenhydrAMINE   Capsule 25 milliGRAM(s) Oral at bedtime PRN Rash and/or Itching  glucagon  Injectable 1 milliGRAM(s) IntraMuscular once PRN Glucose LESS THAN 70 milligrams/deciliter  HYDROmorphone  Injectable 1 milliGRAM(s) IV Push every 4 hours PRN Severe Pain (7 - 10)  ondansetron Injectable 4 milliGRAM(s) IV Push every 6 hours PRN Nausea      Allergies    aspirin (Hives; Swelling)  ibuprofen (Rash; Flushing; Hives)  lidocaine (Short breath)    EXAM  Vital Signs Last 24 Hrs  T(C): 38.4 (21 Nov 2017 08:50), Max: 39.5 (20 Nov 2017 20:09)  T(F): 101.2 (21 Nov 2017 08:50), Max: 103.1 (20 Nov 2017 20:09)  HR: 84 (21 Nov 2017 08:50) (84 - 112)  BP: 99/62 (21 Nov 2017 08:50) (60/40 - 120/69)  BP(mean): --  RR: 16 (21 Nov 2017 08:50) (16 - 189)  SpO2: 99% (21 Nov 2017 08:50) (95% - 100%)  Appears very comfortable on RA  Completely lucid and supple neck and no apparent focal neurological deficits  EOMI  RRR  Chest CTA  Abd soft NT  LE no edema      LABS:                        12.6   11.8  )-----------( 306      ( 20 Nov 2017 06:45 )             37.6     11-21    136  |  101  |  13  ----------------------------<  155<H>  3.6   |  20<L>  |  0.72    Ca    7.8<L>      21 Nov 2017 04:07      Urinalysis Basic - ( 19 Nov 2017 19:14 )    Color: Yellow / Appearance: Clear / SG: <=1.005 / pH: x  Gluc: x / Ketone: NEGATIVE  / Bili: Negative / Urobili: 0.2 E.U./dL   Blood: x / Protein: NEGATIVE mg/dL / Nitrite: NEGATIVE   Leuk Esterase: NEGATIVE / RBC: x / WBC x   Sq Epi: x / Non Sq Epi: x / Bacteria: x      Fungitell (11.17.17 @ 16:42)    Fungitell: <31: Interpretation:       The Fungitell assay does not detect certain fungal species such as  the  genus Cryptococcus (Nael et al. 1991) which produces very low  levels of (1-3)-Beta-D-Glucan. The assay also does not detect the  Zygomycetes such as Absidia, Mucor and Rhizopus (Speedy et al.  1994) which are not known to produce (1,3)-Beta-D-Glucan. In addition,  the yeast phase of Blastomyces dermatitidis produces little (1,3)-Beta-D-  Glucan and may not be detected by the assay (Ida et.al. 2007).  Reference range:  Less than 60 pg/mL. Glucan values of less than 60 pg/mL are interpreted  as negative. Glucan values for 60 - 79 pg/mL are interpreted as  indeterminate,  and suggest possible fungal infection. Additional sampling and testing of  sera  is required to interpret the results.  Glucan values of greater than or equal to 80 pg/mL are interpreted as  positive.  Due to the potential for environmental contamination when transfered to  pour-off tubes, which can lead to false positiveresults, interpret  positive  results from samples provided in pour-off tubes with caution. Results  should be used in conjunction with clinical findings, and should not form  the sole basis for a diagnosis or treatment decision. The Fungitell test  is approved  or cleared for in vitro use by the U.S. Food and Drug Administration.  Modifications  to the approved package insert have been made and the performance  characteristics  for these modifications were determined by [employer]. pg/mL    West Nile Virus IgG/IgM Antibody, Serum (11.16.17 @ 21:39)    West Nile Virus IgG: Negative    West Nile Virus IgM: Negative    West Nile Virus: See Note: No antibodies to WNV detected.  Repeat testing in 10-14  days if clinical suspicion persists.  Test Performed by:  [facility]  [address] [place] [address]      MICROBIOLOGY:    Culture - Blood (11.19.17 @ 13:28)    Specimen Source: .Blood Blood    Culture Results:   No growth at 1 day.    Culture - Blood (11.19.17 @ 13:28)    Specimen Source: .Blood Blood-Venous    Culture Results:   No growth at 1 day.    Culture - Fungal, CSF (11.18.17 @ 00:36)    Specimen Source: .CSF CSF    Culture Results:   Testing in progress    Culture - CSF with Gram Stain . (11.17.17 @ 18:22)    Gram Stain:   Moderate WBC's  No organisms seen    Specimen Source: .CSF CSF    Culture Results:   No growth to date    Culture - CSF with Gram Stain . (11.15.17 @ 17:49)    Gram Stain:   Many White blood cells  No organisms seen    Specimen Source: .CSF CSF    Culture Results:   No growth to date      RADIOLOGY & ADDITIONAL STUDIES:    NM Inflammatory Loc Wholebody, Gallium (11.20.17 @ 16:11) >    EXAM:  NM INFLAMMATORY LOC GALLIUM WB                          PROCEDURE DATE:  11/20/2017             INTERPRETATION:    INFLAMMATION IMAGING - WHOLE BODY    Indication: Fever and headache. This study is being performed to rule out   a spinal abscess or other source of infection.    Procedure: The patient received an intravenous injection of 6 mCi of   gallium-67 citrate on 11/17/2017 and images of the whole body from the   top of the head to the mid thigh were obtained at about 24 and72 hours..    Findings: No abnormal accumulations of the radiopharmaceutical are seen.   There is particularly prominent activity in the lacrimal glands which is   usually normal. When associated with increased activity in the   nasopharynx and in the submandibular glands, this finding is associated   with sarcoidosis.    Impression: No abnormal accumulations of the radiopharmaceutical are   seen. No focal source of infection can be seen and no abnormalities are   seen in the lumbosacral region. Intense lacrimal activity is present and   is probably normal. In association with intense activity in the salivary   glands, this finding is often seen in sarcoidosis.

## 2017-11-21 NOTE — PROGRESS NOTE ADULT - SUBJECTIVE AND OBJECTIVE BOX
SUBJECTIVE / INTERVAL HPI: See chart notes for overnight events. Patient seen and examined at bedside. Patient reports fevers and chills with hallucinations overnight, but reports improvement in her photophobia, more mobility of her neck and decreased pain. Denies CP/SOB/N/V/D/C. ROS otherwise negative.    VITAL SIGNS:  Vital Signs Last 24 Hrs  T(C): 38.4 (21 Nov 2017 08:50), Max: 39.5 (20 Nov 2017 20:09)  T(F): 101.2 (21 Nov 2017 08:50), Max: 103.1 (20 Nov 2017 20:09)  HR: 84 (21 Nov 2017 08:50) (84 - 112)  BP: 99/62 (21 Nov 2017 08:50) (60/40 - 112/66)  BP(mean): --  RR: 16 (21 Nov 2017 08:50) (16 - 189)  SpO2: 99% (21 Nov 2017 08:50) (95% - 100%)    PHYSICAL EXAM:    HEENT: NC/AT; PERRL, clear conjunctiva.   Neck: Improvement in stiffness. Can move neck laterally, still difficulty moving A/P but improved.  Respiratory: CTA b/l. No w/r/r.   Cardiovascular: +S1/S2; RRR. No m/r/g.  Abdomen: soft, NT/ND; +BS x4  Extremities: WWP, 2+ peripheral pulses b/l; no LE edema  Skin: normal color and turgor; no rash  Neurological: AAOx3. EOMI laterally, difficulty moving superiorly and inferiorly. photophobia present but improved. No other CN deficits. Strength/sensation 5/5 throughout. Hyperreflexia lower extremity bilaterally. Babinski negative. +Kernig. +Brudzinski.      MEDICATIONS:  MEDICATIONS  (STANDING):  cefTRIAXone   IVPB 2 Gram(s) IV Intermittent every 12 hours  dexamethasone  Injectable 6 milliGRAM(s) IV Push every 6 hours  dextrose 5%. 1000 milliLiter(s) (50 mL/Hr) IV Continuous <Continuous>  dextrose 50% Injectable 12.5 Gram(s) IV Push once  dextrose 50% Injectable 25 Gram(s) IV Push once  dextrose 50% Injectable 25 Gram(s) IV Push once  influenza   Vaccine 0.5 milliLiter(s) IntraMuscular once  insulin lispro (HumaLOG) corrective regimen sliding scale   SubCutaneous Before meals and at bedtime  Nesacaine (Chloroprocaine Hcl) 2% Inj. 10 milliLiter(s) 10 milliLiter(s) Local Injection once  oxyCODONE  ER Tablet 10 milliGRAM(s) Oral every 12 hours  pantoprazole    Tablet 40 milliGRAM(s) Oral before breakfast  polyethylene glycol 3350 17 Gram(s) Oral every 12 hours  senna 2 Tablet(s) Oral at bedtime  sodium chloride 0.9%. 1000 milliLiter(s) (125 mL/Hr) IV Continuous <Continuous>  voriconazole IVPB 370 milliGRAM(s) IV Intermittent every 12 hours    MEDICATIONS  (PRN):  acetaminophen   Tablet 650 milliGRAM(s) Oral every 6 hours PRN For Temp greater than 38 C (100.4 F)  acetaminophen   Tablet. 650 milliGRAM(s) Oral every 6 hours PRN Mild Pain (1 - 3)  dextrose Gel 1 Dose(s) Oral once PRN Blood Glucose LESS THAN 70 milliGRAM(s)/deciliter  diphenhydrAMINE   Capsule 25 milliGRAM(s) Oral at bedtime PRN Rash and/or Itching  glucagon  Injectable 1 milliGRAM(s) IntraMuscular once PRN Glucose LESS THAN 70 milligrams/deciliter  HYDROmorphone  Injectable 1 milliGRAM(s) IV Push every 4 hours PRN Severe Pain (7 - 10)  ondansetron Injectable 4 milliGRAM(s) IV Push every 6 hours PRN Nausea      ALLERGIES:  Allergies    aspirin (Hives; Swelling)  ibuprofen (Rash; Flushing; Hives)  lidocaine (Short breath)    Intolerances        LABS:                        12.6   11.8  )-----------( 306      ( 20 Nov 2017 06:45 )             37.6     11-21    136  |  101  |  13  ----------------------------<  155<H>  3.6   |  20<L>  |  0.72    Ca    7.8<L>      21 Nov 2017 04:07    TPro  6.2  /  Alb  3.1<L>  /  TBili  0.2  /  DBili  <0.2  /  AST  30  /  ALT  35  /  AlkPhos  39<L>  11-21      Urinalysis Basic - ( 19 Nov 2017 19:14 )    Color: Yellow / Appearance: Clear / SG: <=1.005 / pH: x  Gluc: x / Ketone: NEGATIVE  / Bili: Negative / Urobili: 0.2 E.U./dL   Blood: x / Protein: NEGATIVE mg/dL / Nitrite: NEGATIVE   Leuk Esterase: NEGATIVE / RBC: x / WBC x   Sq Epi: x / Non Sq Epi: x / Bacteria: x      CAPILLARY BLOOD GLUCOSE      POCT Blood Glucose.: 159 mg/dL (21 Nov 2017 12:29)      RADIOLOGY & ADDITIONAL TESTS: Reviewed.

## 2017-11-22 LAB
24R-OH-CALCIDIOL SERPL-MCNC: 24 NG/ML — LOW (ref 30–80)
ALBUMIN SERPL ELPH-MCNC: 3.4 G/DL — SIGNIFICANT CHANGE UP (ref 3.3–5)
ALP SERPL-CCNC: 38 U/L — LOW (ref 40–120)
ALT FLD-CCNC: 48 U/L — HIGH (ref 10–45)
ANION GAP SERPL CALC-SCNC: 16 MMOL/L — SIGNIFICANT CHANGE UP (ref 5–17)
AST SERPL-CCNC: 15 U/L — SIGNIFICANT CHANGE UP (ref 10–40)
B HENSELAE IGG SER-ACNC: SIGNIFICANT CHANGE UP TITER
B HENSELAE IGM SER-ACNC: SIGNIFICANT CHANGE UP TITER
B QUINTANA IGG SERPL-ACNC: SIGNIFICANT CHANGE UP TITER
B QUINTANA IGM SER-ACNC: SIGNIFICANT CHANGE UP TITER
BILIRUB SERPL-MCNC: 0.3 MG/DL — SIGNIFICANT CHANGE UP (ref 0.2–1.2)
BUN SERPL-MCNC: 11 MG/DL — SIGNIFICANT CHANGE UP (ref 7–23)
CALCIUM SERPL-MCNC: 8.4 MG/DL — SIGNIFICANT CHANGE UP (ref 8.4–10.5)
CHLORIDE SERPL-SCNC: 97 MMOL/L — SIGNIFICANT CHANGE UP (ref 96–108)
CO2 SERPL-SCNC: 22 MMOL/L — SIGNIFICANT CHANGE UP (ref 22–31)
CONFIRM APTT STACLOT: POSITIVE
CREAT SERPL-MCNC: 0.53 MG/DL — SIGNIFICANT CHANGE UP (ref 0.5–1.3)
DRVVT SCREEN TO CONFIRM RATIO: (no result)
GLUCOSE BLDC GLUCOMTR-MCNC: 129 MG/DL — HIGH (ref 70–99)
GLUCOSE BLDC GLUCOMTR-MCNC: 136 MG/DL — HIGH (ref 70–99)
GLUCOSE BLDC GLUCOMTR-MCNC: 178 MG/DL — HIGH (ref 70–99)
GLUCOSE BLDC GLUCOMTR-MCNC: 218 MG/DL — HIGH (ref 70–99)
GLUCOSE SERPL-MCNC: 139 MG/DL — HIGH (ref 70–99)
LA NT DPL PPP QL: 38.7 SEC — SIGNIFICANT CHANGE UP
LEPTOSPIRA AB TITR SER: NEGATIVE — SIGNIFICANT CHANGE UP
M PNEUMO IGG SER IA-ACNC: 1.21 INDEX — HIGH
M PNEUMO IGG SER IA-ACNC: POSITIVE
M PNEUMO IGM SER-ACNC: 326 UNITS/ML — SIGNIFICANT CHANGE UP
MYCOPLASMA AG SPEC QL: NEGATIVE — SIGNIFICANT CHANGE UP
POTASSIUM SERPL-MCNC: 4.2 MMOL/L — SIGNIFICANT CHANGE UP (ref 3.5–5.3)
POTASSIUM SERPL-SCNC: 4.2 MMOL/L — SIGNIFICANT CHANGE UP (ref 3.5–5.3)
PROT SERPL-MCNC: 6 G/DL — SIGNIFICANT CHANGE UP (ref 6–8.3)
RHEUMATOID FACT SERPL-ACNC: <7 IU/ML — SIGNIFICANT CHANGE UP (ref 0–13.9)
SODIUM SERPL-SCNC: 135 MMOL/L — SIGNIFICANT CHANGE UP (ref 135–145)
THROMBIN TIME: 24.4 SEC — HIGH (ref 17.6–24)

## 2017-11-22 PROCEDURE — 93010 ELECTROCARDIOGRAM REPORT: CPT

## 2017-11-22 RX ORDER — VORICONAZOLE 10 MG/ML
250 INJECTION, POWDER, LYOPHILIZED, FOR SOLUTION INTRAVENOUS EVERY 12 HOURS
Qty: 0 | Refills: 0 | Status: DISCONTINUED | OUTPATIENT
Start: 2017-11-22 | End: 2017-11-28

## 2017-11-22 RX ORDER — ACETAMINOPHEN 500 MG
650 TABLET ORAL ONCE
Qty: 0 | Refills: 0 | Status: COMPLETED | OUTPATIENT
Start: 2017-11-22 | End: 2017-11-22

## 2017-11-22 RX ADMIN — Medication 325 MILLIGRAM(S): at 22:10

## 2017-11-22 RX ADMIN — POLYETHYLENE GLYCOL 3350 17 GRAM(S): 17 POWDER, FOR SOLUTION ORAL at 06:30

## 2017-11-22 RX ADMIN — Medication 6 MILLIGRAM(S): at 14:31

## 2017-11-22 RX ADMIN — Medication 650 MILLIGRAM(S): at 13:04

## 2017-11-22 RX ADMIN — Medication 2: at 11:55

## 2017-11-22 RX ADMIN — PANTOPRAZOLE SODIUM 40 MILLIGRAM(S): 20 TABLET, DELAYED RELEASE ORAL at 06:30

## 2017-11-22 RX ADMIN — VORICONAZOLE 250 MILLIGRAM(S): 10 INJECTION, POWDER, LYOPHILIZED, FOR SOLUTION INTRAVENOUS at 22:48

## 2017-11-22 RX ADMIN — SODIUM CHLORIDE 125 MILLILITER(S): 9 INJECTION INTRAMUSCULAR; INTRAVENOUS; SUBCUTANEOUS at 11:56

## 2017-11-22 RX ADMIN — CEFTRIAXONE 100 GRAM(S): 500 INJECTION, POWDER, FOR SOLUTION INTRAMUSCULAR; INTRAVENOUS at 06:30

## 2017-11-22 RX ADMIN — Medication 6 MILLIGRAM(S): at 20:02

## 2017-11-22 RX ADMIN — Medication 6 MILLIGRAM(S): at 07:58

## 2017-11-22 RX ADMIN — Medication 650 MILLIGRAM(S): at 12:04

## 2017-11-22 RX ADMIN — Medication 6 MILLIGRAM(S): at 02:37

## 2017-11-22 RX ADMIN — CEFTRIAXONE 100 GRAM(S): 500 INJECTION, POWDER, FOR SOLUTION INTRAMUSCULAR; INTRAVENOUS at 18:23

## 2017-11-22 RX ADMIN — SODIUM CHLORIDE 125 MILLILITER(S): 9 INJECTION INTRAMUSCULAR; INTRAVENOUS; SUBCUTANEOUS at 07:58

## 2017-11-22 RX ADMIN — SODIUM CHLORIDE 125 MILLILITER(S): 9 INJECTION INTRAMUSCULAR; INTRAVENOUS; SUBCUTANEOUS at 22:53

## 2017-11-22 RX ADMIN — VORICONAZOLE 125 MILLIGRAM(S): 10 INJECTION, POWDER, LYOPHILIZED, FOR SOLUTION INTRAVENOUS at 11:55

## 2017-11-22 RX ADMIN — SENNA PLUS 2 TABLET(S): 8.6 TABLET ORAL at 22:48

## 2017-11-22 RX ADMIN — Medication 4: at 18:23

## 2017-11-22 NOTE — PROGRESS NOTE ADULT - SUBJECTIVE AND OBJECTIVE BOX
INTERVAL HPI/OVERNIGHT EVENTS:  Overall better with improved fevers but some photophobia this AM.  Eating and drinking OK  Still hallucinations with voriconazole.      MEDICATIONS  (STANDING):  cefTRIAXone   IVPB 2 Gram(s) IV Intermittent every 12 hours  dexamethasone  Injectable 6 milliGRAM(s) IV Push every 6 hours  dextrose 5%. 1000 milliLiter(s) (50 mL/Hr) IV Continuous <Continuous>  dextrose 50% Injectable 12.5 Gram(s) IV Push once  dextrose 50% Injectable 25 Gram(s) IV Push once  dextrose 50% Injectable 25 Gram(s) IV Push once  influenza   Vaccine 0.5 milliLiter(s) IntraMuscular once  insulin lispro (HumaLOG) corrective regimen sliding scale   SubCutaneous Before meals and at bedtime  pantoprazole    Tablet 40 milliGRAM(s) Oral before breakfast  polyethylene glycol 3350 17 Gram(s) Oral every 12 hours  senna 2 Tablet(s) Oral at bedtime  sodium chloride 0.9%. 1000 milliLiter(s) (125 mL/Hr) IV Continuous <Continuous>  voriconazole 250 milliGRAM(s) Oral every 12 hours    MEDICATIONS  (PRN):  acetaminophen   Tablet 650 milliGRAM(s) Oral every 6 hours PRN For Temp greater than 38 C (100.4 F)  acetaminophen   Tablet. 650 milliGRAM(s) Oral every 6 hours PRN Mild Pain (1 - 3)  dextrose Gel 1 Dose(s) Oral once PRN Blood Glucose LESS THAN 70 milliGRAM(s)/deciliter  diphenhydrAMINE   Capsule 25 milliGRAM(s) Oral at bedtime PRN Rash and/or Itching  glucagon  Injectable 1 milliGRAM(s) IntraMuscular once PRN Glucose LESS THAN 70 milligrams/deciliter  ondansetron Injectable 4 milliGRAM(s) IV Push every 6 hours PRN Nausea  oxyCODONE    IR 10 milliGRAM(s) Oral every 4 hours PRN Severe Pain (7 - 10)  oxyCODONE    IR 5 milliGRAM(s) Oral every 4 hours PRN Moderate Pain (4 - 6)      Allergies    aspirin (Hives; Swelling)  ibuprofen (Rash; Flushing; Hives)  lidocaine (Short breath)    EXAM  Vital Signs Last 24 Hrs  T(C): 37.3 (22 Nov 2017 08:58), Max: 39.3 (21 Nov 2017 15:13)  T(F): 99.1 (22 Nov 2017 08:58), Max: 102.8 (21 Nov 2017 15:13)  HR: 78 (22 Nov 2017 08:58) (77 - 99)  BP: 92/59 (22 Nov 2017 08:58) (92/59 - 127/63)  BP(mean): --  RR: 16 (22 Nov 2017 08:58) (16 - 18)  SpO2: 95% (22 Nov 2017 08:58) (94% - 100%)  Awake and alert  Neck supple  No rash  No phlebitis  Hands with very mild swelling in fingers but no erythema  No changes otherwise    LABS:    11-22    135  |  97  |  11  ----------------------------<  139<H>  4.2   |  22  |  0.53    Ca    8.4      22 Nov 2017 06:47    TPro  6.0  /  Alb  3.4  /  TBili  0.3  /  DBili  x   /  AST  15  /  ALT  48<H>  /  AlkPhos  38<L>  11-22    [place] Spotted Fever IgG, Serum (11.19.17 @ 07:33)    [facility] Spotted Fever Antibody: Negative    [facility] Spotted Fever Antibody IgM: 0.21: Negative        <0.90                                   Equivocal 0.90 - 1.10                                   Positive        >1.10  Performed At: [address] [id number]  Vielka AMES MD Ph:[phone] index    Cytomegalovirus by PCR, CSF (11.18.17 @ 22:59)    CMV PCR Detection: see note: TESTING PERFORMED AT LOW VOLUME ON CSF SPECIMEN FOR CMV, MAY AFFECT  RESULTS.       Assay Range: 142  IU/mL to 1.88 x10e8  IU/mL       Expected Value:  Not Detected   The limit of quantitation (LOQ) is 142 IU /mL. CMV DNA detected below  the LOQ willbe reported    as Detected: <142  IU/mL    This test was developed and its performance characteristics determined  by [facility].  It has not been cleared or approved by the U.S Food and Drug  Administration.  Results should be used in conjunction with clinical findings, and should  not form the sole basis for a diagnosis or   treatment decision.  PCR tests are performed pursuant to a license agreement with [employer]      Test Performed by:       	[facility]       	[address] [place] [address] [phone]    Source CMV: CSF        MICROBIOLOGY:    Culture - Blood (11.19.17 @ 13:28)    Specimen Source: .Blood Blood    Culture Results:   No growth at 3 days.    Culture - Blood (11.19.17 @ 13:28)    Specimen Source: .Blood Blood-Venous    Culture Results:   No growth at 3 days.    Culture - Fungal, CSF (11.18.17 @ 00:36)    Specimen Source: .CSF CSF    Culture Results:   Testing in progress    Culture - CSF with Gram Stain . (11.17.17 @ 18:22)    Gram Stain:   Moderate WBC's  No organisms seen    Specimen Source: .CSF CSF    Culture Results:   No growth to date    Culture - CSF with Gram Stain . (11.15.17 @ 17:49)    Gram Stain:   Many White blood cells  No organisms seen    Specimen Source: .CSF CSF    Culture Results:   No growth to date      Molecular diagnostics pending at Dr Westbrook's lab and [facility]

## 2017-11-22 NOTE — PROGRESS NOTE ADULT - SUBJECTIVE AND OBJECTIVE BOX
SUBJECTIVE / INTERVAL HPI: Patient seen and examined at bedside.     VITAL SIGNS:  Vital Signs Last 24 Hrs  T(C): 37.7 (22 Nov 2017 04:47), Max: 39.3 (21 Nov 2017 15:13)  T(F): 99.8 (22 Nov 2017 04:47), Max: 102.8 (21 Nov 2017 15:13)  HR: 77 (22 Nov 2017 04:47) (77 - 99)  BP: 94/54 (22 Nov 2017 04:47) (94/54 - 127/63)  BP(mean): --  RR: 16 (22 Nov 2017 04:47) (16 - 18)  SpO2: 94% (22 Nov 2017 04:47) (94% - 100%)    PHYSICAL EXAM:    General: WDWN, resting in bed comfortably, NAD.  HEENT: NC/AT; PERRL, clear conjunctiva.   Neck: Improvement in stiffness. Can move neck laterally, still difficulty moving A/P but improved.  Respiratory: CTA b/l. No w/r/r.   Cardiovascular: +S1/S2; RRR. No m/r/g.  Abdomen: soft, NT/ND; +BS x4  Extremities: WWP, 2+ peripheral pulses b/l; no LE edema  Skin: normal color and turgor; no rash  Neurological: AAOx3. EOMI laterally, difficulty moving superiorly and inferiorly. photophobia present but improved. No other CN deficits. Strength/sensation 5/5 throughout. Hyperreflexia lower extremity bilaterally. Babinski negative. +Kernig. +Brudzinski.      MEDICATIONS:  MEDICATIONS  (STANDING):  cefTRIAXone   IVPB 2 Gram(s) IV Intermittent every 12 hours  dexamethasone  Injectable 6 milliGRAM(s) IV Push every 6 hours  dextrose 5%. 1000 milliLiter(s) (50 mL/Hr) IV Continuous <Continuous>  dextrose 50% Injectable 12.5 Gram(s) IV Push once  dextrose 50% Injectable 25 Gram(s) IV Push once  dextrose 50% Injectable 25 Gram(s) IV Push once  influenza   Vaccine 0.5 milliLiter(s) IntraMuscular once  insulin lispro (HumaLOG) corrective regimen sliding scale   SubCutaneous Before meals and at bedtime  Nesacaine (Chloroprocaine Hcl) 2% Inj. 10 milliLiter(s) 10 milliLiter(s) Local Injection once  pantoprazole    Tablet 40 milliGRAM(s) Oral before breakfast  polyethylene glycol 3350 17 Gram(s) Oral every 12 hours  senna 2 Tablet(s) Oral at bedtime  sodium chloride 0.9%. 1000 milliLiter(s) (125 mL/Hr) IV Continuous <Continuous>  voriconazole IVPB 250 milliGRAM(s) IV Intermittent every 12 hours    MEDICATIONS  (PRN):  acetaminophen   Tablet 650 milliGRAM(s) Oral every 6 hours PRN For Temp greater than 38 C (100.4 F)  acetaminophen   Tablet. 650 milliGRAM(s) Oral every 6 hours PRN Mild Pain (1 - 3)  dextrose Gel 1 Dose(s) Oral once PRN Blood Glucose LESS THAN 70 milliGRAM(s)/deciliter  diphenhydrAMINE   Capsule 25 milliGRAM(s) Oral at bedtime PRN Rash and/or Itching  glucagon  Injectable 1 milliGRAM(s) IntraMuscular once PRN Glucose LESS THAN 70 milligrams/deciliter  ondansetron Injectable 4 milliGRAM(s) IV Push every 6 hours PRN Nausea  oxyCODONE    IR 10 milliGRAM(s) Oral every 4 hours PRN Severe Pain (7 - 10)  oxyCODONE    IR 5 milliGRAM(s) Oral every 4 hours PRN Moderate Pain (4 - 6)      ALLERGIES:  Allergies    aspirin (Hives; Swelling)  ibuprofen (Rash; Flushing; Hives)  lidocaine (Short breath)    Intolerances        LABS:    11-21    136  |  101  |  13  ----------------------------<  155<H>  3.6   |  20<L>  |  0.72    Ca    7.8<L>      21 Nov 2017 04:07    TPro  6.2  /  Alb  3.1<L>  /  TBili  0.2  /  DBili  <0.2  /  AST  30  /  ALT  35  /  AlkPhos  39<L>  11-21        CAPILLARY BLOOD GLUCOSE      POCT Blood Glucose.: 146 mg/dL (21 Nov 2017 21:19)      RADIOLOGY & ADDITIONAL TESTS: Reviewed. SUBJECTIVE / INTERVAL HPI: Febrile O/N. Patient refused tylenol because instructed by Dr. Mack not to mask fevers. Continued to have hallucinations so Voriconazole was discontinued. Patient seen and examined at bedside. Reports improvement in her symptoms this morning. Reports a left sided occular headache but no vision changes. Denies F/C/N/V. Continues to report some photophobia and nuchal pain. ROS otherwise negative.    VITAL SIGNS:  Vital Signs Last 24 Hrs  T(C): 37.7 (22 Nov 2017 04:47), Max: 39.3 (21 Nov 2017 15:13)  T(F): 99.8 (22 Nov 2017 04:47), Max: 102.8 (21 Nov 2017 15:13)  HR: 77 (22 Nov 2017 04:47) (77 - 99)  BP: 94/54 (22 Nov 2017 04:47) (94/54 - 127/63)  BP(mean): --  RR: 16 (22 Nov 2017 04:47) (16 - 18)  SpO2: 94% (22 Nov 2017 04:47) (94% - 100%)    PHYSICAL EXAM:    General: WDWN, resting in bed comfortably, NAD.  HEENT: NC/AT; PERRL, clear conjunctiva.   Neck: Improvement in stiffness. Can move neck laterally, still difficulty moving A/P but improved.  Respiratory: CTA b/l. No w/r/r.   Cardiovascular: +S1/S2; RRR. No m/r/g.  Abdomen: soft, NT/ND; +BS x4  Extremities: WWP, 2+ peripheral pulses b/l; no LE edema  Skin: normal color and turgor; no rash  Neurological: AAOx3. EOMI laterally, difficulty moving superiorly and inferiorly. photophobia present but improved. No other CN deficits. Strength/sensation 5/5 throughout. Hyperreflexia lower extremity bilaterally. Babinski negative. Mildly +Kernig. Mildly +Brudzinski.      MEDICATIONS:  MEDICATIONS  (STANDING):  cefTRIAXone   IVPB 2 Gram(s) IV Intermittent every 12 hours  dexamethasone  Injectable 6 milliGRAM(s) IV Push every 6 hours  dextrose 5%. 1000 milliLiter(s) (50 mL/Hr) IV Continuous <Continuous>  dextrose 50% Injectable 12.5 Gram(s) IV Push once  dextrose 50% Injectable 25 Gram(s) IV Push once  dextrose 50% Injectable 25 Gram(s) IV Push once  influenza   Vaccine 0.5 milliLiter(s) IntraMuscular once  insulin lispro (HumaLOG) corrective regimen sliding scale   SubCutaneous Before meals and at bedtime  Nesacaine (Chloroprocaine Hcl) 2% Inj. 10 milliLiter(s) 10 milliLiter(s) Local Injection once  pantoprazole    Tablet 40 milliGRAM(s) Oral before breakfast  polyethylene glycol 3350 17 Gram(s) Oral every 12 hours  senna 2 Tablet(s) Oral at bedtime  sodium chloride 0.9%. 1000 milliLiter(s) (125 mL/Hr) IV Continuous <Continuous>  voriconazole IVPB 250 milliGRAM(s) IV Intermittent every 12 hours    MEDICATIONS  (PRN):  acetaminophen   Tablet 650 milliGRAM(s) Oral every 6 hours PRN For Temp greater than 38 C (100.4 F)  acetaminophen   Tablet. 650 milliGRAM(s) Oral every 6 hours PRN Mild Pain (1 - 3)  dextrose Gel 1 Dose(s) Oral once PRN Blood Glucose LESS THAN 70 milliGRAM(s)/deciliter  diphenhydrAMINE   Capsule 25 milliGRAM(s) Oral at bedtime PRN Rash and/or Itching  glucagon  Injectable 1 milliGRAM(s) IntraMuscular once PRN Glucose LESS THAN 70 milligrams/deciliter  ondansetron Injectable 4 milliGRAM(s) IV Push every 6 hours PRN Nausea  oxyCODONE    IR 10 milliGRAM(s) Oral every 4 hours PRN Severe Pain (7 - 10)  oxyCODONE    IR 5 milliGRAM(s) Oral every 4 hours PRN Moderate Pain (4 - 6)      ALLERGIES:  Allergies    aspirin (Hives; Swelling)  ibuprofen (Rash; Flushing; Hives)  lidocaine (Short breath)    Intolerances        LABS:    11-21    136  |  101  |  13  ----------------------------<  155<H>  3.6   |  20<L>  |  0.72    Ca    7.8<L>      21 Nov 2017 04:07    TPro  6.2  /  Alb  3.1<L>  /  TBili  0.2  /  DBili  <0.2  /  AST  30  /  ALT  35  /  AlkPhos  39<L>  11-21        CAPILLARY BLOOD GLUCOSE      POCT Blood Glucose.: 146 mg/dL (21 Nov 2017 21:19)      RADIOLOGY & ADDITIONAL TESTS: Reviewed.

## 2017-11-22 NOTE — PROGRESS NOTE ADULT - PROBLEM SELECTOR PLAN 3
F: no IVF  E: Replete PRN  N: Reg diet  PPX: No VTE ppx indicated, IMPROVE score 0; Droplet precautions    Full Code  DISPO: RMF, pending transfer to [place].

## 2017-11-22 NOTE — PROGRESS NOTE ADULT - ASSESSMENT
Meningitis of unclear cause but still presuming infectious  Fever improved - suspect IV Vanco was contributing  Unclear whether some clinical improvement due to Voriconazole - but such quick response is not usually expected  Hallucinations due to Vori    RECOMMEND  Continue IV Ceftriaxone   If 16S Ribosomal RNA negative, would d/c Ceftriaxone   Continue Voriconazole  Will convert to PO after having discussed the matter with the clinical pharmacist  Check voriconazole trough  Supportive care  Steroids - per Neurology

## 2017-11-22 NOTE — DISCHARGE NOTE ADULT - PLAN OF CARE
continued workup You had symptoms concerning for meningitis. You were treated at various times for bacterial, viral, and fungal causes. All antimicrobials were discontinued as continued negative results came back. You were also on a steroid for your meningitis which has been completely tapered off. Autoimmune workup has been sent out and is negative thus far. Your initial symptoms of neck stiffness and sensitivity to light have improved, but you still have fevers. Thus more imaging was done to find another source of infection. That imaging has not been read yet, but will be available to you. You are being transferred to [place] for further care.

## 2017-11-22 NOTE — DISCHARGE NOTE ADULT - CARE PROVIDER_API CALL
Torrey Lopez), Medicine  [address] [place] [address]  Phone: [phone]  Fax: [phone]    Fausto Miller), Infectious Disease; Internal Medicine  [address] [place] [address]  Phone: [phone]  Fax: [phone]

## 2017-11-22 NOTE — PROGRESS NOTE ADULT - ASSESSMENT
32F w/PMH lumbar disk herniation s/p epidural injection 8/17 and recent neg w/u for similar presentation at [facility] (11/6) and [facility] (11/14) presenting with worsening headache, photophobia, neck stiffness, myalgias, fever, weakness, bl shooting back pain, now with PMN predominance on LP but no identified organisms on GS and negative PCR, concerning for infectious meningitis, but cultures NGTD. PCR negative. Many CSF studies still pending. Negative so far. Gallium scan negative. Patient reports continued fevers and chills and hallucinations after initiation of voriconazole overnight.     It is possible that this patient does have a fungal meningitis as fungal cultures take a long time to grow, bacterial is less likely as the cultures have been negative. I believe that the patient has a viral meningitis which has been slow to resolve. The fevers do persist, but clinically the patient is getting better with improvement in her overall meningeal symptoms.    Patient and family are discussing and planning transfer to [place] for further care.

## 2017-11-22 NOTE — PROGRESS NOTE ADULT - SUBJECTIVE AND OBJECTIVE BOX
32[place] with PMH of lumbar disc herniation (s/p epidural injection on 8/17) who was at [facility] on 11/13 for severe HA partially relieved by lying down, neck pain, fever, weakness, myalgias, back pain radiating to bl thighs.     Pt reports first experiencing similar symptoms 10 days ago (went to [facility] in [place] - neg w/u: LP, lumbar MRI w/contr, head CT, West Nile virus - discharged w/supportive tx + fiorocet).     Pt then went to Dr Carty and was sent to ED.  At [facility], neg MRI brain, neg RVP, neg BCx, neg HIV, evaluated by ID and Neuro - discharged home and felt better until 11/14 in pm - worsening of headache, photophobia and neck stiffness. She denied any other symptoms.  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   Denied: recent travel, IV drug use, unprotected intercourse, any rashes or bites, invasive procedures other than epidural injection in 08/2017.  In the ER, pt s/p IV hydromorphone and IVF, w/partial relief of headache; neck stiffness, photophobia, chills. ROS otherwise negative. Requesting food.  VS in ED: T(F): 101.5  HR: 108  BP: 89/57 RR: 16 SpO2: 98%       Pt.S&E@BS in AM w/ Marion& Eugenie  	  MEDICATIONS:    voriconazole 250 milliGRAM(s) Oral every 12 hours      acetaminophen   Tablet 650 milliGRAM(s) Oral every 6 hours PRN  acetaminophen   Tablet. 650 milliGRAM(s) Oral every 6 hours PRN  diphenhydrAMINE   Capsule 25 milliGRAM(s) Oral at bedtime PRN  ondansetron Injectable 4 milliGRAM(s) IV Push every 6 hours PRN  oxyCODONE    IR 10 milliGRAM(s) Oral every 4 hours PRN  oxyCODONE    IR 5 milliGRAM(s) Oral every 4 hours PRN    pantoprazole    Tablet 40 milliGRAM(s) Oral before breakfast  polyethylene glycol 3350 17 Gram(s) Oral every 12 hours  senna 2 Tablet(s) Oral at bedtime    dexamethasone  Injectable 6 milliGRAM(s) IV Push every 6 hours  dextrose 50% Injectable 12.5 Gram(s) IV Push once  dextrose 50% Injectable 25 Gram(s) IV Push once  dextrose 50% Injectable 25 Gram(s) IV Push once  dextrose Gel 1 Dose(s) Oral once PRN  glucagon  Injectable 1 milliGRAM(s) IntraMuscular once PRN  insulin lispro (HumaLOG) corrective regimen sliding scale   SubCutaneous Before meals and at bedtime    dextrose 5%. 1000 milliLiter(s) IV Continuous <Continuous>  influenza   Vaccine 0.5 milliLiter(s) IntraMuscular once  sodium chloride 0.9%. 1000 milliLiter(s) IV Continuous <Continuous>      Complaint: Symptoms improving    Otherwise 12 point review of systems is normal.    PHYSICAL EXAM:    Constitutional: NAD  Eyes: PERRL, EOMI, sclera non-icteric  Neck: supple, no masses, no JVD  Respiratory: CTA b/l, good air entry b/l, no wheezing, rhonchi, rales, with normal respiratory effort and no intercostal retractions  Cardiovascular: RRR, normal S1S2, no M/R/G  Gastrointestinal: soft, NTND, no masses palpable, BS normal in all four quadrants,   Extremities:  no c/c/e  Neurological: AAOx3    ICU Vital Signs Last 24 Hrs  T(C): 37.2 (22 Nov 2017 16:18), Max: 38.1 (22 Nov 2017 00:00)  T(F): 98.9 (22 Nov 2017 16:18), Max: 100.5 (22 Nov 2017 00:00)  HR: 77 (22 Nov 2017 16:18) (77 - 84)  BP: 104/65 (22 Nov 2017 16:18) (92/59 - 104/65)  BP(mean): --  ABP: --  ABP(mean): --  RR: 17 (22 Nov 2017 16:18) (16 - 18)  SpO2: 98% (22 Nov 2017 16:18) (94% - 98%)      ECG:      CHEST X RAY    CT    MRI    MRA    CT ANGIO    CAROTID DUPLEX    DUPLEX     Echocardiogram    Catheterization:    Stress Test:     LABS:	 	  CARDIAC MARKERS:          11-22    135  |  97  |  11  ----------------------------<  139<H>  4.2   |  22  |  0.53    Ca    8.4      22 Nov 2017 06:47    TPro  6.0  /  Alb  3.4  /  TBili  0.3  /  DBili  x   /  AST  15  /  ALT  48<H>  /  AlkPhos  38<L>  11-22          ASSESSMENT/PLAN: 	  32F w/ pmhx lumbar disk herniation (8/17) and diagnosis of viral meningitis at [facility] (11/6) w/ recent admission for post-LP HA (11/14) who presented on 11/15 due to worsening of fevers, headache and meningeal signs with repeat LP in the emergency room showing 1700+ nucleated cells with a neutrophil predominance. Opening pressure not noted. Glucose 55. Protein 111. . CSF PCR negative. CSF Gram stain – numerous whites, no organisms. She was started on Vancomycin, Ceftriaxone and Acyclovir, given IVF, Tylenol for fever and 1 dose of 10mg decadron. Her pain was managed with initially with Hydromorphone and Morphine, later with Tylenol, Oxycodone and Hydromorphone for breakthrough and she was given Zofran for nausea. Acyclovir was discontinued on 11/17 given the negative PCR. Her symptoms were not improving and she continued to spike fevers and she was started on Decadron 6mg q6hr. SSI started at this time. Her symptoms improved temporarily, but she continued to spike fevers and the improvement was temporary. Vancomycin was discontinued on 11/20 and patient received a Demerol injection and was started on Voriconazole IV and transitioned now to PO as patient was having hallucinations.    MR Head on 11/16 was negative for any pathology. MR Lumbar spine showed mild meningeal enhancement w/ diffuse enhancement of the cauda equina nerve roots which may be due to infection or recent instrumentation. Diffuse disc bulge at L4-L5 with posterior annular tear and L5-S1 mild diffuse disc bulge and small shallow right paracentral disc protrusion contacting the right descending s1 nerve root. MR thoracic spine showed abnormal dural enhancement concerning for inflammatory/infections process. MR cervical spine was unremarkable. Repeat LP on 11/17 showed Glucose 63, Protein 42, LDH 34, 74 nucleated cells w/ lymphocytic predominance, opening pressure 16. Blood cultures – No growth. CSF Cx – NGTD. Lactate 3.6 on 11/16 resolved with IVF (1.7 o0n 11/18). Procalcitonin <0.05. ESR 32. CRP 0.10. UA negative. UCx negative. EKG NSR. CXR Clear lungs. Whole body gallium scan negative.

## 2017-11-22 NOTE — DISCHARGE NOTE ADULT - MEDICATION SUMMARY - MEDICATIONS TO TAKE
I will START or STAY ON the medications listed below when I get home from the hospital:    butalbital/acetaminophen/caffeine 50 mg-325 mg-40 mg oral tablet  -- 1 tab(s) by mouth every 4 hours, As needed, headache  -- Indication: For Headaches    methocarbamol 500 mg oral tablet  -- 1 tab(s) by mouth every 8 hours, As needed, Muscle stiffness/spasms  -- Indication: For neck stiffness    pantoprazole 40 mg oral delayed release tablet  -- 1 tab(s) by mouth once a day (before a meal)  -- Indication: For acid reflux

## 2017-11-22 NOTE — DISCHARGE NOTE ADULT - PATIENT PORTAL LINK FT
“You can access the [employer] Patient Portal, offered by [facility], by registering with the following website: [email or web address]”

## 2017-11-22 NOTE — PROGRESS NOTE ADULT - SUBJECTIVE AND OBJECTIVE BOX
Addendum to my Progress Note from today    No full Rickettsia serology panel performed outside of [facility]  I requested the full panel to be run

## 2017-11-22 NOTE — PROGRESS NOTE ADULT - SUBJECTIVE AND OBJECTIVE BOX
Neurology Follow up note    Name  JIM WILKINS    HPI:  32F [place] with PMH of lumbar disc herniation (s/p epidural injection on 8/17) who was at [facility] on 11/13 for severe HA partially relieved by lying down, neck pain, fever, weakness, myalgias, back pain radiating to bl thighs.     Pt reports first experiencing similar symptoms 10 days ago (went to [facility] in [place] - neg w/u: LP, lumbar MRI w/contr, head CT, West Nile virus - discharged w/supportive tx + fiorocet).     Pt then went to Dr Carty and was sent to ED.  At [facility], neg MRI brain, neg RVP, neg BCx, neg HIV, evaluated by ID and Neuro - discharged home and felt better until 11/14 in pm - worsening of headache, photophobia and neck stiffness. She denied any other symptoms.  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   Denied: recent travel, IV drug use, unprotected intercourse, any rashes or bites, invasive procedures other than epidural injection in 08/2017.  In the ER, pt s/p IV hydromorphone and IVF, w/partial relief of headache; neck stiffness, photophobia, chills. ROS otherwise negative. Requesting food.  VS in ED: T(F): 101.5  HR: 108  BP: 89/57 RR: 16 SpO2: 98% (15 Nov 2017 19:50)      Interval History - no headache- mild eye pressure- [facility]        REVIEW OF SYSTEMS    Vital Signs Last 24 Hrs  T(C): 37.7 (22 Nov 2017 04:47), Max: 39.3 (21 Nov 2017 15:13)  T(F): 99.8 (22 Nov 2017 04:47), Max: 102.8 (21 Nov 2017 15:13)  HR: 77 (22 Nov 2017 04:47) (77 - 99)  BP: 94/54 (22 Nov 2017 04:47) (94/54 - 127/63)  BP(mean): --  RR: 16 (22 Nov 2017 04:47) (16 - 18)  SpO2: 94% (22 Nov 2017 04:47) (94% - 100%)    Physical Exam-     Mental Status- awake and alert    Cranial Nerves-nl    Gait and station-no foot drop    Motor-moves all e extremities    Reflexes-intact    Sensation-    Coordination-no tremors    Vascular -    Medications  acetaminophen   Tablet 650 milliGRAM(s) Oral every 6 hours PRN  acetaminophen   Tablet. 650 milliGRAM(s) Oral every 6 hours PRN  cefTRIAXone   IVPB 2 Gram(s) IV Intermittent every 12 hours  dexamethasone  Injectable 6 milliGRAM(s) IV Push every 6 hours  dextrose 5%. 1000 milliLiter(s) IV Continuous <Continuous>  dextrose 50% Injectable 12.5 Gram(s) IV Push once  dextrose 50% Injectable 25 Gram(s) IV Push once  dextrose 50% Injectable 25 Gram(s) IV Push once  dextrose Gel 1 Dose(s) Oral once PRN  diphenhydrAMINE   Capsule 25 milliGRAM(s) Oral at bedtime PRN  glucagon  Injectable 1 milliGRAM(s) IntraMuscular once PRN  influenza   Vaccine 0.5 milliLiter(s) IntraMuscular once  insulin lispro (HumaLOG) corrective regimen sliding scale   SubCutaneous Before meals and at bedtime  Nesacaine (Chloroprocaine Hcl) 2% Inj. 10 milliLiter(s) 10 milliLiter(s) Local Injection once  ondansetron Injectable 4 milliGRAM(s) IV Push every 6 hours PRN  oxyCODONE    IR 10 milliGRAM(s) Oral every 4 hours PRN  oxyCODONE    IR 5 milliGRAM(s) Oral every 4 hours PRN  pantoprazole    Tablet 40 milliGRAM(s) Oral before breakfast  polyethylene glycol 3350 17 Gram(s) Oral every 12 hours  senna 2 Tablet(s) Oral at bedtime  sodium chloride 0.9%. 1000 milliLiter(s) IV Continuous <Continuous>  voriconazole IVPB 250 milliGRAM(s) IV Intermittent every 12 hours      Lab      Radiology    Assessment- Etiology of fevers remain undetermined other than meninigitis    Plan- await cultures- reviewed with Dr Floyd

## 2017-11-22 NOTE — PROGRESS NOTE ADULT - PROBLEM SELECTOR PLAN 1
Pt with previously negative workup, now with PMN predominance on CSF and s/s of meningitis. Pt w/Tmax 103.5 and LP s/f neutrophilic predominance, suggestive of bacterial etiology; however, CSF PCR panel, CSF bacterial cultures and gram stain here and from [facility] are neg.  - c/w ceftriaxone 2g IV q12h, Voriconazole discontinued overnight.  - c/w decadron 6mg q6hr. Consider taper as patient has been improving.  - Vancomycin discontinued per ID.  - d/c'd acyclovir 600mg IV q8h per Dr. Miller  - Service ID consulted as pt's mother would like second opinion; appreciate recs  - Dr. Osborne following for pain management, appreciate recs.  - Dr. Miller following and coordinating outside lab studies.  - BCx and CSF bacteria cultures NGTD

## 2017-11-22 NOTE — PROGRESS NOTE ADULT - ASSESSMENT
The patient feels Somewhat better.  The patient's spinal fluid is undergoing molecular analysis for the presence of unusual pathogens.

## 2017-11-22 NOTE — PROGRESS NOTE ADULT - SUBJECTIVE AND OBJECTIVE BOX
The patient is subjectively better and her fever appears to have decreased without antipyretics.      Physical exam reveals a well-developed and well-nourished woman in minimal pain line in a bright room with her mother in attendance (her mother is a neurosurgical trauma nurse).  Her vital signs are within normal limits.  The patient is remarkably less photophobic.  Examination of her head, ears, eyes, nose, throat is unremarkable.  There is no lymphadenopathy, jugular venous distention or thyroidomegaly in the neck.  The lungs are clear to percussion and auscultation.  There is no axillary or breast pathology bilaterally.  The heart sounds are regular with no auscultatory evidence for murmur, rub,, gallop or ectopy.  The patient has no truncal obesity and there is no palpable or percussible hepatomegaly or splenomegaly.  The patient has no chronic venous stasis changes in the lower extremities.  Neurologically the patient has hypoesthesia in an L12 L3 distribution with positive straight leg raises.  The patient has nuchal rigidity. These meningeal signs however are much better than they were when last evaluated by me on Yesterday or the day before.  Yet, they are still present.

## 2017-11-22 NOTE — DISCHARGE NOTE ADULT - HOSPITAL COURSE
32F w/ pmhx lumbar disk herniation (8/17) and diagnosis of viral meningitis at [facility] (11/6) w/ recent admission for post-LP HA (11/14) who presented on 11/15 due to worsening of fevers, headache and meningeal signs with repeat LP in the emergency room showing 1700+ nucleated cells with a neutrophil predominance. Opening pressure not noted. Glucose 55. Protein 111. . CSF PCR negative. CSF Gram stain – numerous whites, no organisms. She was started on Vancomycin, Ceftriaxone and Acyclovir, given IVF, Tylenol for fever and 1 dose of 10mg decadron. Her pain was managed with initially with Hydromorphone and Morphine, later with Tylenol, Oxycodone and Hydromorphone for breakthrough and she was given Zofran for nausea. Acyclovir was discontinued on 11/17 given the negative PCR. Her symptoms were not improving and she continued to spike fevers and she was started on Decadron 6mg q6hr. SSI started at this time. Her symptoms improved temporarily, but she continued to spike fevers and the improvement was temporary. Vancomycin was discontinued on 11/20 and patient received a Demerol injection and was started on Voriconazole IV and transitioned now to PO as patient was having hallucinations..    MR Head on 11/16 was negative for any pathology. MR Lumbar spine showed mild meningeal enhancement w/ diffuse enhancement of the cauda equina nerve roots which may be due to infection or recent instrumentation. Diffuse disc bulge at L4-L5 with posterior annular tear and L5-S1 mild diffuse disc bulge and small shallow right paracentral disc protrusion contacting the right descending s1 nerve root. MR thoracic spine showed abnormal dural enhancement concerning for inflammatory/infections process. MR cervical spine was unremarkable. Repeat LP on 11/17 showed Glucose 63, Protein 42, LDH 34, 74 nucleated cells w/ lymphocytic predominance, opening pressure 16. Blood cultures – No growth. CSF Cx – NGTD. Lactate 3.6 on 11/16 resolved with IVF (1.7 o0n 11/18). Procalcitonin <0.05. ESR 32. CRP 0.10. UA negative. UCx negative. EKG NSR. CXR Clear lungs. Whole body gallium scan negative. 32F w/ pmhx lumbar disk herniation (8/17) and diagnosis of viral meningitis at [facility] (11/6) w/ recent admission for post-LP HA (11/14) who presented on 11/15 due to worsening of fevers, headache and meningeal signs with repeat LP in the emergency room showing 1700+ nucleated cells with a neutrophil predominance. Opening pressure not noted. Glucose 55. Protein 111. . CSF PCR negative. CSF Gram stain – numerous whites, no organisms. She was started on Vancomycin, Ceftriaxone and Acyclovir, given IVF, Tylenol for fever and 1 dose of 10mg decadron. Her pain was managed with initially with Hydromorphone and Morphine, later with Tylenol, Oxycodone and Hydromorphone for breakthrough and she was given Zofran for nausea. Acyclovir was discontinued on 11/17 given the negative PCR. Her symptoms were not improving and she continued to spike fevers and she was started on Decadron 6mg q6hr. SSI started at this time. Her symptoms improved temporarily, but she continued to spike fevers and the improvement was temporary. Vancomycin was discontinued on 11/20 and patient received a Demerol injection and was started on Voriconazole IV and transitioned now to PO as patient was having hallucinations.    MR Head on 11/16 was negative for any pathology. MR Lumbar spine showed mild meningeal enhancement w/ diffuse enhancement of the cauda equina nerve roots which may be due to infection or recent instrumentation. Diffuse disc bulge at L4-L5 with posterior annular tear and L5-S1 mild diffuse disc bulge and small shallow right paracentral disc protrusion contacting the right descending s1 nerve root. MR thoracic spine showed abnormal dural enhancement concerning for inflammatory/infections process. MR cervical spine was unremarkable. Repeat LP on 11/17 showed Glucose 63, Protein 42, LDH 34, 74 nucleated cells w/ lymphocytic predominance, opening pressure 16. Blood cultures – No growth. CSF Cx – NGTD. Lactate 3.6 on 11/16 resolved with IVF (1.7 o0n 11/18). Procalcitonin <0.05. ESR 32. CRP 0.10. UA negative. UCx negative. EKG NSR. CXR Clear lungs. Whole body gallium scan negative.    CSF Studies resulted – Lyme, West Nile, Mycoplasma negative. CSF and Serum Studies pending - ACE, HE, anti-RNA, Brucella, dsDNA, Immunofication, IgG4, Ig panel, Lactate, Lupus, RF, Vitamin D, Bartonella, Viral culture, Leptospirosis, RMSF IgG, West Nile PCR, CMV PCR CSF, Mycoplasma IgG-IgM, Borrelia, Fungitell, Lyme 32F w/ pmhx lumbar disk herniation (8/17) and diagnosis of viral meningitis at [facility] (11/6) w/ recent admission for post-LP HA (11/14) who presented on 11/15 due to worsening of fevers, headache and meningeal signs with repeat LP in the emergency room showing 1700+ nucleated cells with a neutrophil predominance. Opening pressure not noted. Glucose 55. Protein 111. . CSF PCR negative. CSF Gram stain – numerous whites, no organisms. She was started on Vancomycin, Ceftriaxone and Acyclovir, given IVF, Tylenol for fever and 1 dose of 10mg decadron. Her pain was managed with initially with Hydromorphone and Morphine, later with Tylenol, Oxycodone and Hydromorphone for breakthrough and she was given Zofran for nausea. Acyclovir was discontinued on 11/17 given the negative PCR. Her symptoms were not improving and she continued to spike fevers and she was started on Decadron 6mg q6hr. SSI started at this time. Her symptoms improved temporarily, but she continued to spike fevers and the improvement was temporary. Vancomycin was discontinued on 11/20 and patient received a Demerol injection and was started on Voriconazole. Fungal cultures were negative and the voriconazole was discontinued. Decadron tapered.     MR Head on 11/16 was negative for any pathology. MR Lumbar spine showed mild meningeal enhancement w/ diffuse enhancement of the cauda equina nerve roots which may be due to infection or recent instrumentation. Diffuse disc bulge at L4-L5 with posterior annular tear and L5-S1 mild diffuse disc bulge and small shallow right paracentral disc protrusion contacting the right descending s1 nerve root. MR thoracic spine showed abnormal dural enhancement concerning for inflammatory/infections process. MR cervical spine was unremarkable. Repeat LP on 11/17 showed Glucose 63, Protein 42, LDH 34, 74 nucleated cells w/ lymphocytic predominance, opening pressure 16. Blood cultures – No growth. CSF Cx – NGTD. Lactate 3.6 on 11/16 resolved with IVF (1.7 o0n 11/18). Procalcitonin <0.05. ESR 32. CRP 0.10. UA negative. UCx negative. EKG NSR. CXR Clear lungs. Whole body gallium scan negative.    All Viral, Bacterial and Fungal studies negative. Outside lab tests for bacterial and fungal components also negative.     Patient continues to spike fevers, but noted to be using heated blanket on upper chest and neck frequently. Denies chills and says it is being used for comfort. Oral temp > rectal temp at that time. Patient was asked to discontinue use as it can effect temperature readings.    Patient requested transfer to [place] which is approved pending bed availability. 32F w/ pmhx lumbar disk herniation (8/17) and diagnosis of viral meningitis at [facility] (11/6) w/ recent admission for post-LP HA (11/14) who presented on 11/15 due to worsening of fevers, headache and meningeal signs with repeat LP in the emergency room showing 1700+ nucleated cells with a neutrophil predominance. Opening pressure not noted. Glucose 55. Protein 111. . CSF PCR negative. CSF Gram stain – numerous whites, no organisms. She was initially started on Vancomycin, Ceftriaxone and Acyclovir, given IVF, Tylenol for fever and 1 dose of 10mg decadron. Her pain was managed with initially with Hydromorphone and Morphine, later with Tylenol, Oxycodone and Hydromorphone for breakthrough and she was given Zofran for nausea. Acyclovir was discontinued (11/15-11/17) given the negative PCR. Her symptoms were not improving and she continued to spike fevers and she was started on Decadron 6mg q6hr. SSI started at this time. Her symptoms improved temporarily, but she continued to spike fevers and the improvement was temporary. Vancomycin (11/15-11/21) and CTX (11/15-11/22) were discontinued and patient received a Demerol injection and was started on Voriconazole for empiric fungal coverage at pt continued to spike fevers. Fungal cultures were negative and the voriconazole (11/20-11/28) was discontinued. Decadron tapered, now completely off.    MR Head on 11/16 was negative for any pathology. MR Lumbar spine showed mild meningeal enhancement w/ diffuse enhancement of the cauda equina nerve roots which may be due to infection or recent instrumentation. Diffuse disc bulge at L4-L5 with posterior annular tear and L5-S1 mild diffuse disc bulge and small shallow right paracentral disc protrusion contacting the right descending s1 nerve root. MR thoracic spine showed abnormal dural enhancement concerning for inflammatory/infections process. MR cervical spine was unremarkable. Repeat LP on 11/17 showed Glucose 63, Protein 42, LDH 34, 74 nucleated cells w/ lymphocytic predominance, opening pressure 16. Blood cultures – No growth. CSF Cx – NGTD. Lactate 3.6 on 11/16 resolved with IVF (1.7 o0n 11/18). Procalcitonin <0.05. ESR 32. CRP 0.10. UA negative. UCx negative. EKG NSR. CXR Clear lungs. Whole body gallium scan negative.    All Viral, Bacterial and Fungal studies negative. Outside lab tests for bacterial and fungal components also negative. Viral Cx pending in Dr. Westbrook's lab in association with [facility].    Patient continues to spike fevers, but noted to be using heated blanket on upper chest and neck frequently. Denies chills and says it is being used for comfort. Oral temp > rectal temp at that time. Patient was asked to discontinue use as it can effect temperature readings. During last day of admission temperature curve rising again on rectal up to 101.7F. CT A/P and PET scan done pending results    Other studies thus far: Parvovirus IgG pos (IgM neg), EBV IgG pos (IgM neg), Mycoplasma IgG pos (IgM neg); Lyme, West Nile, VDRL, Brucella, Bartonella, [place], Leptospirosis, Fungitell, Viral culture, CMV neg    Pt being transferred to [facility] for further w/u. HD stable for tranfer

## 2017-11-22 NOTE — DISCHARGE NOTE ADULT - CARE PLAN
Principal Discharge DX:	Meningitis Principal Discharge DX:	Meningitis  Goal:	continued workup  Instructions for follow-up, activity and diet:	You had symptoms concerning for meningitis. You were treated at various times for bacterial, viral, and fungal causes. All antimicrobials were discontinued as continued negative results came back. You were also on a steroid for your meningitis which has been completely tapered off. Autoimmune workup has been sent out and is negative thus far. Your initial symptoms of neck stiffness and sensitivity to light have improved, but you still have fevers. Thus more imaging was done to find another source of infection. That imaging has not been read yet, but will be available to you. You are being transferred to [place] for further care.

## 2017-11-23 LAB
ALBUMIN SERPL ELPH-MCNC: 3.9 G/DL — SIGNIFICANT CHANGE UP (ref 3.3–5)
ALP SERPL-CCNC: 41 U/L — SIGNIFICANT CHANGE UP (ref 40–120)
ALT FLD-CCNC: 42 U/L — SIGNIFICANT CHANGE UP (ref 10–45)
ANA TITR SER: NEGATIVE — SIGNIFICANT CHANGE UP
ANION GAP SERPL CALC-SCNC: 16 MMOL/L — SIGNIFICANT CHANGE UP (ref 5–17)
ANTI-RIBONUCLEAR PROTEIN: 2.9 AI — HIGH
AST SERPL-CCNC: 17 U/L — SIGNIFICANT CHANGE UP (ref 10–40)
BILIRUB DIRECT SERPL-MCNC: <0.2 MG/DL — SIGNIFICANT CHANGE UP (ref 0–0.2)
BILIRUB INDIRECT FLD-MCNC: >0.2 MG/DL — SIGNIFICANT CHANGE UP (ref 0.2–1)
BILIRUB SERPL-MCNC: 0.4 MG/DL — SIGNIFICANT CHANGE UP (ref 0.2–1.2)
BUN SERPL-MCNC: 16 MG/DL — SIGNIFICANT CHANGE UP (ref 7–23)
CALCIUM SERPL-MCNC: 8.7 MG/DL — SIGNIFICANT CHANGE UP (ref 8.4–10.5)
CHLORIDE SERPL-SCNC: 97 MMOL/L — SIGNIFICANT CHANGE UP (ref 96–108)
CO2 SERPL-SCNC: 22 MMOL/L — SIGNIFICANT CHANGE UP (ref 22–31)
CREAT SERPL-MCNC: 0.58 MG/DL — SIGNIFICANT CHANGE UP (ref 0.5–1.3)
DSDNA AB SER-ACNC: <12 IU/ML — SIGNIFICANT CHANGE UP
GLUCOSE BLDC GLUCOMTR-MCNC: 118 MG/DL — HIGH (ref 70–99)
GLUCOSE BLDC GLUCOMTR-MCNC: 160 MG/DL — HIGH (ref 70–99)
GLUCOSE BLDC GLUCOMTR-MCNC: 178 MG/DL — HIGH (ref 70–99)
GLUCOSE BLDC GLUCOMTR-MCNC: 243 MG/DL — HIGH (ref 70–99)
GLUCOSE SERPL-MCNC: 128 MG/DL — HIGH (ref 70–99)
POTASSIUM SERPL-MCNC: 4.1 MMOL/L — SIGNIFICANT CHANGE UP (ref 3.5–5.3)
POTASSIUM SERPL-SCNC: 4.1 MMOL/L — SIGNIFICANT CHANGE UP (ref 3.5–5.3)
PROT SERPL-MCNC: 6.7 G/DL — SIGNIFICANT CHANGE UP (ref 6–8.3)
SODIUM SERPL-SCNC: 135 MMOL/L — SIGNIFICANT CHANGE UP (ref 135–145)

## 2017-11-23 RX ORDER — DEXAMETHASONE 0.5 MG/5ML
6 ELIXIR ORAL EVERY 6 HOURS
Qty: 0 | Refills: 0 | Status: DISCONTINUED | OUTPATIENT
Start: 2017-11-23 | End: 2017-11-24

## 2017-11-23 RX ORDER — ONDANSETRON 8 MG/1
4 TABLET, FILM COATED ORAL EVERY 8 HOURS
Qty: 0 | Refills: 0 | Status: DISCONTINUED | OUTPATIENT
Start: 2017-11-23 | End: 2017-11-29

## 2017-11-23 RX ADMIN — Medication 650 MILLIGRAM(S): at 17:18

## 2017-11-23 RX ADMIN — Medication 6 MILLIGRAM(S): at 02:50

## 2017-11-23 RX ADMIN — Medication 2: at 17:18

## 2017-11-23 RX ADMIN — Medication 6 MILLIGRAM(S): at 14:29

## 2017-11-23 RX ADMIN — SENNA PLUS 2 TABLET(S): 8.6 TABLET ORAL at 21:05

## 2017-11-23 RX ADMIN — Medication 325 MILLIGRAM(S): at 06:45

## 2017-11-23 RX ADMIN — OXYCODONE HYDROCHLORIDE 5 MILLIGRAM(S): 5 TABLET ORAL at 21:05

## 2017-11-23 RX ADMIN — VORICONAZOLE 250 MILLIGRAM(S): 10 INJECTION, POWDER, LYOPHILIZED, FOR SOLUTION INTRAVENOUS at 12:18

## 2017-11-23 RX ADMIN — SODIUM CHLORIDE 125 MILLILITER(S): 9 INJECTION INTRAMUSCULAR; INTRAVENOUS; SUBCUTANEOUS at 06:46

## 2017-11-23 RX ADMIN — Medication 2: at 12:45

## 2017-11-23 RX ADMIN — Medication 4: at 21:05

## 2017-11-23 RX ADMIN — Medication 6 MILLIGRAM(S): at 20:00

## 2017-11-23 RX ADMIN — POLYETHYLENE GLYCOL 3350 17 GRAM(S): 17 POWDER, FOR SOLUTION ORAL at 06:43

## 2017-11-23 RX ADMIN — Medication 6 MILLIGRAM(S): at 06:46

## 2017-11-23 RX ADMIN — PANTOPRAZOLE SODIUM 40 MILLIGRAM(S): 20 TABLET, DELAYED RELEASE ORAL at 06:45

## 2017-11-23 RX ADMIN — OXYCODONE HYDROCHLORIDE 5 MILLIGRAM(S): 5 TABLET ORAL at 22:05

## 2017-11-23 NOTE — PROGRESS NOTE ADULT - SUBJECTIVE AND OBJECTIVE BOX
SUBJECTIVE / INTERVAL HPI: Patient seen and examined at bedside.     VITAL SIGNS:  Vital Signs Last 24 Hrs  T(C): 37.8 (23 Nov 2017 05:26), Max: 38.7 (22 Nov 2017 22:02)  T(F): 100.1 (23 Nov 2017 05:26), Max: 101.6 (22 Nov 2017 22:02)  HR: 79 (23 Nov 2017 05:26) (73 - 79)  BP: 96/55 (23 Nov 2017 05:26) (96/55 - 104/65)  BP(mean): --  RR: 18 (23 Nov 2017 05:26) (17 - 18)  SpO2: 97% (23 Nov 2017 05:26) (96% - 98%)    PHYSICAL EXAM:    General: WDWN  HEENT: NC/AT; PERRL, clear conjunctiva  Neck: supple  Cardiovascular: +S1/S2; RRR  Respiratory: CTA b/l; no W/R/R  Gastrointestinal: soft, NT/ND; +BSx4  Extremities: WWP; 2+ peripheral pulses; no edema   Neurological: AAOx3; no focal deficits    MEDICATIONS:  MEDICATIONS  (STANDING):  dexamethasone  Injectable 6 milliGRAM(s) IV Push every 6 hours  dextrose 5%. 1000 milliLiter(s) (50 mL/Hr) IV Continuous <Continuous>  dextrose 50% Injectable 12.5 Gram(s) IV Push once  dextrose 50% Injectable 25 Gram(s) IV Push once  dextrose 50% Injectable 25 Gram(s) IV Push once  influenza   Vaccine 0.5 milliLiter(s) IntraMuscular once  insulin lispro (HumaLOG) corrective regimen sliding scale   SubCutaneous Before meals and at bedtime  pantoprazole    Tablet 40 milliGRAM(s) Oral before breakfast  polyethylene glycol 3350 17 Gram(s) Oral every 12 hours  senna 2 Tablet(s) Oral at bedtime  sodium chloride 0.9%. 1000 milliLiter(s) (125 mL/Hr) IV Continuous <Continuous>  voriconazole 250 milliGRAM(s) Oral every 12 hours    MEDICATIONS  (PRN):  acetaminophen   Tablet 650 milliGRAM(s) Oral every 6 hours PRN For Temp greater than 38 C (100.4 F)  acetaminophen   Tablet. 650 milliGRAM(s) Oral every 6 hours PRN Mild Pain (1 - 3)  dextrose Gel 1 Dose(s) Oral once PRN Blood Glucose LESS THAN 70 milliGRAM(s)/deciliter  diphenhydrAMINE   Capsule 25 milliGRAM(s) Oral at bedtime PRN Rash and/or Itching  glucagon  Injectable 1 milliGRAM(s) IntraMuscular once PRN Glucose LESS THAN 70 milligrams/deciliter  ondansetron Injectable 4 milliGRAM(s) IV Push every 6 hours PRN Nausea  oxyCODONE    IR 10 milliGRAM(s) Oral every 4 hours PRN Severe Pain (7 - 10)  oxyCODONE    IR 5 milliGRAM(s) Oral every 4 hours PRN Moderate Pain (4 - 6)      ALLERGIES:  Allergies    aspirin (Hives; Swelling)  ibuprofen (Rash; Flushing; Hives)  lidocaine (Short breath)    Intolerances        LABS:    11-23    135  |  97  |  16  ----------------------------<  128<H>  4.1   |  22  |  0.58    Ca    8.7      23 Nov 2017 08:26    TPro  6.0  /  Alb  3.4  /  TBili  0.3  /  DBili  x   /  AST  15  /  ALT  48<H>  /  AlkPhos  38<L>  11-22        CAPILLARY BLOOD GLUCOSE      POCT Blood Glucose.: 118 mg/dL (23 Nov 2017 07:42)      RADIOLOGY & ADDITIONAL TESTS: Reviewed. SUBJECTIVE / INTERVAL HPI: Febrile O/N and this morning, but downtrending. No other major overnight events. Patient seen and examined at bedside. Doing well this morning. Still reports some photophobia but neck stiffness continues to improve. Denies F/C/N/V/CP/SOB.  ROS otherwise negative.    VITAL SIGNS:  Vital Signs Last 24 Hrs  T(C): 37.8 (23 Nov 2017 05:26), Max: 38.7 (22 Nov 2017 22:02)  T(F): 100.1 (23 Nov 2017 05:26), Max: 101.6 (22 Nov 2017 22:02)  HR: 79 (23 Nov 2017 05:26) (73 - 79)  BP: 96/55 (23 Nov 2017 05:26) (96/55 - 104/65)  BP(mean): --  RR: 18 (23 Nov 2017 05:26) (17 - 18)  SpO2: 97% (23 Nov 2017 05:26) (96% - 98%)    PHYSICAL EXAM:    General: WDWN, resting in bed comfortably, NAD.  HEENT: NC/AT; PERRL, clear conjunctiva.   Neck: Minimal neck stiffness.  Respiratory: CTA b/l. No w/r/r.   Cardiovascular: +S1/S2; RRR. No m/r/g.  Abdomen: soft, NT/ND; +BS x4  Extremities: WWP, 2+ peripheral pulses b/l; no LE edema  Skin: normal color and turgor; no rash  Neurological: AAOx3. EOMI. photophobia present but improved. No other CN deficits. Strength/sensation 5/5 throughout. Hyperreflexia lower extremity bilaterally. Babinski negative. Mildly +Kernig. Mildly +Brudzinski.      MEDICATIONS:  MEDICATIONS  (STANDING):  dexamethasone  Injectable 6 milliGRAM(s) IV Push every 6 hours  dextrose 5%. 1000 milliLiter(s) (50 mL/Hr) IV Continuous <Continuous>  dextrose 50% Injectable 12.5 Gram(s) IV Push once  dextrose 50% Injectable 25 Gram(s) IV Push once  dextrose 50% Injectable 25 Gram(s) IV Push once  influenza   Vaccine 0.5 milliLiter(s) IntraMuscular once  insulin lispro (HumaLOG) corrective regimen sliding scale   SubCutaneous Before meals and at bedtime  pantoprazole    Tablet 40 milliGRAM(s) Oral before breakfast  polyethylene glycol 3350 17 Gram(s) Oral every 12 hours  senna 2 Tablet(s) Oral at bedtime  sodium chloride 0.9%. 1000 milliLiter(s) (125 mL/Hr) IV Continuous <Continuous>  voriconazole 250 milliGRAM(s) Oral every 12 hours    MEDICATIONS  (PRN):  acetaminophen   Tablet 650 milliGRAM(s) Oral every 6 hours PRN For Temp greater than 38 C (100.4 F)  acetaminophen   Tablet. 650 milliGRAM(s) Oral every 6 hours PRN Mild Pain (1 - 3)  dextrose Gel 1 Dose(s) Oral once PRN Blood Glucose LESS THAN 70 milliGRAM(s)/deciliter  diphenhydrAMINE   Capsule 25 milliGRAM(s) Oral at bedtime PRN Rash and/or Itching  glucagon  Injectable 1 milliGRAM(s) IntraMuscular once PRN Glucose LESS THAN 70 milligrams/deciliter  ondansetron Injectable 4 milliGRAM(s) IV Push every 6 hours PRN Nausea  oxyCODONE    IR 10 milliGRAM(s) Oral every 4 hours PRN Severe Pain (7 - 10)  oxyCODONE    IR 5 milliGRAM(s) Oral every 4 hours PRN Moderate Pain (4 - 6)      ALLERGIES:  Allergies    aspirin (Hives; Swelling)  ibuprofen (Rash; Flushing; Hives)  lidocaine (Short breath)    Intolerances        LABS:    11-23    135  |  97  |  16  ----------------------------<  128<H>  4.1   |  22  |  0.58    Ca    8.7      23 Nov 2017 08:26    TPro  6.0  /  Alb  3.4  /  TBili  0.3  /  DBili  x   /  AST  15  /  ALT  48<H>  /  AlkPhos  38<L>  11-22        CAPILLARY BLOOD GLUCOSE      POCT Blood Glucose.: 118 mg/dL (23 Nov 2017 07:42)      RADIOLOGY & ADDITIONAL TESTS: Reviewed.

## 2017-11-23 NOTE — PROGRESS NOTE ADULT - ASSESSMENT
Meningitis of unclear etiology  Presumed infectious  No apparent bacterial infection and Ceftriaxone was discontinued  in DDX: viral or fungal infection  Clinically improving    RECOMMEND  Continue Voriconazole  Check Voriconazole trough/level  Check CBC  Steroids taper/conversion to PO - to be outlined by neurology  Awaiting molecular fungal diagnostics from [facility]

## 2017-11-23 NOTE — PROGRESS NOTE ADULT - SUBJECTIVE AND OBJECTIVE BOX
INTERVAL HPI/OVERNIGHT EVENTS:  Subjectively better  Took a long shower and now feels some HA and very mild photophobia  Occasional dry cough    Received a notification from Dr. Westbrook last night that 16S ribosomal RNA is negative  Family now thinking whether they proceed with viral testing    Voriconazole changed to PO    MEDICATIONS  (STANDING):  dexamethasone     Tablet 6 milliGRAM(s) Oral every 6 hours  dextrose 5%. 1000 milliLiter(s) (50 mL/Hr) IV Continuous <Continuous>  dextrose 50% Injectable 12.5 Gram(s) IV Push once  dextrose 50% Injectable 25 Gram(s) IV Push once  dextrose 50% Injectable 25 Gram(s) IV Push once  influenza   Vaccine 0.5 milliLiter(s) IntraMuscular once  insulin lispro (HumaLOG) corrective regimen sliding scale   SubCutaneous Before meals and at bedtime  pantoprazole    Tablet 40 milliGRAM(s) Oral before breakfast  polyethylene glycol 3350 17 Gram(s) Oral every 12 hours  senna 2 Tablet(s) Oral at bedtime  sodium chloride 0.9%. 1000 milliLiter(s) (125 mL/Hr) IV Continuous <Continuous>  voriconazole 250 milliGRAM(s) Oral every 12 hours    MEDICATIONS  (PRN):  acetaminophen   Tablet 650 milliGRAM(s) Oral every 6 hours PRN For Temp greater than 38 C (100.4 F)  acetaminophen   Tablet. 650 milliGRAM(s) Oral every 6 hours PRN Mild Pain (1 - 3)  dextrose Gel 1 Dose(s) Oral once PRN Blood Glucose LESS THAN 70 milliGRAM(s)/deciliter  diphenhydrAMINE   Capsule 25 milliGRAM(s) Oral at bedtime PRN Rash and/or Itching  glucagon  Injectable 1 milliGRAM(s) IntraMuscular once PRN Glucose LESS THAN 70 milligrams/deciliter  ondansetron    Tablet 4 milliGRAM(s) Oral every 8 hours PRN Nausea  oxyCODONE    IR 10 milliGRAM(s) Oral every 4 hours PRN Severe Pain (7 - 10)  oxyCODONE    IR 5 milliGRAM(s) Oral every 4 hours PRN Moderate Pain (4 - 6)      Allergies    aspirin (Hives; Swelling)  ibuprofen (Rash; Flushing; Hives)  lidocaine (Short breath)      EXAM  Vital Signs Last 24 Hrs  T(C): 36.8 (23 Nov 2017 09:59), Max: 38.7 (22 Nov 2017 22:02)  T(F): 98.2 (23 Nov 2017 09:59), Max: 101.6 (22 Nov 2017 22:02)  HR: 98 (23 Nov 2017 09:59) (73 - 98)  BP: 96/63 (23 Nov 2017 09:59) (96/55 - 104/65)  BP(mean): --  RR: 18 (23 Nov 2017 09:59) (17 - 18)  SpO2: 98% (23 Nov 2017 09:59) (96% - 98%)  On RA Awake and alert  Neck supple  EOMI  No rash  RRR  Chest CTA  Abd soft NT    LABS:    11-23    135  |  97  |  16  ----------------------------<  128<H>  4.1   |  22  |  0.58    Ca    8.7      23 Nov 2017 08:26    TPro  6.7  /  Alb  3.9  /  TBili  0.4  /  DBili  <0.2  /  AST  17  /  ALT  42  /  AlkPhos  41  11-23      Bartonella IgG/IgM Antibodies (11.19.17 @ 10:26)    Bartonella henselae IgG Level: <1:128 titer    Bartonella dooley IgG Level: <1:128 titer    Bartonella henselae IgM Level: <1:20 titer    Bartonella dooley IgM Level: <1:20: -------------------ADDITIONAL INFORMATION-------------------  This test was developed and its performance characteristics  determined by [facility] in a manner consistent with CLIA  requirements. This test has not been cleared or approved by  the U.S. Food and Drug Administration.  Test Performed by:  [facility] - [address] [id number] titer    Leptospirosis Antibodies (11.19.17 @ 10:26)    Leptospirosis Antibodies: Negative: No IgM antibodies to Leptospira detected.  Since antibodies  may not be present or may be present at undetectable levels  during early disease, repeat testing of a convalescent  sample collected in 2-3 weeks is recommended.  Test Performed by:  [facility] - [address] [address]    CSF cultures remain negative    MICROBIOLOGY:

## 2017-11-23 NOTE — PROGRESS NOTE ADULT - ASSESSMENT
32F w/PMH lumbar disk herniation s/p epidural injection 8/17 and recent neg w/u for similar presentation at [facility] (11/6) and [facility] (11/14) presenting with worsening headache, photophobia, neck stiffness, myalgias, fever, weakness, bl shooting back pain, now with PMN predominance on LP but no identified organisms on GS and negative PCR, concerning for infectious meningitis, but cultures NGTD. PCR negative. Many CSF studies still pending. Negative so far. Gallium scan negative. Patient reports continued fevers and chills and hallucinations after initiation of voriconazole.     It is possible that this patient does have a fungal meningitis as fungal cultures take a long time to grow, bacterial is less likely as the cultures have been negative. I believe that the patient has a viral meningitis which has been slow to resolve. The fevers do persist, but clinically the patient is getting better with improvement in her overall meningeal symptoms.

## 2017-11-23 NOTE — PROGRESS NOTE ADULT - PROBLEM SELECTOR PLAN 1
Pt with previously negative workup, now with PMN predominance on CSF and s/s of meningitis. Pt w/Tmax 103.5 and LP s/f neutrophilic predominance, suggestive of bacterial etiology; however, CSF PCR panel, CSF bacterial cultures and gram stain here and from [place] are neg.  - decadron 6mg IV q6hr transitioned to PO. Consider taper as patient has been improving.  - All medications PO now, IV holiday. No IV access currently.  - Dr. Osborne following for pain management, appreciate recs.  - Dr. Miller following and coordinating outside lab studies.  - BCx and CSF bacteria cultures NGTD

## 2017-11-24 LAB
ACE SERPL-CCNC: 27 U/L — SIGNIFICANT CHANGE UP (ref 14–82)
ALBUMIN SERPL ELPH-MCNC: 3.3 G/DL — SIGNIFICANT CHANGE UP (ref 3.3–5)
ALP SERPL-CCNC: 38 U/L — LOW (ref 40–120)
ALT FLD-CCNC: 75 U/L — HIGH (ref 10–45)
ANION GAP SERPL CALC-SCNC: 14 MMOL/L — SIGNIFICANT CHANGE UP (ref 5–17)
AST SERPL-CCNC: 19 U/L — SIGNIFICANT CHANGE UP (ref 10–40)
BILIRUB SERPL-MCNC: 0.4 MG/DL — SIGNIFICANT CHANGE UP (ref 0.2–1.2)
BRUCELLA IGG+IGM PNL SER: SIGNIFICANT CHANGE UP
BUN SERPL-MCNC: 15 MG/DL — SIGNIFICANT CHANGE UP (ref 7–23)
CALCIUM SERPL-MCNC: 8.7 MG/DL — SIGNIFICANT CHANGE UP (ref 8.4–10.5)
CHLORIDE SERPL-SCNC: 95 MMOL/L — LOW (ref 96–108)
CO2 SERPL-SCNC: 23 MMOL/L — SIGNIFICANT CHANGE UP (ref 22–31)
CREAT SERPL-MCNC: 0.64 MG/DL — SIGNIFICANT CHANGE UP (ref 0.5–1.3)
CULTURE RESULTS: SIGNIFICANT CHANGE UP
CULTURE RESULTS: SIGNIFICANT CHANGE UP
GLUCOSE BLDC GLUCOMTR-MCNC: 133 MG/DL — HIGH (ref 70–99)
GLUCOSE BLDC GLUCOMTR-MCNC: 176 MG/DL — HIGH (ref 70–99)
GLUCOSE BLDC GLUCOMTR-MCNC: 182 MG/DL — HIGH (ref 70–99)
GLUCOSE SERPL-MCNC: 147 MG/DL — HIGH (ref 70–99)
HCT VFR BLD CALC: 36.4 % — SIGNIFICANT CHANGE UP (ref 34.5–45)
HGB BLD-MCNC: 12.4 G/DL — SIGNIFICANT CHANGE UP (ref 11.5–15.5)
IGA FLD-MCNC: 106 MG/DL — SIGNIFICANT CHANGE UP (ref 68–378)
IGG FLD-MCNC: 757 MG/DL — SIGNIFICANT CHANGE UP (ref 694–1618)
IGG4 SER-MCNC: 5.6 MG/DL — SIGNIFICANT CHANGE UP (ref 2.4–121)
IGM SERPL-MCNC: 238 MG/DL — HIGH (ref 40–230)
KAPPA LC SER QL IFE: 0.68 MG/DL — SIGNIFICANT CHANGE UP (ref 0.33–1.94)
KAPPA/LAMBDA FREE LIGHT CHAIN RATIO, SERUM: 0.55 RATIO — SIGNIFICANT CHANGE UP (ref 0.26–1.65)
LAMBDA LC SER QL IFE: 1.23 MG/DL — SIGNIFICANT CHANGE UP (ref 0.57–2.63)
MAGNESIUM SERPL-MCNC: 2.3 MG/DL — SIGNIFICANT CHANGE UP (ref 1.6–2.6)
MCHC RBC-ENTMCNC: 29 PG — SIGNIFICANT CHANGE UP (ref 27–34)
MCHC RBC-ENTMCNC: 34.1 G/DL — SIGNIFICANT CHANGE UP (ref 32–36)
MCV RBC AUTO: 85 FL — SIGNIFICANT CHANGE UP (ref 80–100)
MISCELLANEOUS TEST NAME: SIGNIFICANT CHANGE UP
PLATELET # BLD AUTO: 305 K/UL — SIGNIFICANT CHANGE UP (ref 150–400)
POTASSIUM SERPL-MCNC: 3.9 MMOL/L — SIGNIFICANT CHANGE UP (ref 3.5–5.3)
POTASSIUM SERPL-SCNC: 3.9 MMOL/L — SIGNIFICANT CHANGE UP (ref 3.5–5.3)
PROT SERPL-MCNC: 6.1 G/DL — SIGNIFICANT CHANGE UP (ref 6–8.3)
RBC # BLD: 4.28 M/UL — SIGNIFICANT CHANGE UP (ref 3.8–5.2)
RBC # FLD: 12.3 % — SIGNIFICANT CHANGE UP (ref 10.3–16.9)
SODIUM SERPL-SCNC: 132 MMOL/L — LOW (ref 135–145)
SPECIMEN SOURCE: SIGNIFICANT CHANGE UP
SPECIMEN SOURCE: SIGNIFICANT CHANGE UP
VIT D25+D1,25 OH+D1,25 PNL SERPL-MCNC: 78.2 PG/ML — SIGNIFICANT CHANGE UP (ref 19.9–79.3)
WBC # BLD: 11 K/UL — HIGH (ref 3.8–10.5)
WBC # FLD AUTO: 11 K/UL — HIGH (ref 3.8–10.5)

## 2017-11-24 RX ORDER — DEXAMETHASONE 0.5 MG/5ML
ELIXIR ORAL
Qty: 0 | Refills: 0 | Status: DISCONTINUED | OUTPATIENT
Start: 2017-11-24 | End: 2017-11-24

## 2017-11-24 RX ORDER — DEXAMETHASONE 0.5 MG/5ML
ELIXIR ORAL
Qty: 0 | Refills: 0 | Status: COMPLETED | OUTPATIENT
Start: 2017-11-25 | End: 2017-11-29

## 2017-11-24 RX ORDER — DEXAMETHASONE 0.5 MG/5ML
3 ELIXIR ORAL EVERY 6 HOURS
Qty: 0 | Refills: 0 | Status: COMPLETED | OUTPATIENT
Start: 2017-11-26 | End: 2017-11-27

## 2017-11-24 RX ORDER — DEXAMETHASONE 0.5 MG/5ML
4 ELIXIR ORAL EVERY 6 HOURS
Qty: 0 | Refills: 0 | Status: COMPLETED | OUTPATIENT
Start: 2017-11-25 | End: 2017-11-26

## 2017-11-24 RX ORDER — DEXAMETHASONE 0.5 MG/5ML
1 ELIXIR ORAL EVERY 6 HOURS
Qty: 0 | Refills: 0 | Status: COMPLETED | OUTPATIENT
Start: 2017-11-28 | End: 2017-11-29

## 2017-11-24 RX ORDER — DEXAMETHASONE 0.5 MG/5ML
5 ELIXIR ORAL EVERY 6 HOURS
Qty: 0 | Refills: 0 | Status: COMPLETED | OUTPATIENT
Start: 2017-11-24 | End: 2017-11-25

## 2017-11-24 RX ORDER — DEXAMETHASONE 0.5 MG/5ML
5 ELIXIR ORAL EVERY 6 HOURS
Qty: 0 | Refills: 0 | Status: DISCONTINUED | OUTPATIENT
Start: 2017-11-24 | End: 2017-11-24

## 2017-11-24 RX ORDER — DEXAMETHASONE 0.5 MG/5ML
2 ELIXIR ORAL EVERY 6 HOURS
Qty: 0 | Refills: 0 | Status: COMPLETED | OUTPATIENT
Start: 2017-11-27 | End: 2017-11-28

## 2017-11-24 RX ADMIN — Medication 30 MILLILITER(S): at 12:26

## 2017-11-24 RX ADMIN — Medication 2: at 12:27

## 2017-11-24 RX ADMIN — Medication 6 MILLIGRAM(S): at 02:30

## 2017-11-24 RX ADMIN — Medication 2: at 17:52

## 2017-11-24 RX ADMIN — VORICONAZOLE 250 MILLIGRAM(S): 10 INJECTION, POWDER, LYOPHILIZED, FOR SOLUTION INTRAVENOUS at 00:14

## 2017-11-24 RX ADMIN — Medication 5 MILLIGRAM(S): at 11:46

## 2017-11-24 RX ADMIN — OXYCODONE HYDROCHLORIDE 5 MILLIGRAM(S): 5 TABLET ORAL at 11:54

## 2017-11-24 RX ADMIN — OXYCODONE HYDROCHLORIDE 5 MILLIGRAM(S): 5 TABLET ORAL at 12:54

## 2017-11-24 RX ADMIN — Medication 650 MILLIGRAM(S): at 16:13

## 2017-11-24 RX ADMIN — PANTOPRAZOLE SODIUM 40 MILLIGRAM(S): 20 TABLET, DELAYED RELEASE ORAL at 06:23

## 2017-11-24 RX ADMIN — Medication 25 MILLIGRAM(S): at 00:14

## 2017-11-24 RX ADMIN — Medication 650 MILLIGRAM(S): at 06:23

## 2017-11-24 RX ADMIN — Medication 5 MILLIGRAM(S): at 16:14

## 2017-11-24 RX ADMIN — SENNA PLUS 2 TABLET(S): 8.6 TABLET ORAL at 22:26

## 2017-11-24 RX ADMIN — VORICONAZOLE 250 MILLIGRAM(S): 10 INJECTION, POWDER, LYOPHILIZED, FOR SOLUTION INTRAVENOUS at 11:46

## 2017-11-24 RX ADMIN — Medication 6 MILLIGRAM(S): at 08:05

## 2017-11-24 RX ADMIN — VORICONAZOLE 250 MILLIGRAM(S): 10 INJECTION, POWDER, LYOPHILIZED, FOR SOLUTION INTRAVENOUS at 23:59

## 2017-11-24 NOTE — PROGRESS NOTE ADULT - SUBJECTIVE AND OBJECTIVE BOX
INTERVAL HPI/OVERNIGHT EVENTS:    Subjectively much better  However still had a headache last night.   Does not feel her fever.    MEDICATIONS  (STANDING):  dexamethasone     Tablet 5 milliGRAM(s) Oral every 6 hours  dexamethasone     Tablet   Oral   dextrose 5%. 1000 milliLiter(s) (50 mL/Hr) IV Continuous <Continuous>  dextrose 50% Injectable 12.5 Gram(s) IV Push once  dextrose 50% Injectable 25 Gram(s) IV Push once  dextrose 50% Injectable 25 Gram(s) IV Push once  influenza   Vaccine 0.5 milliLiter(s) IntraMuscular once  insulin lispro (HumaLOG) corrective regimen sliding scale   SubCutaneous Before meals and at bedtime  pantoprazole    Tablet 40 milliGRAM(s) Oral before breakfast  polyethylene glycol 3350 17 Gram(s) Oral every 12 hours  senna 2 Tablet(s) Oral at bedtime  voriconazole 250 milliGRAM(s) Oral every 12 hours    MEDICATIONS  (PRN):  acetaminophen   Tablet 650 milliGRAM(s) Oral every 6 hours PRN For Temp greater than 38 C (100.4 F)  acetaminophen   Tablet. 650 milliGRAM(s) Oral every 6 hours PRN Mild Pain (1 - 3)  acetaminophen 325 mG/butalbital 50 mG/caffeine 40 mG 1 Tablet(s) Oral once PRN HA  aluminum hydroxide/magnesium hydroxide/simethicone Suspension 30 milliLiter(s) Oral every 4 hours PRN Dyspepsia  dextrose Gel 1 Dose(s) Oral once PRN Blood Glucose LESS THAN 70 milliGRAM(s)/deciliter  diphenhydrAMINE   Capsule 25 milliGRAM(s) Oral at bedtime PRN Rash and/or Itching  glucagon  Injectable 1 milliGRAM(s) IntraMuscular once PRN Glucose LESS THAN 70 milligrams/deciliter  ondansetron    Tablet 4 milliGRAM(s) Oral every 8 hours PRN Nausea  oxyCODONE    IR 10 milliGRAM(s) Oral every 4 hours PRN Severe Pain (7 - 10)  oxyCODONE    IR 5 milliGRAM(s) Oral every 4 hours PRN Moderate Pain (4 - 6)      Allergies    aspirin (Hives; Swelling)  ibuprofen (Rash; Flushing; Hives)  lidocaine (Short breath)    EXAM  Vital Signs Last 24 Hrs  T(C): 38.8 (24 Nov 2017 16:09), Max: 38.8 (24 Nov 2017 16:09)  T(F): 101.9 (24 Nov 2017 16:09), Max: 101.9 (24 Nov 2017 16:09)  HR: 90 (24 Nov 2017 16:09) (73 - 90)  BP: 107/93 (24 Nov 2017 16:09) (100/57 - 107/93)  BP(mean): --  RR: 20 (24 Nov 2017 16:09) (18 - 20)  SpO2: 99% (24 Nov 2017 16:09) (96% - 99%)  On RA  Awake and alert  Comfortable appearing  No rash  Neck supple  Otherwise unchanged    LABS:                        12.4   11.0  )-----------( 305      ( 24 Nov 2017 07:48 )             36.4     11-24    132<L>  |  95<L>  |  15  ----------------------------<  147<H>  3.9   |  23  |  0.64    Ca    8.7      24 Nov 2017 07:48  Mg     2.3     11-24    TPro  6.1  /  Alb  3.3  /  TBili  0.4  /  DBili  x   /  AST  19  /  ALT  75<H>  /  AlkPhos  38<L>  11-24      MICROBIOLOGY:  Cultures negative    Brucella IgG/IgM Antibody Panel (11.22.17 @ 16:53)    Brucella IgG/IgM Antibody Panel: See Note: Test                                 Result       Flag  Unit  RefValue  ----------------------------------------------------------------------  Brucella Ab Screen, IgG and IgM, S    Brucella Ab Screen, IgG, S         Negative                 Negative    Brucella Ab Screen, IgM, S         Negative                 Negative    Interpretation                     See Note  Recommend repeat testing in 14-21 days if recent infection  is suspected.  Test Performed by:  [facility] [address] [facility]  [address] [place] [address]      Miscellaneous Test Name (11.18.17 @ 22:59)    Miscellaneous Test Name:     Fungitell, CSF    Fungitell, CSF        <60 pg/mL    The performance characteristics of the Fungitell assay in  CSF have been determined by [facility]; there are no  established criteria for the interpretation of Fungitell  results from CSF. Research studies have evaluated the use  of the Fungitell assay in CSF during a fungal meningitis  outbreak (J. Clin. Microbiol. 2013, 51(4):1285?1287). The  Fungitell Beta-D Glucan assay detects (1,3)- Beta-D-glucan  from the following pathogens: Terri spp., Acremonium,  Aspergillus spp., Coccidioides immitis, Fusarium spp.,  Histoplasma capsulatum, Trichosporon spp., Sporothrix  schenckii, Saccharomyces cerevisiae, and Pneumocystis  jiroveci. The Fungitell Beta-D Glucan assay does not detect  certain fungal species such as the genus Cryptococcus,  which produces very low levels of (1,3)- Beta-D-glucan, nor  the Zygomycetes, such as Absidia, Mucor, and Rhizopus,  which are not known to produce (1,3)- Beta-D-glucan.  Studies indicate Blastomyces dermatitidis is usually not  detected due to little (1,3)- Beta-D-glucan produced in the  yeast phase. If sample result is greater than 500 pg/mL,  physician may order a titer of the sample. Please contact  [facility] if you would like toorder a retest of  this sample to obtain an actual value. Samples are held for  1 week after initial testing date.        Test Performed by:        [facility]        [address] [place] [address] [phone]

## 2017-11-24 NOTE — PROGRESS NOTE ADULT - PROBLEM SELECTOR PLAN 3
F: no IVF  E: Replete PRN  N: Reg diet  PPX: No VTE ppx indicated, IMPROVE score 0; Droplet precautions    Full Code  DISPO: RMF, pending transfer to [place].

## 2017-11-24 NOTE — PROGRESS NOTE ADULT - SUBJECTIVE AND OBJECTIVE BOX
Brief Pain Management Follow up    S: (per report w/ family member, mother at bedside, pt not available for interview/assessment)  Pain has been stable since dc of Oxy 10mg BID, steroid taper has started and spoke with mother at bedside concerning daughter's condition o/n, stated that with taper of decadron, pressure like headaches are present, increased in frequency (1-2x/day), but moderate in severity, states that Benadryl with addition of Oxy-IR provided her with a good night's sleep. Some residual lumbar pain    O: Unable to Perform    A:   32F w/PMH HNP s/p epidural injection 8/17 p/w worsening headache, photophobia, neck stiffness, myalgias, fever, weakness, bl shooting back pain, concerning for infectious meningitis,    P:   - Unable to assess pt at this interval, will be rounding on Sunday again, call [phone] if any concerns with plan.  - Continue with Oxy-IR 5-10mg q4h PRN for Mod-Severe Pain, Trial of Fioricet for Migraines  - Encouraged use of Oxy-IR for diffuse pain, given pt still has lumbar component of pain, and trial of Fioricet for isolated HA, to assess benefit over Oxy-IR

## 2017-11-24 NOTE — PROGRESS NOTE ADULT - ASSESSMENT
32F w/PMH lumbar disk herniation s/p epidural injection 8/17 and recent neg w/u for similar presentation at [facility] (11/6) and [facility] (11/14) presenting with worsening headache, photophobia, neck stiffness, myalgias, fever, weakness, bl shooting back pain, now with PMN predominance on LP but no identified organisms on GS and negative PCR, concerning for infectious meningitis, but cultures NGTD. PCR negative. Many CSF studies still pending. Negative so far. Gallium scan negative. Patient reports continued fevers and chills and hallucinations after initiation of voriconazole.     It is possible that this patient does have a fungal meningitis as fungal cultures take a long time to grow although fungitell negative, bacterial is less likely as the cultures have been negative. I believe that the patient has a viral meningitis which has been slow to resolve. The fevers do persist, but clinically the patient is getting better with improvement in her overall meningeal symptoms. Continues to improve.

## 2017-11-24 NOTE — PROGRESS NOTE ADULT - SUBJECTIVE AND OBJECTIVE BOX
Neurology Follow up note    Name  JIM WILKINS    HPI:  32[place] with PMH of lumbar disc herniation (s/p epidural injection on 8/17) who was at [facility] on 11/13 for severe HA partially relieved by lying down, neck pain, fever, weakness, myalgias, back pain radiating to bl thighs.     Pt reports first experiencing similar symptoms 10 days ago (went to [facility] in [place] - neg w/u: LP, lumbar MRI w/contr, head CT, West Nile virus - discharged w/supportive tx + fiorocet).     Pt then went to Dr Carty and was sent to ED.  At [facility], neg MRI brain, neg RVP, neg BCx, neg HIV, evaluated by ID and Neuro - discharged home and felt better until 11/14 in pm - worsening of headache, photophobia and neck stiffness. She denied any other symptoms.  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   Denied: recent travel, IV drug use, unprotected intercourse, any rashes or bites, invasive procedures other than epidural injection in 08/2017.  In the ER, pt s/p IV hydromorphone and IVF, w/partial relief of headache; neck stiffness, photophobia, chills. ROS otherwise negative. Requesting food.  VS in ED: T(F): 101.5  HR: 108  BP: 89/57 RR: 16 SpO2: 98% (15 Nov 2017 19:50)      Interval History - no new neuro symptoms- no significant headache or photophobia        REVIEW OF SYSTEMS    Vital Signs Last 24 Hrs  T(C): 37.3 (24 Nov 2017 08:59), Max: 38.7 (24 Nov 2017 06:07)  T(F): 99.2 (24 Nov 2017 08:59), Max: 101.6 (24 Nov 2017 06:07)  HR: 73 (24 Nov 2017 08:59) (73 - 98)  BP: 100/57 (24 Nov 2017 08:59) (100/57 - 104/55)  BP(mean): --  RR: 18 (24 Nov 2017 08:59) (18 - 20)  SpO2: 96% (24 Nov 2017 08:59) (96% - 97%)    Physical Exam-     Mental Status- awake and alert    Cranial Nerves-nl    Gait and station- no foot drop    Motor-nl    Reflexes-nl    Sensation-nl    Coordination-nl    Vascular -    Medications  acetaminophen   Tablet 650 milliGRAM(s) Oral every 6 hours PRN  acetaminophen   Tablet. 650 milliGRAM(s) Oral every 6 hours PRN  dexamethasone     Tablet 6 milliGRAM(s) Oral every 6 hours  dextrose 5%. 1000 milliLiter(s) IV Continuous <Continuous>  dextrose 50% Injectable 12.5 Gram(s) IV Push once  dextrose 50% Injectable 25 Gram(s) IV Push once  dextrose 50% Injectable 25 Gram(s) IV Push once  dextrose Gel 1 Dose(s) Oral once PRN  diphenhydrAMINE   Capsule 25 milliGRAM(s) Oral at bedtime PRN  glucagon  Injectable 1 milliGRAM(s) IntraMuscular once PRN  influenza   Vaccine 0.5 milliLiter(s) IntraMuscular once  insulin lispro (HumaLOG) corrective regimen sliding scale   SubCutaneous Before meals and at bedtime  ondansetron    Tablet 4 milliGRAM(s) Oral every 8 hours PRN  oxyCODONE    IR 10 milliGRAM(s) Oral every 4 hours PRN  oxyCODONE    IR 5 milliGRAM(s) Oral every 4 hours PRN  pantoprazole    Tablet 40 milliGRAM(s) Oral before breakfast  polyethylene glycol 3350 17 Gram(s) Oral every 12 hours  senna 2 Tablet(s) Oral at bedtime  voriconazole 250 milliGRAM(s) Oral every 12 hours      Lab      Radiology    Assessment- Meningitis    Plan- as per ID   Decadron- today 5mg QID today-  Tomorrow 4mg QID

## 2017-11-24 NOTE — PROGRESS NOTE ADULT - PROBLEM SELECTOR PLAN 1
Pt with previously negative workup, now with PMN predominance on CSF and s/s of meningitis. Pt w/Tmax 103.5 and LP s/f neutrophilic predominance, suggestive of bacterial etiology; however, CSF PCR panel, CSF bacterial cultures and gram stain here and from [place] are neg.  - Decadron taper 5-4-3-2-1. Daily.  - All medications PO now, IV holiday. No IV access currently.  - Dr. Osborne following for pain management, appreciate recs.  - Dr. Miller following and coordinating outside lab studies.  - BCx and CSF bacteria cultures, fungitell negative NGTD

## 2017-11-24 NOTE — PROGRESS NOTE ADULT - SUBJECTIVE AND OBJECTIVE BOX
SUBJECTIVE / INTERVAL HPI: Patient seen and examined at bedside.     VITAL SIGNS:  Vital Signs Last 24 Hrs  T(C): 38.7 (24 Nov 2017 06:07), Max: 38.7 (24 Nov 2017 06:07)  T(F): 101.6 (24 Nov 2017 06:07), Max: 101.6 (24 Nov 2017 06:07)  HR: 79 (24 Nov 2017 06:07) (77 - 98)  BP: 104/55 (24 Nov 2017 06:07) (96/63 - 104/55)  BP(mean): --  RR: 20 (24 Nov 2017 06:07) (18 - 20)  SpO2: 97% (24 Nov 2017 06:07) (97% - 98%)    PHYSICAL EXAM:    General: WDWN  HEENT: NC/AT; PERRL, clear conjunctiva  Neck: supple  Cardiovascular: +S1/S2; RRR  Respiratory: CTA b/l; no W/R/R  Gastrointestinal: soft, NT/ND; +BSx4  Extremities: WWP; 2+ peripheral pulses; no edema   Neurological: AAOx3; no focal deficits    MEDICATIONS:  MEDICATIONS  (STANDING):  dexamethasone     Tablet 6 milliGRAM(s) Oral every 6 hours  dextrose 5%. 1000 milliLiter(s) (50 mL/Hr) IV Continuous <Continuous>  dextrose 50% Injectable 12.5 Gram(s) IV Push once  dextrose 50% Injectable 25 Gram(s) IV Push once  dextrose 50% Injectable 25 Gram(s) IV Push once  influenza   Vaccine 0.5 milliLiter(s) IntraMuscular once  insulin lispro (HumaLOG) corrective regimen sliding scale   SubCutaneous Before meals and at bedtime  pantoprazole    Tablet 40 milliGRAM(s) Oral before breakfast  polyethylene glycol 3350 17 Gram(s) Oral every 12 hours  senna 2 Tablet(s) Oral at bedtime  voriconazole 250 milliGRAM(s) Oral every 12 hours    MEDICATIONS  (PRN):  acetaminophen   Tablet 650 milliGRAM(s) Oral every 6 hours PRN For Temp greater than 38 C (100.4 F)  acetaminophen   Tablet. 650 milliGRAM(s) Oral every 6 hours PRN Mild Pain (1 - 3)  dextrose Gel 1 Dose(s) Oral once PRN Blood Glucose LESS THAN 70 milliGRAM(s)/deciliter  diphenhydrAMINE   Capsule 25 milliGRAM(s) Oral at bedtime PRN Rash and/or Itching  glucagon  Injectable 1 milliGRAM(s) IntraMuscular once PRN Glucose LESS THAN 70 milligrams/deciliter  ondansetron    Tablet 4 milliGRAM(s) Oral every 8 hours PRN Nausea  oxyCODONE    IR 10 milliGRAM(s) Oral every 4 hours PRN Severe Pain (7 - 10)  oxyCODONE    IR 5 milliGRAM(s) Oral every 4 hours PRN Moderate Pain (4 - 6)      ALLERGIES:  Allergies    aspirin (Hives; Swelling)  ibuprofen (Rash; Flushing; Hives)  lidocaine (Short breath)    Intolerances        LABS:    11-23    135  |  97  |  16  ----------------------------<  128<H>  4.1   |  22  |  0.58    Ca    8.7      23 Nov 2017 08:26    TPro  6.7  /  Alb  3.9  /  TBili  0.4  /  DBili  <0.2  /  AST  17  /  ALT  42  /  AlkPhos  41  11-23        CAPILLARY BLOOD GLUCOSE      POCT Blood Glucose.: 243 mg/dL (23 Nov 2017 20:42)      RADIOLOGY & ADDITIONAL TESTS: Reviewed. SUBJECTIVE / INTERVAL HPI: Low grade fever this AM. Patient seen and examined at bedside. Reports improvement in her photophobia. Continues to report some nuchal rigidity and headache. Denies N/V/Weakness/CP/SOB. ROS otherwise negative.    VITAL SIGNS:  Vital Signs Last 24 Hrs  T(C): 38.7 (24 Nov 2017 06:07), Max: 38.7 (24 Nov 2017 06:07)  T(F): 101.6 (24 Nov 2017 06:07), Max: 101.6 (24 Nov 2017 06:07)  HR: 79 (24 Nov 2017 06:07) (77 - 98)  BP: 104/55 (24 Nov 2017 06:07) (96/63 - 104/55)  BP(mean): --  RR: 20 (24 Nov 2017 06:07) (18 - 20)  SpO2: 97% (24 Nov 2017 06:07) (97% - 98%)    PHYSICAL EXAM:    General: WDWN, resting in bed comfortably, NAD.  HEENT: NC/AT; PERRL, clear conjunctiva. No photophobia.  Neck: Minimal neck stiffness.  Respiratory: CTA b/l. No w/r/r.   Cardiovascular: +S1/S2; RRR. No m/r/g.  Abdomen: soft, NT/ND; +BS x4  Extremities: WWP, 2+ peripheral pulses b/l; no LE edema  Skin: normal color and turgor; no rash  Neurological: AAOx3. EOMI. No focal deficits. Strength/sensation 5/5 throughout. Hyperreflexia lower extremity bilaterally. Babinski negative. Mildly +Kernig. Mildly +Brudzinski.    MEDICATIONS:  MEDICATIONS  (STANDING):  dexamethasone     Tablet 6 milliGRAM(s) Oral every 6 hours  dextrose 5%. 1000 milliLiter(s) (50 mL/Hr) IV Continuous <Continuous>  dextrose 50% Injectable 12.5 Gram(s) IV Push once  dextrose 50% Injectable 25 Gram(s) IV Push once  dextrose 50% Injectable 25 Gram(s) IV Push once  influenza   Vaccine 0.5 milliLiter(s) IntraMuscular once  insulin lispro (HumaLOG) corrective regimen sliding scale   SubCutaneous Before meals and at bedtime  pantoprazole    Tablet 40 milliGRAM(s) Oral before breakfast  polyethylene glycol 3350 17 Gram(s) Oral every 12 hours  senna 2 Tablet(s) Oral at bedtime  voriconazole 250 milliGRAM(s) Oral every 12 hours    MEDICATIONS  (PRN):  acetaminophen   Tablet 650 milliGRAM(s) Oral every 6 hours PRN For Temp greater than 38 C (100.4 F)  acetaminophen   Tablet. 650 milliGRAM(s) Oral every 6 hours PRN Mild Pain (1 - 3)  dextrose Gel 1 Dose(s) Oral once PRN Blood Glucose LESS THAN 70 milliGRAM(s)/deciliter  diphenhydrAMINE   Capsule 25 milliGRAM(s) Oral at bedtime PRN Rash and/or Itching  glucagon  Injectable 1 milliGRAM(s) IntraMuscular once PRN Glucose LESS THAN 70 milligrams/deciliter  ondansetron    Tablet 4 milliGRAM(s) Oral every 8 hours PRN Nausea  oxyCODONE    IR 10 milliGRAM(s) Oral every 4 hours PRN Severe Pain (7 - 10)  oxyCODONE    IR 5 milliGRAM(s) Oral every 4 hours PRN Moderate Pain (4 - 6)      ALLERGIES:  Allergies    aspirin (Hives; Swelling)  ibuprofen (Rash; Flushing; Hives)  lidocaine (Short breath)    Intolerances        LABS:    11-23    135  |  97  |  16  ----------------------------<  128<H>  4.1   |  22  |  0.58    Ca    8.7      23 Nov 2017 08:26    TPro  6.7  /  Alb  3.9  /  TBili  0.4  /  DBili  <0.2  /  AST  17  /  ALT  42  /  AlkPhos  41  11-23        CAPILLARY BLOOD GLUCOSE      POCT Blood Glucose.: 243 mg/dL (23 Nov 2017 20:42)      RADIOLOGY & ADDITIONAL TESTS: Reviewed.

## 2017-11-24 NOTE — PROGRESS NOTE ADULT - ASSESSMENT
32F w/PMH lumbar disk herniation s/p epidural injection 8/17 and recent neg w/u for similar presentation at [facility] (11/6) and [facility] (11/14) presenting with worsening headache, photophobia, neck stiffness, myalgias, fever, weakness, bl shooting back pain, now with PMN predominance on LP but no identified organisms on GS and negative PCR, concerning for infectious meningitis,    Overall better but still with intermittent headaches and fever  steroid taper per dr. barrios .  antimicrobials per ID

## 2017-11-24 NOTE — PROGRESS NOTE ADULT - ASSESSMENT
Meningitis of unclear etiology  Still presuming infectious  16S Ribosomal RNA in CSF negative  Fungal PCR pending with [facility]  Viral molecular diagnostics on hold for the time being, as per family request due to cost  Patient appears much better clinically but still with fever - this is most likely due to the meningitis. But steroids exposure places patient at risk for Herpes viral reactivation.    RECOMMEND  Steroids taper - this is being done per Neurology guidance  Continue Voriconazole - level pending   Please assure blood send for: CMV PCR and EBV serology.   Monitor temp curve several times a day

## 2017-11-24 NOTE — PROGRESS NOTE ADULT - SUBJECTIVE AND OBJECTIVE BOX
coverage for Dr. Lopez    Pt seen and examined     REVIEW OF SYSTEMS:  Constitutional: No fever, weight loss or fatigue  Cardiovascular: No chest pain, palpitations, dizziness or leg swelling  Gastrointestinal: No abdominal or epigastric pain. No nausea, vomiting or hematemesis; No diarrhea or constipation. No melena or hematochezia.  Skin: No itching, burning, rashes or lesions       MEDICATIONS:  MEDICATIONS  (STANDING):  dexamethasone     Tablet   Oral   dexamethasone     Tablet 5 milliGRAM(s) Oral every 6 hours  dextrose 5%. 1000 milliLiter(s) (50 mL/Hr) IV Continuous <Continuous>  dextrose 50% Injectable 12.5 Gram(s) IV Push once  dextrose 50% Injectable 25 Gram(s) IV Push once  dextrose 50% Injectable 25 Gram(s) IV Push once  influenza   Vaccine 0.5 milliLiter(s) IntraMuscular once  insulin lispro (HumaLOG) corrective regimen sliding scale   SubCutaneous Before meals and at bedtime  pantoprazole    Tablet 40 milliGRAM(s) Oral before breakfast  polyethylene glycol 3350 17 Gram(s) Oral every 12 hours  senna 2 Tablet(s) Oral at bedtime  voriconazole 250 milliGRAM(s) Oral every 12 hours    MEDICATIONS  (PRN):  acetaminophen   Tablet 650 milliGRAM(s) Oral every 6 hours PRN For Temp greater than 38 C (100.4 F)  acetaminophen   Tablet. 650 milliGRAM(s) Oral every 6 hours PRN Mild Pain (1 - 3)  dextrose Gel 1 Dose(s) Oral once PRN Blood Glucose LESS THAN 70 milliGRAM(s)/deciliter  diphenhydrAMINE   Capsule 25 milliGRAM(s) Oral at bedtime PRN Rash and/or Itching  glucagon  Injectable 1 milliGRAM(s) IntraMuscular once PRN Glucose LESS THAN 70 milligrams/deciliter  ondansetron    Tablet 4 milliGRAM(s) Oral every 8 hours PRN Nausea  oxyCODONE    IR 10 milliGRAM(s) Oral every 4 hours PRN Severe Pain (7 - 10)  oxyCODONE    IR 5 milliGRAM(s) Oral every 4 hours PRN Moderate Pain (4 - 6)      Allergies    aspirin (Hives; Swelling)  ibuprofen (Rash; Flushing; Hives)  lidocaine (Short breath)    Intolerances        Vital Signs Last 24 Hrs  T(C): 37.3 (24 Nov 2017 08:59), Max: 38.7 (24 Nov 2017 06:07)  T(F): 99.2 (24 Nov 2017 08:59), Max: 101.6 (24 Nov 2017 06:07)  HR: 73 (24 Nov 2017 08:59) (73 - 98)  BP: 100/57 (24 Nov 2017 08:59) (100/57 - 104/55)  BP(mean): --  RR: 18 (24 Nov 2017 08:59) (18 - 20)  SpO2: 96% (24 Nov 2017 08:59) (96% - 97%)      PHYSICAL EXAM:    General: Well developed; well nourished; in no acute distress  HEENT: MMM, conjunctiva and sclera clear  Gastrointestinal: Soft non-tender non-distended; Normal bowel sounds; No hepatosplenomegaly  Skin: Warm and dry. No obvious rash    LABS:      CBC Full  -  ( 24 Nov 2017 07:48 )  WBC Count : 11.0 K/uL  Hemoglobin : 12.4 g/dL  Hematocrit : 36.4 %  Platelet Count - Automated : 305 K/uL  Mean Cell Volume : 85.0 fL  Mean Cell Hemoglobin : 29.0 pg  Mean Cell Hemoglobin Concentration : 34.1 g/dL  Auto Neutrophil # : x  Auto Lymphocyte # : x  Auto Monocyte # : x  Auto Eosinophil # : x  Auto Basophil # : x  Auto Neutrophil % : x  Auto Lymphocyte % : x  Auto Monocyte % : x  Auto Eosinophil % : x  Auto Basophil % : x    11-24    132<L>  |  95<L>  |  15  ----------------------------<  147<H>  3.9   |  23  |  0.64    Ca    8.7      24 Nov 2017 07:48  Mg     2.3     11-24    TPro  6.1  /  Alb  3.3  /  TBili  0.4  /  DBili  x   /  AST  19  /  ALT  75<H>  /  AlkPhos  38<L>  11-24                      RADIOLOGY & ADDITIONAL STUDIES (The following images were personally reviewed):

## 2017-11-25 LAB
ALBUMIN SERPL ELPH-MCNC: 3.2 G/DL — LOW (ref 3.3–5)
ALP SERPL-CCNC: 36 U/L — LOW (ref 40–120)
ALT FLD-CCNC: 50 U/L — HIGH (ref 10–45)
ANION GAP SERPL CALC-SCNC: 13 MMOL/L — SIGNIFICANT CHANGE UP (ref 5–17)
AST SERPL-CCNC: 10 U/L — SIGNIFICANT CHANGE UP (ref 10–40)
BILIRUB SERPL-MCNC: 0.4 MG/DL — SIGNIFICANT CHANGE UP (ref 0.2–1.2)
BUN SERPL-MCNC: 16 MG/DL — SIGNIFICANT CHANGE UP (ref 7–23)
CALCIUM SERPL-MCNC: 8.6 MG/DL — SIGNIFICANT CHANGE UP (ref 8.4–10.5)
CHLORIDE SERPL-SCNC: 96 MMOL/L — SIGNIFICANT CHANGE UP (ref 96–108)
CO2 SERPL-SCNC: 24 MMOL/L — SIGNIFICANT CHANGE UP (ref 22–31)
CREAT SERPL-MCNC: 0.62 MG/DL — SIGNIFICANT CHANGE UP (ref 0.5–1.3)
FERRITIN SERPL-MCNC: 157.3 NG/ML — HIGH (ref 15–150)
GLUCOSE BLDC GLUCOMTR-MCNC: 116 MG/DL — HIGH (ref 70–99)
GLUCOSE BLDC GLUCOMTR-MCNC: 162 MG/DL — HIGH (ref 70–99)
GLUCOSE BLDC GLUCOMTR-MCNC: 199 MG/DL — HIGH (ref 70–99)
GLUCOSE SERPL-MCNC: 141 MG/DL — HIGH (ref 70–99)
HCT VFR BLD CALC: 38.1 % — SIGNIFICANT CHANGE UP (ref 34.5–45)
HGB BLD-MCNC: 12.8 G/DL — SIGNIFICANT CHANGE UP (ref 11.5–15.5)
INTERPRETATION SERPL IFE-IMP: SIGNIFICANT CHANGE UP
MAGNESIUM SERPL-MCNC: 2.3 MG/DL — SIGNIFICANT CHANGE UP (ref 1.6–2.6)
MCHC RBC-ENTMCNC: 29 PG — SIGNIFICANT CHANGE UP (ref 27–34)
MCHC RBC-ENTMCNC: 33.6 G/DL — SIGNIFICANT CHANGE UP (ref 32–36)
MCV RBC AUTO: 86.2 FL — SIGNIFICANT CHANGE UP (ref 80–100)
PLATELET # BLD AUTO: 312 K/UL — SIGNIFICANT CHANGE UP (ref 150–400)
POTASSIUM SERPL-MCNC: 4.1 MMOL/L — SIGNIFICANT CHANGE UP (ref 3.5–5.3)
POTASSIUM SERPL-SCNC: 4.1 MMOL/L — SIGNIFICANT CHANGE UP (ref 3.5–5.3)
PROT SERPL-MCNC: 5.9 G/DL — LOW (ref 6–8.3)
RBC # BLD: 4.42 M/UL — SIGNIFICANT CHANGE UP (ref 3.8–5.2)
RBC # FLD: 12.2 % — SIGNIFICANT CHANGE UP (ref 10.3–16.9)
SODIUM SERPL-SCNC: 133 MMOL/L — LOW (ref 135–145)
WBC # BLD: 11.6 K/UL — HIGH (ref 3.8–10.5)
WBC # FLD AUTO: 11.6 K/UL — HIGH (ref 3.8–10.5)

## 2017-11-25 PROCEDURE — 93010 ELECTROCARDIOGRAM REPORT: CPT

## 2017-11-25 RX ADMIN — Medication 4 MILLIGRAM(S): at 20:33

## 2017-11-25 RX ADMIN — Medication 650 MILLIGRAM(S): at 02:13

## 2017-11-25 RX ADMIN — Medication 25 MILLIGRAM(S): at 20:38

## 2017-11-25 RX ADMIN — POLYETHYLENE GLYCOL 3350 17 GRAM(S): 17 POWDER, FOR SOLUTION ORAL at 07:11

## 2017-11-25 RX ADMIN — OXYCODONE HYDROCHLORIDE 5 MILLIGRAM(S): 5 TABLET ORAL at 13:13

## 2017-11-25 RX ADMIN — VORICONAZOLE 250 MILLIGRAM(S): 10 INJECTION, POWDER, LYOPHILIZED, FOR SOLUTION INTRAVENOUS at 11:22

## 2017-11-25 RX ADMIN — Medication 5 MILLIGRAM(S): at 07:55

## 2017-11-25 RX ADMIN — Medication 25 MILLIGRAM(S): at 02:23

## 2017-11-25 RX ADMIN — OXYCODONE HYDROCHLORIDE 5 MILLIGRAM(S): 5 TABLET ORAL at 08:46

## 2017-11-25 RX ADMIN — Medication 5 MILLIGRAM(S): at 02:19

## 2017-11-25 RX ADMIN — POLYETHYLENE GLYCOL 3350 17 GRAM(S): 17 POWDER, FOR SOLUTION ORAL at 17:26

## 2017-11-25 RX ADMIN — Medication 2: at 22:17

## 2017-11-25 RX ADMIN — Medication 2: at 17:38

## 2017-11-25 RX ADMIN — Medication 650 MILLIGRAM(S): at 17:26

## 2017-11-25 RX ADMIN — OXYCODONE HYDROCHLORIDE 5 MILLIGRAM(S): 5 TABLET ORAL at 14:13

## 2017-11-25 RX ADMIN — VORICONAZOLE 250 MILLIGRAM(S): 10 INJECTION, POWDER, LYOPHILIZED, FOR SOLUTION INTRAVENOUS at 22:18

## 2017-11-25 RX ADMIN — PANTOPRAZOLE SODIUM 40 MILLIGRAM(S): 20 TABLET, DELAYED RELEASE ORAL at 07:11

## 2017-11-25 RX ADMIN — OXYCODONE HYDROCHLORIDE 5 MILLIGRAM(S): 5 TABLET ORAL at 09:46

## 2017-11-25 RX ADMIN — SENNA PLUS 2 TABLET(S): 8.6 TABLET ORAL at 22:17

## 2017-11-25 RX ADMIN — Medication 4 MILLIGRAM(S): at 13:05

## 2017-11-25 NOTE — PROGRESS NOTE ADULT - PROBLEM SELECTOR PLAN 3
F: no IVF  E: Replete PRN  N: Reg diet  PPX: No VTE ppx indicated, IMPROVE score 0; Droplet precautions    Full Code  DISPO: RMF, pending transfer to [place]. F: no IVF  E: Replete PRN  N: Reg diet  PPX: No VTE ppx indicated, IMPROVE score 0    Full Code  DISPO: Pending clinical improvement.

## 2017-11-25 NOTE — PROGRESS NOTE ADULT - SUBJECTIVE AND OBJECTIVE BOX
Pt seen and examined  coverage for Dr. Lopez  Still spiking temps.  headache worse today, denies photophobia    REVIEW OF SYSTEMS:  Constitutional: No fever, weight loss or fatigue  Cardiovascular: No chest pain, palpitations, dizziness or leg swelling  Gastrointestinal: No abdominal or epigastric pain. No nausea, vomiting or hematemesis; No diarrhea or constipation. No melena or hematochezia.  Skin: No itching, burning, rashes or lesions       MEDICATIONS:  MEDICATIONS  (STANDING):  dexamethasone     Tablet   Oral   dexamethasone     Tablet 4 milliGRAM(s) Oral every 6 hours  dextrose 5%. 1000 milliLiter(s) (50 mL/Hr) IV Continuous <Continuous>  dextrose 50% Injectable 12.5 Gram(s) IV Push once  dextrose 50% Injectable 25 Gram(s) IV Push once  dextrose 50% Injectable 25 Gram(s) IV Push once  influenza   Vaccine 0.5 milliLiter(s) IntraMuscular once  insulin lispro (HumaLOG) corrective regimen sliding scale   SubCutaneous Before meals and at bedtime  pantoprazole    Tablet 40 milliGRAM(s) Oral before breakfast  polyethylene glycol 3350 17 Gram(s) Oral every 12 hours  senna 2 Tablet(s) Oral at bedtime  voriconazole 250 milliGRAM(s) Oral every 12 hours    MEDICATIONS  (PRN):  acetaminophen   Tablet 650 milliGRAM(s) Oral every 6 hours PRN For Temp greater than 38 C (100.4 F)  acetaminophen   Tablet. 650 milliGRAM(s) Oral every 6 hours PRN Mild Pain (1 - 3)  acetaminophen 325 mG/butalbital 50 mG/caffeine 40 mG 1 Tablet(s) Oral once PRN HA  aluminum hydroxide/magnesium hydroxide/simethicone Suspension 30 milliLiter(s) Oral every 4 hours PRN Dyspepsia  dextrose Gel 1 Dose(s) Oral once PRN Blood Glucose LESS THAN 70 milliGRAM(s)/deciliter  diphenhydrAMINE   Capsule 25 milliGRAM(s) Oral at bedtime PRN Rash and/or Itching  glucagon  Injectable 1 milliGRAM(s) IntraMuscular once PRN Glucose LESS THAN 70 milligrams/deciliter  ondansetron    Tablet 4 milliGRAM(s) Oral every 8 hours PRN Nausea  oxyCODONE    IR 10 milliGRAM(s) Oral every 4 hours PRN Severe Pain (7 - 10)  oxyCODONE    IR 5 milliGRAM(s) Oral every 4 hours PRN Moderate Pain (4 - 6)      Allergies    aspirin (Hives; Swelling)  ibuprofen (Rash; Flushing; Hives)  lidocaine (Short breath)    Intolerances        Vital Signs Last 24 Hrs  T(C): 37.5 (25 Nov 2017 08:51), Max: 39.2 (25 Nov 2017 02:03)  T(F): 99.5 (25 Nov 2017 08:51), Max: 102.5 (25 Nov 2017 02:03)  HR: 99 (25 Nov 2017 08:51) (71 - 99)  BP: 102/67 (25 Nov 2017 08:51) (99/61 - 107/93)  BP(mean): --  RR: 17 (25 Nov 2017 08:51) (17 - 20)  SpO2: 97% (25 Nov 2017 08:51) (96% - 99%)      PHYSICAL EXAM:    General: Well developed; well nourished; in no acute distress  HEENT: MMM, conjunctiva and sclera clear  Lungs: clear  Heart: regular  Gastrointestinal: Soft non-tender non-distended; Normal bowel sounds; No hepatosplenomegaly  Skin: Warm and dry. No obvious rash  Neuro: no focal findings  LABS:      CBC Full  -  ( 25 Nov 2017 07:37 )  WBC Count : 11.6 K/uL  Hemoglobin : 12.8 g/dL  Hematocrit : 38.1 %  Platelet Count - Automated : 312 K/uL  Mean Cell Volume : 86.2 fL  Mean Cell Hemoglobin : 29.0 pg  Mean Cell Hemoglobin Concentration : 33.6 g/dL  Auto Neutrophil # : x  Auto Lymphocyte # : x  Auto Monocyte # : x  Auto Eosinophil # : x  Auto Basophil # : x  Auto Neutrophil % : x  Auto Lymphocyte % : x  Auto Monocyte % : x  Auto Eosinophil % : x  Auto Basophil % : x    11-25    133<L>  |  96  |  16  ----------------------------<  141<H>  4.1   |  24  |  0.62    Ca    8.6      25 Nov 2017 07:37  Mg     2.3     11-25    TPro  5.9<L>  /  Alb  3.2<L>  /  TBili  0.4  /  DBili  x   /  AST  10  /  ALT  50<H>  /  AlkPhos  36<L>  11-25                      RADIOLOGY & ADDITIONAL STUDIES (The following images were personally reviewed):

## 2017-11-25 NOTE — PROGRESS NOTE ADULT - SUBJECTIVE AND OBJECTIVE BOX
INTERVAL EVENTS:        Vital Signs Last 12 Hrs  T(F): 99.8 (11-25-17 @ 05:06), Max: 102.5 (11-25-17 @ 02:03)  HR: 71 (11-25-17 @ 05:06) (71 - 86)  BP: 99/61 (11-25-17 @ 05:06) (99/61 - 104/68)  BP(mean): --  RR: 17 (11-25-17 @ 05:06) (17 - 19)  SpO2: 97% (11-25-17 @ 05:06) (96% - 97%)  I&O's Summary      PHYSICAL EXAM:    General: WDWN, resting in bed comfortably, NAD.  HEENT: NC/AT; PERRL, clear conjunctiva. No photophobia.  Neck: Minimal neck stiffness.  Respiratory: CTA b/l. No w/r/r.   Cardiovascular: +S1/S2; RRR. No m/r/g.  Abdomen: soft, NT/ND; +BS x4  Extremities: WWP, 2+ peripheral pulses b/l; no LE edema  Skin: normal color and turgor; no rash  Neurological: AAOx3. EOMI. No focal deficits. Strength/sensation 5/5 throughout. Hyperreflexia lower extremity bilaterally. Babinski negative. Mildly +Kernig. Mildly +Brudzinski.        LABS:                        12.4   11.0  )-----------( 305      ( 24 Nov 2017 07:48 )             36.4     11-24    132<L>  |  95<L>  |  15  ----------------------------<  147<H>  3.9   |  23  |  0.64    Ca    8.7      24 Nov 2017 07:48  Mg     2.3     11-24    TPro  6.1  /  Alb  3.3  /  TBili  0.4  /  DBili  x   /  AST  19  /  ALT  75<H>  /  AlkPhos  38<L>  11-24          RADIOLOGY & ADDITIONAL TESTS:    MEDICATIONS  (STANDING):  dexamethasone     Tablet 5 milliGRAM(s) Oral every 6 hours  dexamethasone     Tablet   Oral   dexamethasone     Tablet 4 milliGRAM(s) Oral every 6 hours  dextrose 5%. 1000 milliLiter(s) (50 mL/Hr) IV Continuous <Continuous>  dextrose 50% Injectable 12.5 Gram(s) IV Push once  dextrose 50% Injectable 25 Gram(s) IV Push once  dextrose 50% Injectable 25 Gram(s) IV Push once  influenza   Vaccine 0.5 milliLiter(s) IntraMuscular once  insulin lispro (HumaLOG) corrective regimen sliding scale   SubCutaneous Before meals and at bedtime  pantoprazole    Tablet 40 milliGRAM(s) Oral before breakfast  polyethylene glycol 3350 17 Gram(s) Oral every 12 hours  senna 2 Tablet(s) Oral at bedtime  voriconazole 250 milliGRAM(s) Oral every 12 hours    MEDICATIONS  (PRN):  acetaminophen   Tablet 650 milliGRAM(s) Oral every 6 hours PRN For Temp greater than 38 C (100.4 F)  acetaminophen   Tablet. 650 milliGRAM(s) Oral every 6 hours PRN Mild Pain (1 - 3)  acetaminophen 325 mG/butalbital 50 mG/caffeine 40 mG 1 Tablet(s) Oral once PRN HA  aluminum hydroxide/magnesium hydroxide/simethicone Suspension 30 milliLiter(s) Oral every 4 hours PRN Dyspepsia  dextrose Gel 1 Dose(s) Oral once PRN Blood Glucose LESS THAN 70 milliGRAM(s)/deciliter  diphenhydrAMINE   Capsule 25 milliGRAM(s) Oral at bedtime PRN Rash and/or Itching  glucagon  Injectable 1 milliGRAM(s) IntraMuscular once PRN Glucose LESS THAN 70 milligrams/deciliter  ondansetron    Tablet 4 milliGRAM(s) Oral every 8 hours PRN Nausea  oxyCODONE    IR 10 milliGRAM(s) Oral every 4 hours PRN Severe Pain (7 - 10)  oxyCODONE    IR 5 milliGRAM(s) Oral every 4 hours PRN Moderate Pain (4 - 6) INTERVAL EVENTS: Patient seen and examined at bedside. No acute events overnight. Patient with T spike to 102.5 early this morning, received Tylenol with improvement. Also admits to bilateral, pounding headaches. Reports improvement in her photophobia and nuchal rigidity. Denies N/V/Weakness/CP/SOB. ROS otherwise negative.        Vital Signs Last 12 Hrs  T(F): 99.8 (11-25-17 @ 05:06), Max: 102.5 (11-25-17 @ 02:03)  HR: 71 (11-25-17 @ 05:06) (71 - 86)  BP: 99/61 (11-25-17 @ 05:06) (99/61 - 104/68)  BP(mean): --  RR: 17 (11-25-17 @ 05:06) (17 - 19)  SpO2: 97% (11-25-17 @ 05:06) (96% - 97%)  I&O's Summary      PHYSICAL EXAM:    General: WDWN, resting in bed comfortably, NAD.  HEENT: NC/AT; PERRL, clear conjunctiva. No photophobia.  Neck: Minimal neck stiffness.  Respiratory: CTA b/l. No w/r/r.   Cardiovascular: +S1/S2; RRR. No m/r/g.  Abdomen: soft, NT/ND; +BS x4  Extremities: WWP, 2+ peripheral pulses b/l; no LE edema  Skin: normal color and turgor; no rash  Neurological: AAOx3. EOMI. No focal deficits. Strength/sensation 5/5 throughout. Hyperreflexia lower extremity bilaterally. Babinski negative. Mildly +Kernig. Mildly +Brudzinski.        LABS:                        12.4   11.0  )-----------( 305      ( 24 Nov 2017 07:48 )             36.4     11-24    132<L>  |  95<L>  |  15  ----------------------------<  147<H>  3.9   |  23  |  0.64    Ca    8.7      24 Nov 2017 07:48  Mg     2.3     11-24    TPro  6.1  /  Alb  3.3  /  TBili  0.4  /  DBili  x   /  AST  19  /  ALT  75<H>  /  AlkPhos  38<L>  11-24          RADIOLOGY & ADDITIONAL TESTS:    MEDICATIONS  (STANDING):  dexamethasone     Tablet 5 milliGRAM(s) Oral every 6 hours  dexamethasone     Tablet   Oral   dexamethasone     Tablet 4 milliGRAM(s) Oral every 6 hours  dextrose 5%. 1000 milliLiter(s) (50 mL/Hr) IV Continuous <Continuous>  dextrose 50% Injectable 12.5 Gram(s) IV Push once  dextrose 50% Injectable 25 Gram(s) IV Push once  dextrose 50% Injectable 25 Gram(s) IV Push once  influenza   Vaccine 0.5 milliLiter(s) IntraMuscular once  insulin lispro (HumaLOG) corrective regimen sliding scale   SubCutaneous Before meals and at bedtime  pantoprazole    Tablet 40 milliGRAM(s) Oral before breakfast  polyethylene glycol 3350 17 Gram(s) Oral every 12 hours  senna 2 Tablet(s) Oral at bedtime  voriconazole 250 milliGRAM(s) Oral every 12 hours    MEDICATIONS  (PRN):  acetaminophen   Tablet 650 milliGRAM(s) Oral every 6 hours PRN For Temp greater than 38 C (100.4 F)  acetaminophen   Tablet. 650 milliGRAM(s) Oral every 6 hours PRN Mild Pain (1 - 3)  acetaminophen 325 mG/butalbital 50 mG/caffeine 40 mG 1 Tablet(s) Oral once PRN HA  aluminum hydroxide/magnesium hydroxide/simethicone Suspension 30 milliLiter(s) Oral every 4 hours PRN Dyspepsia  dextrose Gel 1 Dose(s) Oral once PRN Blood Glucose LESS THAN 70 milliGRAM(s)/deciliter  diphenhydrAMINE   Capsule 25 milliGRAM(s) Oral at bedtime PRN Rash and/or Itching  glucagon  Injectable 1 milliGRAM(s) IntraMuscular once PRN Glucose LESS THAN 70 milligrams/deciliter  ondansetron    Tablet 4 milliGRAM(s) Oral every 8 hours PRN Nausea  oxyCODONE    IR 10 milliGRAM(s) Oral every 4 hours PRN Severe Pain (7 - 10)  oxyCODONE    IR 5 milliGRAM(s) Oral every 4 hours PRN Moderate Pain (4 - 6)

## 2017-11-25 NOTE — PROGRESS NOTE ADULT - PROBLEM SELECTOR PLAN 1
Pt with previously negative workup, now with PMN predominance on CSF and s/s of meningitis. Pt w/Tmax 103.5 and LP s/f neutrophilic predominance, suggestive of bacterial etiology; however, CSF PCR panel, CSF bacterial cultures and gram stain here and from [facility] are neg.  - Decadron taper 5-4-3-2-1. Daily.  - All medications PO now, IV holiday. No IV access currently.  - Dr. Osborne following for pain management, appreciate recs.  - Dr. Miller following and coordinating outside lab studies.  - BCx and CSF bacteria cultures, fungitell negative NGTD

## 2017-11-25 NOTE — PROGRESS NOTE ADULT - ASSESSMENT
32F w/PMH lumbar disk herniation s/p epidural injection 8/17 and recent neg w/u for similar presentation at [facility] (11/6) and [facility] (11/14) presenting with worsening headache, photophobia, neck stiffness, myalgias, fever, weakness, bl shooting back pain, now with PMN predominance on LP but no identified organisms on GS and negative PCR, concerning for infectious meningitis, but cultures NGTD. PCR negative. Many CSF studies still pending. Negative so far. Gallium scan negative. Patient reports continued fevers and chills and hallucinations after initiation of voriconazole.     It is possible that this patient does have a fungal meningitis as fungal cultures take a long time to grow although fungitell negative, bacterial is less likely as the cultures have been negative. I believe that the patient has a viral meningitis which has been slow to resolve. The fevers do persist, but clinically the patient is getting better with improvement in her overall meningeal symptoms. Continues to improve.

## 2017-11-26 LAB
ANION GAP SERPL CALC-SCNC: 12 MMOL/L — SIGNIFICANT CHANGE UP (ref 5–17)
BASOPHILS NFR BLD AUTO: 0.1 % — SIGNIFICANT CHANGE UP (ref 0–2)
BUN SERPL-MCNC: 15 MG/DL — SIGNIFICANT CHANGE UP (ref 7–23)
CALCIUM SERPL-MCNC: 8.5 MG/DL — SIGNIFICANT CHANGE UP (ref 8.4–10.5)
CHLORIDE SERPL-SCNC: 93 MMOL/L — LOW (ref 96–108)
CO2 SERPL-SCNC: 28 MMOL/L — SIGNIFICANT CHANGE UP (ref 22–31)
CREAT SERPL-MCNC: 0.66 MG/DL — SIGNIFICANT CHANGE UP (ref 0.5–1.3)
CULTURE RESULTS: SIGNIFICANT CHANGE UP
EBV EA AB SER IA-ACNC: <5 U/ML — SIGNIFICANT CHANGE UP
EBV EA AB TITR SER IF: NEGATIVE — SIGNIFICANT CHANGE UP
EBV EA IGG SER-ACNC: NEGATIVE — SIGNIFICANT CHANGE UP
EBV NA IGG SER IA-ACNC: 7.1 U/ML — SIGNIFICANT CHANGE UP
EBV PATRN SPEC IB-IMP: SIGNIFICANT CHANGE UP
EBV VCA IGG AVIDITY SER QL IA: POSITIVE
EBV VCA IGM SER IA-ACNC: 60.2 U/ML — HIGH
EBV VCA IGM SER IA-ACNC: <10 U/ML — SIGNIFICANT CHANGE UP
EBV VCA IGM TITR FLD: NEGATIVE — SIGNIFICANT CHANGE UP
GLUCOSE BLDC GLUCOMTR-MCNC: 127 MG/DL — HIGH (ref 70–99)
GLUCOSE BLDC GLUCOMTR-MCNC: 154 MG/DL — HIGH (ref 70–99)
GLUCOSE BLDC GLUCOMTR-MCNC: 156 MG/DL — HIGH (ref 70–99)
GLUCOSE BLDC GLUCOMTR-MCNC: 184 MG/DL — HIGH (ref 70–99)
GLUCOSE SERPL-MCNC: 131 MG/DL — HIGH (ref 70–99)
HCT VFR BLD CALC: 39 % — SIGNIFICANT CHANGE UP (ref 34.5–45)
HGB BLD-MCNC: 13.4 G/DL — SIGNIFICANT CHANGE UP (ref 11.5–15.5)
LYMPHOCYTES # BLD AUTO: 6.6 % — LOW (ref 13–44)
LYMPHOCYTES # BLD AUTO: 7 % — LOW (ref 13–44)
MAGNESIUM SERPL-MCNC: 2.3 MG/DL — SIGNIFICANT CHANGE UP (ref 1.6–2.6)
MANUAL DIF COMMENT BLD-IMP: SIGNIFICANT CHANGE UP
MANUAL SMEAR VERIFICATION: SIGNIFICANT CHANGE UP
MCHC RBC-ENTMCNC: 29.6 PG — SIGNIFICANT CHANGE UP (ref 27–34)
MCHC RBC-ENTMCNC: 34.4 G/DL — SIGNIFICANT CHANGE UP (ref 32–36)
MCV RBC AUTO: 86.3 FL — SIGNIFICANT CHANGE UP (ref 80–100)
MONOCYTES NFR BLD AUTO: 6.8 % — SIGNIFICANT CHANGE UP (ref 2–14)
MONOCYTES NFR BLD AUTO: 9 % — SIGNIFICANT CHANGE UP (ref 2–14)
NEUTROPHILS NFR BLD AUTO: 83 % — HIGH (ref 43–77)
NEUTROPHILS NFR BLD AUTO: 86.5 % — HIGH (ref 43–77)
NEUTS BAND # BLD: 1 % — SIGNIFICANT CHANGE UP
PLAT MORPH BLD: NORMAL — SIGNIFICANT CHANGE UP
PLATELET # BLD AUTO: 335 K/UL — SIGNIFICANT CHANGE UP (ref 150–400)
POTASSIUM SERPL-MCNC: 3.9 MMOL/L — SIGNIFICANT CHANGE UP (ref 3.5–5.3)
POTASSIUM SERPL-SCNC: 3.9 MMOL/L — SIGNIFICANT CHANGE UP (ref 3.5–5.3)
RBC # BLD: 4.52 M/UL — SIGNIFICANT CHANGE UP (ref 3.8–5.2)
RBC # FLD: 12.4 % — SIGNIFICANT CHANGE UP (ref 10.3–16.9)
RBC BLD AUTO: NORMAL — SIGNIFICANT CHANGE UP
SODIUM SERPL-SCNC: 133 MMOL/L — LOW (ref 135–145)
SPECIMEN SOURCE: SIGNIFICANT CHANGE UP
VORICONAZOLE SERPL-MCNC: 2.2 MCG/ML — SIGNIFICANT CHANGE UP (ref 1–5.5)
WBC # BLD: 18.9 K/UL — HIGH (ref 3.8–10.5)
WBC # FLD AUTO: 18.9 K/UL — HIGH (ref 3.8–10.5)

## 2017-11-26 RX ORDER — POLYETHYLENE GLYCOL 3350 17 G/17G
17 POWDER, FOR SOLUTION ORAL DAILY
Qty: 0 | Refills: 0 | Status: DISCONTINUED | OUTPATIENT
Start: 2017-11-26 | End: 2017-11-26

## 2017-11-26 RX ADMIN — VORICONAZOLE 250 MILLIGRAM(S): 10 INJECTION, POWDER, LYOPHILIZED, FOR SOLUTION INTRAVENOUS at 22:14

## 2017-11-26 RX ADMIN — OXYCODONE HYDROCHLORIDE 5 MILLIGRAM(S): 5 TABLET ORAL at 14:43

## 2017-11-26 RX ADMIN — Medication 2: at 16:57

## 2017-11-26 RX ADMIN — OXYCODONE HYDROCHLORIDE 10 MILLIGRAM(S): 5 TABLET ORAL at 00:43

## 2017-11-26 RX ADMIN — OXYCODONE HYDROCHLORIDE 5 MILLIGRAM(S): 5 TABLET ORAL at 13:43

## 2017-11-26 RX ADMIN — Medication 4 MILLIGRAM(S): at 02:05

## 2017-11-26 RX ADMIN — POLYETHYLENE GLYCOL 3350 17 GRAM(S): 17 POWDER, FOR SOLUTION ORAL at 17:01

## 2017-11-26 RX ADMIN — OXYCODONE HYDROCHLORIDE 10 MILLIGRAM(S): 5 TABLET ORAL at 01:15

## 2017-11-26 RX ADMIN — Medication 25 MILLIGRAM(S): at 22:14

## 2017-11-26 RX ADMIN — Medication 2: at 22:14

## 2017-11-26 RX ADMIN — Medication 3 MILLIGRAM(S): at 20:03

## 2017-11-26 RX ADMIN — VORICONAZOLE 250 MILLIGRAM(S): 10 INJECTION, POWDER, LYOPHILIZED, FOR SOLUTION INTRAVENOUS at 11:23

## 2017-11-26 RX ADMIN — Medication 10 MILLIGRAM(S): at 14:46

## 2017-11-26 RX ADMIN — POLYETHYLENE GLYCOL 3350 17 GRAM(S): 17 POWDER, FOR SOLUTION ORAL at 06:14

## 2017-11-26 RX ADMIN — Medication 650 MILLIGRAM(S): at 02:34

## 2017-11-26 RX ADMIN — Medication 650 MILLIGRAM(S): at 16:58

## 2017-11-26 RX ADMIN — PANTOPRAZOLE SODIUM 40 MILLIGRAM(S): 20 TABLET, DELAYED RELEASE ORAL at 06:14

## 2017-11-26 RX ADMIN — Medication 3 MILLIGRAM(S): at 13:45

## 2017-11-26 RX ADMIN — Medication 4 MILLIGRAM(S): at 07:31

## 2017-11-26 RX ADMIN — SENNA PLUS 2 TABLET(S): 8.6 TABLET ORAL at 22:14

## 2017-11-26 NOTE — PROGRESS NOTE ADULT - ASSESSMENT
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  No apprent rectivation of EBV  Pruritus - possibly evolving allergic reaction  Patient at risk for Cdiff but, so far, no clinical evidence    RECOMMEND  Continue current dose of Voriconazole for now  Neuro follow-up  Recheck CBC with diff  Monitor for emergence of rash  Hope for the results from [facility] about fungal testing within next 1-2 days.    Please call about CSF viral cultures after the weekend  Further viral molecular diagnostics - ?with the [facility] and possibly research lab      I will be away 11/27 - 12/1.  Dr Anna Kay will cover  Dr Cheng here only part of the week

## 2017-11-26 NOTE — PROGRESS NOTE ADULT - SUBJECTIVE AND OBJECTIVE BOX
NEUROSURGERY PAIN MANAGEMENT PROGRESS NOTE    OVERNIGHT EVENTS:  - Pt reporting worsening of headache since down-titration of steroids. Sig. increase   - Pt continues to be febrile, w/ new Leukocytosis w/o left shift  - ID workup still underway:   - Fungal, AFB, and viral cultures of CSF pending, Fungal PCR pending    PLAN/RECOMMENDATIONS:  - Continue with Oxy-IR dosing, if persistent pain may consider addition of Oxy-ER tomorrow, if breakthrough pain, 1x Dilaudid 0.5mg IVP dosing, if >1hr since Oxy-IR admin and no relief  - Addition of Robaxin 500mg TID dosing (scheduled for now), change to PRN if sig. drowsiness after first dosing.   - Sig. issues of constipation with opioids, but had BM this AM, if continues to be an issue in next 2 days, will order Movantik, in stock with pharmacy  - Call NP if any questions with plan [phone]      HPI:  32[place] PMH of lumbar disc herniation (s/p epidural injection on 8/17) who was at [facility] on 11/13 for severe HA partially relieved by lying down, neck pain, fever, weakness, myalgias, back pain radiating to bl thighs.     Pt reports first experiencing similar symptoms 10 days ago (went to [facility] in [place] - neg w/u: LP, lumbar MRI w/contr, head CT, West Nile virus - discharged w/supportive tx + fiorocet).     Pt then went to Dr Carty and was sent to ED.  At [facility], neg MRI brain, neg RVP, neg BCx, neg HIV, evaluated by ID and Neuro - discharged home and felt better until 11/14 in pm - worsening of headache, photophobia and neck stiffness. She denied any other symptoms.  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   Denied: recent travel, IV drug use, unprotected intercourse, any rashes or bites, invasive procedures other than epidural injection in 08/2017.  In the ER, pt s/p IV hydromorphone and IVF, w/partial relief of headache; neck stiffness, photophobia, chills. ROS otherwise negative. Requesting food.  VS in ED: T(F): 101.5  HR: 108  BP: 89/57 RR: 16 SpO2: 98% (15 Nov 2017 19:50)      PAST MEDICAL & SURGICAL HISTORY:  Lumbar disc herniation  No significant past surgical history      FAMILY HISTORY:  No pertinent family history in first degree relatives      Allergies    aspirin (Hives; Swelling)  ibuprofen (Rash; Flushing; Hives)  lidocaine (Short breath)    Intolerances        PAIN MEDICATIONS:  acetaminophen   Tablet 650 milliGRAM(s) Oral every 6 hours PRN  acetaminophen   Tablet. 650 milliGRAM(s) Oral every 6 hours PRN  acetaminophen 325 mG/butalbital 50 mG/caffeine 40 mG 1 Tablet(s) Oral once PRN  diphenhydrAMINE   Capsule 25 milliGRAM(s) Oral at bedtime PRN  ondansetron    Tablet 4 milliGRAM(s) Oral every 8 hours PRN  oxyCODONE    IR 10 milliGRAM(s) Oral every 4 hours PRN  oxyCODONE    IR 5 milliGRAM(s) Oral every 4 hours PRN    Heme:    Antibiotics:  voriconazole 250 milliGRAM(s) Oral every 12 hours    Cardiovascular:    GI:  aluminum hydroxide/magnesium hydroxide/simethicone Suspension 30 milliLiter(s) Oral every 4 hours PRN  bisacodyl Suppository 10 milliGRAM(s) Rectal daily PRN  pantoprazole    Tablet 40 milliGRAM(s) Oral before breakfast  polyethylene glycol 3350 17 Gram(s) Oral every 12 hours  senna 2 Tablet(s) Oral at bedtime    Endocrine:  dexamethasone     Tablet   Oral   dexamethasone     Tablet 3 milliGRAM(s) Oral every 6 hours  dextrose 50% Injectable 12.5 Gram(s) IV Push once  dextrose 50% Injectable 25 Gram(s) IV Push once  dextrose 50% Injectable 25 Gram(s) IV Push once  dextrose Gel 1 Dose(s) Oral once PRN  glucagon  Injectable 1 milliGRAM(s) IntraMuscular once PRN  insulin lispro (HumaLOG) corrective regimen sliding scale   SubCutaneous Before meals and at bedtime    All Other Medications:  dextrose 5%. 1000 milliLiter(s) IV Continuous <Continuous>  influenza   Vaccine 0.5 milliLiter(s) IntraMuscular once      Vital Signs Last 24 Hrs  T(C): 39.1 (26 Nov 2017 16:35), Max: 39.4 (26 Nov 2017 02:23)  T(F): 102.4 (26 Nov 2017 16:35), Max: 103 (26 Nov 2017 02:23)  HR: 102 (26 Nov 2017 16:35) (76 - 102)  BP: 110/73 (26 Nov 2017 16:35) (96/54 - 115/69)  BP(mean): --  RR: 18 (26 Nov 2017 16:35) (16 - 18)  SpO2: 95% (26 Nov 2017 16:35) (93% - 98%)    LABS:                        13.4   18.9  )-----------( 335      ( 26 Nov 2017 07:36 )             39.0     11-26    133<L>  |  93<L>  |  15  ----------------------------<  131<H>  3.9   |  28  |  0.66    Ca    8.5      26 Nov 2017 07:36  Mg     2.3     11-26    TPro  5.9<L>  /  Alb  3.2<L>  /  TBili  0.4  /  DBili  x   /  AST  10  /  ALT  50<H>  /  AlkPhos  36<L>  11-25          REVIEW OF SYSTEMS:  CONSTITUTIONAL: Persistent fever overnight.   EYES: Cont associated periorbital eye pain, denies visual disturbances  ENMT:  No difficulty hearing. No throat pain  NECK: Severe stiffness & neck pain   RESPIRATORY: No cough, wheezing; No shortness of breath  CARDIOVASCULAR: No chest pain, palpitations.   GASTROINTESTINAL: Pt reports passing gas. Issues with constipation with opioids, today finally had bowel movement. No abdominal or epigastric pain. No nausea, vomiting.   GENITOURINARY: No dysuria, frequency, or incontinence.  NEUROLOGICAL: Severe sig. worsening headaches, No loss of strength, no numbness, or tremors. No dizziness or lightheadedness with pain medications. Sensation of clouded feeling with medications.   MUSCULOSKELETAL: Denies severity of lumbar back pain. w/ radicular pain. C/o sig. muscle stiffness/cramping in neck, sig. nucal rigidity. No joint pain or swelling.  INTEGUMENTARY: C/o pruritus, no active rash.    FUNCTIONAL ASSESSMENT:  PAIN SCORE AT REST:    10/10     SCALE USED: (1-10 VNRS)  PAIN SCORE WITH ACTIVITY:  10/10       SCALE USED: (1-10 VNRS)    PAIN ASSESSMENT:  - Pt c/o persistent periorbital pressure like sensation that is constant, with associated sharp sensation with flexion of neck  - Pt c/o sig. opioid induced constipation     PHYSICAL EXAM  GENERAL: NAD  HEAD:  Atraumatic, Normocephalic  NECK: Palpable stiffness/rididity, + Brudzinski  NERVOUS SYSTEM:    Alert & Oriented X3, Good concentration;   Cranial nerves grossly intact  Motor exam:         [X] Upper extremity            Bi(c5)  WE(c6)  EE(c7)   FF(c8)                                                R         5/5        5/5        5/5       5/5                                               L          5/5        5/5        5/5       5/5         [X] Lower extremity          HF(l2)   KE(l3)    TA(l4)   EHL(l5)  GS(s1)                                                 R        5/5        5/5        5/5       5/5         5/5                                               L         5/5        5/5       5/5       5/5          5/5                                                        [X] warm well perfused; capillary refill <3 seconds   SKIN: No rashes or lesions.      ASSESSMENT:       PLAN:   1. Opioids  Since yesterday 6am, pt has required:     PCA Setting:   - Opioids:   - Initial Bolus:   - Initial Demand:   - Lockout:   - Continuous Rate:   - 4 hour limit:     PLAN: C/w . Transition to . LA not required. Pt receiving adequete coverage. First step. For rescue dosing, will order .      2. Neuropathics  Pt currently denies neuropathic pain. No numbness/tingling/electric shock like sensation in LE/UE b.l.    PLAN: No indication for neuropathic agents.     3. Adjuvants  Pt ___ complaining of muscle spasms/cramping in neck/back. Tylenol 650mg q6h PRN for Mild Pain. Pt on/not on Percocet.     PLAN: C/w     4. Prophylactic:   Bowel regimen: Docusate Senna;    Nausea PRN: Zofran PRN for Nausea, pt does not c/o current    5. Functional Goals:   Pt will get OOB with PT today. Pt will resume previous level of activity without impairment from surgery.     6. Additional Consults:   None recommended.     7. Additional Labs/Imaging:   None recommended.     8. Follow up, Discharge Planning:   Patient is set for discharge to:   Discharge is pending:   Pain Management follow up plan: NEUROSURGERY PAIN MANAGEMENT PROGRESS NOTE    OVERNIGHT EVENTS:  - Pt reporting worsening of headache since down-titration of steroids. Sig. increase   - Pt continues to be febrile, w/ new Leukocytosis w/o left shift  - ID workup still underway:   - Fungal, AFB, and viral cultures of CSF pending, Fungal PCR pending    PLAN/RECOMMENDATIONS:  - Continue with Oxy-IR dosing, if persistent pain may consider addition of Oxy-ER tomorrow, if breakthrough pain, 1x Dilaudid 0.5mg IVP dosing, if >1hr since Oxy-IR admin and no relief  - Addition of Robaxin 500mg TID dosing (scheduled for now), change to PRN if sig. drowsiness after first dosing.   - Sig. issues of constipation with opioids, but had BM this AM, if continues to be an issue in next 2 days, will order Movantik, in stock with pharmacy  - Call NP if any questions with plan [phone]    INCOMPLETE NOTE    HPI:  32[place] with PMH of lumbar disc herniation (s/p epidural injection on 8/17) who was at [facility] on 11/13 for severe HA partially relieved by lying down, neck pain, fever, weakness, myalgias, back pain radiating to bl thighs.     Pt reports first experiencing similar symptoms 10 days ago (went to [facility] in [place] - neg w/u: LP, lumbar MRI w/contr, head CT, West Nile virus - discharged w/supportive tx + fiorocet).     Pt then went to Dr Carty and was sent to ED.  At [facility], neg MRI brain, neg RVP, neg BCx, neg HIV, evaluated by ID and Neuro - discharged home and felt better until 11/14 in pm - worsening of headache, photophobia and neck stiffness. She denied any other symptoms.  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   Denied: recent travel, IV drug use, unprotected intercourse, any rashes or bites, invasive procedures other than epidural injection in 08/2017.  In the ER, pt s/p IV hydromorphone and IVF, w/partial relief of headache; neck stiffness, photophobia, chills. ROS otherwise negative. Requesting food.  VS in ED: T(F): 101.5  HR: 108  BP: 89/57 RR: 16 SpO2: 98% (15 Nov 2017 19:50)      PAST MEDICAL & SURGICAL HISTORY:  Lumbar disc herniation  No significant past surgical history      FAMILY HISTORY:  No pertinent family history in first degree relatives      Allergies    aspirin (Hives; Swelling)  ibuprofen (Rash; Flushing; Hives)  lidocaine (Short breath)    Intolerances        PAIN MEDICATIONS:  acetaminophen   Tablet 650 milliGRAM(s) Oral every 6 hours PRN  acetaminophen   Tablet. 650 milliGRAM(s) Oral every 6 hours PRN  acetaminophen 325 mG/butalbital 50 mG/caffeine 40 mG 1 Tablet(s) Oral once PRN  diphenhydrAMINE   Capsule 25 milliGRAM(s) Oral at bedtime PRN  ondansetron    Tablet 4 milliGRAM(s) Oral every 8 hours PRN  oxyCODONE    IR 10 milliGRAM(s) Oral every 4 hours PRN  oxyCODONE    IR 5 milliGRAM(s) Oral every 4 hours PRN    Heme:    Antibiotics:  voriconazole 250 milliGRAM(s) Oral every 12 hours    Cardiovascular:    GI:  aluminum hydroxide/magnesium hydroxide/simethicone Suspension 30 milliLiter(s) Oral every 4 hours PRN  bisacodyl Suppository 10 milliGRAM(s) Rectal daily PRN  pantoprazole    Tablet 40 milliGRAM(s) Oral before breakfast  polyethylene glycol 3350 17 Gram(s) Oral every 12 hours  senna 2 Tablet(s) Oral at bedtime    Endocrine:  dexamethasone     Tablet   Oral   dexamethasone     Tablet 3 milliGRAM(s) Oral every 6 hours  dextrose 50% Injectable 12.5 Gram(s) IV Push once  dextrose 50% Injectable 25 Gram(s) IV Push once  dextrose 50% Injectable 25 Gram(s) IV Push once  dextrose Gel 1 Dose(s) Oral once PRN  glucagon  Injectable 1 milliGRAM(s) IntraMuscular once PRN  insulin lispro (HumaLOG) corrective regimen sliding scale   SubCutaneous Before meals and at bedtime    All Other Medications:  dextrose 5%. 1000 milliLiter(s) IV Continuous <Continuous>  influenza   Vaccine 0.5 milliLiter(s) IntraMuscular once      Vital Signs Last 24 Hrs  T(C): 39.1 (26 Nov 2017 16:35), Max: 39.4 (26 Nov 2017 02:23)  T(F): 102.4 (26 Nov 2017 16:35), Max: 103 (26 Nov 2017 02:23)  HR: 102 (26 Nov 2017 16:35) (76 - 102)  BP: 110/73 (26 Nov 2017 16:35) (96/54 - 115/69)  BP(mean): --  RR: 18 (26 Nov 2017 16:35) (16 - 18)  SpO2: 95% (26 Nov 2017 16:35) (93% - 98%)    LABS:                        13.4   18.9  )-----------( 335      ( 26 Nov 2017 07:36 )             39.0     11-26    133<L>  |  93<L>  |  15  ----------------------------<  131<H>  3.9   |  28  |  0.66    Ca    8.5      26 Nov 2017 07:36  Mg     2.3     11-26    TPro  5.9<L>  /  Alb  3.2<L>  /  TBili  0.4  /  DBili  x   /  AST  10  /  ALT  50<H>  /  AlkPhos  36<L>  11-25          REVIEW OF SYSTEMS:  CONSTITUTIONAL: Persistent fever overnight.   EYES: Cont associated periorbital eye pain, denies visual disturbances  ENMT:  No difficulty hearing. No throat pain  NECK: Severe stiffness & neck pain   RESPIRATORY: No cough, wheezing; No shortness of breath  CARDIOVASCULAR: No chest pain, palpitations.   GASTROINTESTINAL: Pt reports passing gas. Issues with constipation with opioids, today finally had bowel movement. No abdominal or epigastric pain. No nausea, vomiting.   GENITOURINARY: No dysuria, frequency, or incontinence.  NEUROLOGICAL: Severe sig. worsening headaches, No loss of strength, no numbness, or tremors. No dizziness or lightheadedness with pain medications. Sensation of clouded feeling with medications.   MUSCULOSKELETAL: Denies severity of lumbar back pain. w/ radicular pain. C/o sig. muscle stiffness/cramping in neck, sig. nucal rigidity. No joint pain or swelling.  INTEGUMENTARY: C/o pruritus, no active rash.    FUNCTIONAL ASSESSMENT:  PAIN SCORE AT REST:    10/10     SCALE USED: (1-10 VNRS)  PAIN SCORE WITH ACTIVITY:  10/10       SCALE USED: (1-10 VNRS)    PAIN ASSESSMENT:  - Pt c/o persistent periorbital pressure like sensation that is constant, with associated sharp sensation with flexion of neck  - Pt c/o sig. opioid induced constipation     PHYSICAL EXAM  GENERAL: NAD  HEAD:  Atraumatic, Normocephalic  NECK: Palpable stiffness/rididity, + Brudzinski  NERVOUS SYSTEM:    Alert & Oriented X3, Good concentration;   Cranial nerves grossly intact  Motor exam:         [X] Upper extremity            Bi(c5)  WE(c6)  EE(c7)   FF(c8)                                                R         5/5        5/5        5/5       5/5                                               L          5/5        5/5        5/5       5/5         [X] Lower extremity          HF(l2)   KE(l3)    TA(l4)   EHL(l5)  GS(s1)                                                 R        5/5        5/5        5/5       5/5         5/5                                               L         5/5        5/5       5/5       5/5          5/5                                                        [X] warm well perfused; capillary refill <3 seconds   SKIN: No rashes or lesions.      ASSESSMENT:       PLAN:   1. Opioids  Since yesterday 6am, pt has required:     PCA Setting:   - Opioids:   - Initial Bolus:   - Initial Demand:   - Lockout:   - Continuous Rate:   - 4 hour limit:     PLAN: C/w . Transition to . LA not required. Pt receiving adequete coverage. First step. For rescue dosing, will order .      2. Neuropathics  Pt currently denies neuropathic pain. No numbness/tingling/electric shock like sensation in LE/UE b.l.    PLAN: No indication for neuropathic agents.     3. Adjuvants  Pt ___ complaining of muscle spasms/cramping in neck/back. Tylenol 650mg q6h PRN for Mild Pain. Pt on/not on Percocet.     PLAN: C/w     4. Prophylactic:   Bowel regimen: Docusate Senna;    Nausea PRN: Zofran PRN for Nausea, pt does not c/o current    5. Functional Goals:   Pt will get OOB with PT today. Pt will resume previous level of activity without impairment from surgery.     6. Additional Consults:   None recommended.     7. Additional Labs/Imaging:   None recommended.     8. Follow up, Discharge Planning:   Patient is set for discharge to:   Discharge is pending:   Pain Management follow up plan: NEUROSURGERY PAIN MANAGEMENT PROGRESS NOTE    OVERNIGHT EVENTS:  - Pt reporting worsening of headache since down-titration of steroids. Sig. increase, stating it is "the worst it has been"  - 3x 5mg Oxy-IR dosing, 1x 10mg Oxy-IR dosing since 6am yesterday  - Pt continues to be febrile, w/ new Leukocytosis w/o left shift  - ID workup still underway:   - Fungal, AFB, and viral cultures of CSF pending, Fungal PCR pending    PLAN/RECOMMENDATIONS:  - Continue with Oxy-IR dosing, if persistent pain may consider addition of Oxy-ER tomorrow, if breakthrough pain, 1x Dilaudid 0.5mg IVP dosing, if >1hr since Oxy-IR admin and no relief  - Addition of Robaxin 500mg TID dosing (scheduled for now), change to PRN if sig. drowsiness after first dosing.   - Sig. issues of constipation with opioids, but had BM this AM, if continues to be an issue in next 2 days, will order Movantik, in stock with pharmacy  - Call NP if any questions with plan [phone]. Continue to follow.    HPI:  32[place] with PMH of lumbar disc herniation (s/p epidural injection on 8/17) who was at [facility] on 11/13 for severe HA partially relieved by lying down, neck pain, fever, weakness, myalgias, back pain radiating to bl thighs.     Pt reports first experiencing similar symptoms 10 days ago (went to [facility] in [place] - neg w/u: LP, lumbar MRI w/contr, head CT, West Nile virus - discharged w/supportive tx + fiorocet).     Pt then went to Dr Carty and was sent to ED.  At [facility], neg MRI brain, neg RVP, neg BCx, neg HIV, evaluated by ID and Neuro - discharged home and felt better until 11/14 in pm - worsening of headache, photophobia and neck stiffness. She denied any other symptoms.  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   Denied: recent travel, IV drug use, unprotected intercourse, any rashes or bites, invasive procedures other than epidural injection in 08/2017.  In the ER, pt s/p IV hydromorphone and IVF, w/partial relief of headache; neck stiffness, photophobia, chills. ROS otherwise negative. Requesting food.  VS in ED: T(F): 101.5  HR: 108  BP: 89/57 RR: 16 SpO2: 98% (15 Nov 2017 19:50)      PAST MEDICAL & SURGICAL HISTORY:  Lumbar disc herniation  No significant past surgical history      FAMILY HISTORY:  No pertinent family history in first degree relatives      Allergies    aspirin (Hives; Swelling)  ibuprofen (Rash; Flushing; Hives)  lidocaine (Short breath)    Intolerances        PAIN MEDICATIONS:  acetaminophen   Tablet 650 milliGRAM(s) Oral every 6 hours PRN  acetaminophen   Tablet. 650 milliGRAM(s) Oral every 6 hours PRN  acetaminophen 325 mG/butalbital 50 mG/caffeine 40 mG 1 Tablet(s) Oral once PRN  diphenhydrAMINE   Capsule 25 milliGRAM(s) Oral at bedtime PRN  ondansetron    Tablet 4 milliGRAM(s) Oral every 8 hours PRN  oxyCODONE    IR 10 milliGRAM(s) Oral every 4 hours PRN  oxyCODONE    IR 5 milliGRAM(s) Oral every 4 hours PRN    Heme:    Antibiotics:  voriconazole 250 milliGRAM(s) Oral every 12 hours    Cardiovascular:    GI:  aluminum hydroxide/magnesium hydroxide/simethicone Suspension 30 milliLiter(s) Oral every 4 hours PRN  bisacodyl Suppository 10 milliGRAM(s) Rectal daily PRN  pantoprazole    Tablet 40 milliGRAM(s) Oral before breakfast  polyethylene glycol 3350 17 Gram(s) Oral every 12 hours  senna 2 Tablet(s) Oral at bedtime    Endocrine:  dexamethasone     Tablet   Oral   dexamethasone     Tablet 3 milliGRAM(s) Oral every 6 hours  dextrose 50% Injectable 12.5 Gram(s) IV Push once  dextrose 50% Injectable 25 Gram(s) IV Push once  dextrose 50% Injectable 25 Gram(s) IV Push once  dextrose Gel 1 Dose(s) Oral once PRN  glucagon  Injectable 1 milliGRAM(s) IntraMuscular once PRN  insulin lispro (HumaLOG) corrective regimen sliding scale   SubCutaneous Before meals and at bedtime    All Other Medications:  dextrose 5%. 1000 milliLiter(s) IV Continuous <Continuous>  influenza   Vaccine 0.5 milliLiter(s) IntraMuscular once      Vital Signs Last 24 Hrs  T(C): 39.1 (26 Nov 2017 16:35), Max: 39.4 (26 Nov 2017 02:23)  T(F): 102.4 (26 Nov 2017 16:35), Max: 103 (26 Nov 2017 02:23)  HR: 102 (26 Nov 2017 16:35) (76 - 102)  BP: 110/73 (26 Nov 2017 16:35) (96/54 - 115/69)  BP(mean): --  RR: 18 (26 Nov 2017 16:35) (16 - 18)  SpO2: 95% (26 Nov 2017 16:35) (93% - 98%)    LABS:                        13.4   18.9  )-----------( 335      ( 26 Nov 2017 07:36 )             39.0     11-26    133<L>  |  93<L>  |  15  ----------------------------<  131<H>  3.9   |  28  |  0.66    Ca    8.5      26 Nov 2017 07:36  Mg     2.3     11-26    TPro  5.9<L>  /  Alb  3.2<L>  /  TBili  0.4  /  DBili  x   /  AST  10  /  ALT  50<H>  /  AlkPhos  36<L>  11-25          REVIEW OF SYSTEMS:  CONSTITUTIONAL: Persistent fever overnight.   EYES: Cont associated periorbital eye pain, denies visual disturbances  ENMT:  No difficulty hearing. No throat pain  NECK: Severe stiffness & neck pain   RESPIRATORY: No cough, wheezing; No shortness of breath  CARDIOVASCULAR: No chest pain, palpitations.   GASTROINTESTINAL: Pt reports passing gas. Issues with constipation with opioids, today finally had bowel movement. No abdominal or epigastric pain. No nausea, vomiting.   GENITOURINARY: No dysuria, frequency, or incontinence.  NEUROLOGICAL: Severe sig. worsening headaches, No loss of strength, no numbness, or tremors. No dizziness or lightheadedness with pain medications. Sensation of clouded feeling with medications.   MUSCULOSKELETAL: Denies severity of lumbar back pain. w/ radicular pain. C/o sig. muscle stiffness/cramping in neck, sig. nucal rigidity. No joint pain or swelling.  INTEGUMENTARY: C/o pruritus, no active rash.    FUNCTIONAL ASSESSMENT:  PAIN SCORE AT REST:    10/10     SCALE USED: (1-10 VNRS)  PAIN SCORE WITH ACTIVITY:  10/10       SCALE USED: (1-10 VNRS)    PAIN ASSESSMENT:  - Pt c/o worsening sharp diffuse headache and persistent periorbital pressure like sensation that is constant, with associated sharp sensation with flexion of neck  - C/o worsening stiffness/nucal rigidity and sensation of "locked jaw"  - Pt c/o sig. opioid induced constipation & associated discomfort    PHYSICAL EXAM  GENERAL: NAD  HEAD:  Atraumatic, Normocephalic  NECK: Palpable stiffness/rididity, + Brudzinski  NERVOUS SYSTEM:    Alert & Oriented X3, Good concentration;   Cranial nerves grossly intact  Motor exam:         [X] Upper extremity            Bi(c5)  WE(c6)  EE(c7)   FF(c8)                                                R         5/5        5/5        5/5       5/5                                               L          5/5        5/5        5/5       5/5         [X] Lower extremity          HF(l2)   KE(l3)    TA(l4)   EHL(l5)  GS(s1)                                                 R        5/5        5/5        5/5       5/5         5/5                                               L         5/5        5/5       5/5       5/5          5/5                                                    [X] warm well perfused; capillary refill <3 seconds   SKIN: No rashes or lesions.      ASSESSMENT:   32F w/PMH HNP s/p epidural injection 8/17 p/w worsening headache, photophobia, neck stiffness, myalgias, fever, weakness, bl shooting back pain, concerning for infectious meningitis,  NOW pending workup, downtitration of steroids, c/o worsening headache since Friday.    RECOMMENDATIONS:   1. Opioids  Since yesterday 6am, pt has required:   3x 5mg Oxy-IR dosing, 1x 10mg Oxy-IR dosing.    PLAN: C/w current regimen, consider transition  . Transition to . LA not required. Pt receiving adequete coverage. First step. For rescue dosing, will order .      2. Neuropathics  Pt currently denies neuropathic pain. No numbness/tingling/electric shock like sensation in LE/UE b.l.  PLAN: No indication for neuropathic agents.     3. Adjuvants  Pt is complaining of muscle spasms/cramping in neck. Tylenol 650mg q6h PRN for Mild Pain. Pt on Oxy-IR not Percocet.   - Start Robaxin, low dose, with hold parameters as above. TDD<4g/hr    4. Prophylactic:   Bowel regimen: Docusate Senna;  Possible addition of Movantik if persistent constipation, Dulcolax PO qnight PRN constipation addition  Nausea PRN: Zofran PRN for Nausea, pt does not c/o current    5. Functional Goals:   Pt will get OOB independently today. Pt will resume previous level of activity without impairment from pain.     6. Additional Consults:   None recommended.     7. Additional Labs/Imaging:   None recommended.     8. Follow up, Discharge Planning:   Patient is set for discharge to: Pending eval  Discharge is pending: Pending eval  Pain Management follow up plan: C/t f/u inpatient/outpatient. NEUROSURGERY PAIN MANAGEMENT PROGRESS NOTE    OVERNIGHT EVENTS:  - Pt reporting worsening of headache since down-titration of steroids. Sig. increase, stating it is "the worst it has been"  - 3x 5mg Oxy-IR dosing, 1x 10mg Oxy-IR dosing since 6am yesterday  - Pt continues to be febrile, w/ new Leukocytosis w/o left shift  - ID workup still underway:   - Fungal, AFB, and viral cultures of CSF pending, Fungal PCR pending    PLAN/RECOMMENDATIONS:  - Continue with Oxy-IR dosing, if persistent pain may consider addition of Oxy-ER tomorrow, if breakthrough pain, 1x Dilaudid 0.5mg IVP dosing, if >1hr since Oxy-IR admin and no relief  - Addition of Robaxin 500mg TID dosing (scheduled for now), change to PRN if sig. drowsiness after first dosing.   - Sig. issues of constipation with opioids, but had BM this AM, if continues to be an issue in next 2 days, will order Movantik, in stock with pharmacy  - Call NP if any questions with plan [phone]. Continue to follow.    HPI:  32[place] with PMH of lumbar disc herniation (s/p epidural injection on 8/17) who was at [facility] on 11/13 for severe HA partially relieved by lying down, neck pain, fever, weakness, myalgias, back pain radiating to bl thighs.     Pt reports first experiencing similar symptoms 10 days ago (went to [facility] in [place] - neg w/u: LP, lumbar MRI w/contr, head CT, West Nile virus - discharged w/supportive tx + fiorocet).     Pt then went to Dr Carty and was sent to ED.  At [facility], neg MRI brain, neg RVP, neg BCx, neg HIV, evaluated by ID and Neuro - discharged home and felt better until 11/14 in pm - worsening of headache, photophobia and neck stiffness. She denied any other symptoms.  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   Denied: recent travel, IV drug use, unprotected intercourse, any rashes or bites, invasive procedures other than epidural injection in 08/2017.  In the ER, pt s/p IV hydromorphone and IVF, w/partial relief of headache; neck stiffness, photophobia, chills. ROS otherwise negative. Requesting food.  VS in ED: T(F): 101.5  HR: 108  BP: 89/57 RR: 16 SpO2: 98% (15 Nov 2017 19:50)      PAST MEDICAL & SURGICAL HISTORY:  Lumbar disc herniation  No significant past surgical history      FAMILY HISTORY:  No pertinent family history in first degree relatives      Allergies    aspirin (Hives; Swelling)  ibuprofen (Rash; Flushing; Hives)  lidocaine (Short breath)    Intolerances        PAIN MEDICATIONS:  acetaminophen   Tablet 650 milliGRAM(s) Oral every 6 hours PRN  acetaminophen   Tablet. 650 milliGRAM(s) Oral every 6 hours PRN  acetaminophen 325 mG/butalbital 50 mG/caffeine 40 mG 1 Tablet(s) Oral once PRN  diphenhydrAMINE   Capsule 25 milliGRAM(s) Oral at bedtime PRN  ondansetron    Tablet 4 milliGRAM(s) Oral every 8 hours PRN  oxyCODONE    IR 10 milliGRAM(s) Oral every 4 hours PRN  oxyCODONE    IR 5 milliGRAM(s) Oral every 4 hours PRN    Heme:    Antibiotics:  voriconazole 250 milliGRAM(s) Oral every 12 hours    Cardiovascular:    GI:  aluminum hydroxide/magnesium hydroxide/simethicone Suspension 30 milliLiter(s) Oral every 4 hours PRN  bisacodyl Suppository 10 milliGRAM(s) Rectal daily PRN  pantoprazole    Tablet 40 milliGRAM(s) Oral before breakfast  polyethylene glycol 3350 17 Gram(s) Oral every 12 hours  senna 2 Tablet(s) Oral at bedtime    Endocrine:  dexamethasone     Tablet   Oral   dexamethasone     Tablet 3 milliGRAM(s) Oral every 6 hours  dextrose 50% Injectable 12.5 Gram(s) IV Push once  dextrose 50% Injectable 25 Gram(s) IV Push once  dextrose 50% Injectable 25 Gram(s) IV Push once  dextrose Gel 1 Dose(s) Oral once PRN  glucagon  Injectable 1 milliGRAM(s) IntraMuscular once PRN  insulin lispro (HumaLOG) corrective regimen sliding scale   SubCutaneous Before meals and at bedtime    All Other Medications:  dextrose 5%. 1000 milliLiter(s) IV Continuous <Continuous>  influenza   Vaccine 0.5 milliLiter(s) IntraMuscular once      Vital Signs Last 24 Hrs  T(C): 39.1 (26 Nov 2017 16:35), Max: 39.4 (26 Nov 2017 02:23)  T(F): 102.4 (26 Nov 2017 16:35), Max: 103 (26 Nov 2017 02:23)  HR: 102 (26 Nov 2017 16:35) (76 - 102)  BP: 110/73 (26 Nov 2017 16:35) (96/54 - 115/69)  BP(mean): --  RR: 18 (26 Nov 2017 16:35) (16 - 18)  SpO2: 95% (26 Nov 2017 16:35) (93% - 98%)    LABS:                        13.4   18.9  )-----------( 335      ( 26 Nov 2017 07:36 )             39.0     11-26    133<L>  |  93<L>  |  15  ----------------------------<  131<H>  3.9   |  28  |  0.66    Ca    8.5      26 Nov 2017 07:36  Mg     2.3     11-26    TPro  5.9<L>  /  Alb  3.2<L>  /  TBili  0.4  /  DBili  x   /  AST  10  /  ALT  50<H>  /  AlkPhos  36<L>  11-25          REVIEW OF SYSTEMS:  CONSTITUTIONAL: Persistent fever overnight.   EYES: Cont associated periorbital eye pain, denies visual disturbances  ENMT:  No difficulty hearing. No throat pain  NECK: Severe stiffness & neck pain   RESPIRATORY: No cough, wheezing; No shortness of breath  CARDIOVASCULAR: No chest pain, palpitations.   GASTROINTESTINAL: Pt reports passing gas. Issues with constipation with opioids, today finally had bowel movement. No abdominal or epigastric pain. No nausea, vomiting.   GENITOURINARY: No dysuria, frequency, or incontinence.  NEUROLOGICAL: Severe sig. worsening headaches, No loss of strength, no numbness, or tremors. No dizziness or lightheadedness with pain medications. Sensation of clouded feeling with medications.   MUSCULOSKELETAL: Denies severity of lumbar back pain. w/ radicular pain. C/o sig. muscle stiffness/cramping in neck, sig. nucal rigidity. No joint pain or swelling.  INTEGUMENTARY: C/o pruritus, no active rash.    FUNCTIONAL ASSESSMENT:  PAIN SCORE AT REST:    10/10     SCALE USED: (1-10 VNRS)  PAIN SCORE WITH ACTIVITY:  10/10       SCALE USED: (1-10 VNRS)    PAIN ASSESSMENT:  - Pt c/o worsening sharp diffuse headache and persistent periorbital pressure like sensation that is constant, with associated sharp sensation with flexion of neck  - C/o worsening stiffness/nucal rigidity and sensation of "locked jaw"  - Pt c/o sig. opioid induced constipation & associated discomfort    PHYSICAL EXAM  GENERAL: NAD  HEAD:  Atraumatic, Normocephalic  NECK: Palpable stiffness/rididity, + Brudzinski  NERVOUS SYSTEM:    Alert & Oriented X3, Good concentration;   Cranial nerves grossly intact  Motor exam:         [X] Upper extremity            Bi(c5)  WE(c6)  EE(c7)   FF(c8)                                                R         5/5        5/5        5/5       5/5                                               L          5/5        5/5        5/5       5/5         [X] Lower extremity          HF(l2)   KE(l3)    TA(l4)   EHL(l5)  GS(s1)                                                 R        5/5        5/5        5/5       5/5         5/5                                               L         5/5        5/5       5/5       5/5          5/5                                                    [X] warm well perfused; capillary refill <3 seconds   SKIN: No rashes or lesions.      ASSESSMENT:   32F w/PMH HNP s/p epidural injection 8/17 p/w worsening headache, photophobia, neck stiffness, myalgias, fever, weakness, bl shooting back pain, concerning for infectious meningitis,  NOW pending workup, downtitration of steroids, c/o worsening headache since Friday.    RECOMMENDATIONS:   1. Opioids  Since yesterday 6am, pt has required:   3x 5mg Oxy-IR dosing, 1x 10mg Oxy-IR dosing.    PLAN: C/w current regimen, consider transition to LA later, for now will continue to observe. Encourage pt use if debilitating pain, pt reluctant to take for pain relief d/t sensitivity, OIC prone.     2. Neuropathics  Pt currently denies neuropathic pain. No numbness/tingling/electric shock like sensation in LE/UE b.l.  PLAN: No indication for neuropathic agents.     3. Adjuvants  Pt is complaining of muscle spasms/cramping in neck. Tylenol 650mg q6h PRN for Mild Pain. Pt on Oxy-IR not Percocet.   - Start Robaxin, low dose, with hold parameters as above. TDD<4g/hr    4. Prophylactic:   Bowel regimen: Docusate Senna;  Possible addition of Movantik if persistent constipation, Dulcolax PO qnight PRN constipation addition  Nausea PRN: Zofran PRN for Nausea, pt does not c/o current    5. Functional Goals:   Pt will get OOB independently today. Pt will resume previous level of activity without impairment from pain.     6. Additional Consults:   None recommended.     7. Additional Labs/Imaging:   None recommended.     8. Follow up, Discharge Planning:   Patient is set for discharge to: Pending eval  Discharge is pending: Pending eval  Pain Management follow up plan: C/t f/u inpatient/outpatient.

## 2017-11-26 NOTE — PROGRESS NOTE ADULT - SUBJECTIVE AND OBJECTIVE BOX
INTERVAL HPI/OVERNIGHT EVENTS:  Still with headache and increased temps  C/O itching without rash  No diarrhea   Still with mild and periodical hallucinations  Nothing new otherwise    MEDICATIONS  (STANDING):  dexamethasone     Tablet   Oral   dexamethasone     Tablet 3 milliGRAM(s) Oral every 6 hours  dextrose 5%. 1000 milliLiter(s) (50 mL/Hr) IV Continuous <Continuous>  dextrose 50% Injectable 12.5 Gram(s) IV Push once  dextrose 50% Injectable 25 Gram(s) IV Push once  dextrose 50% Injectable 25 Gram(s) IV Push once  influenza   Vaccine 0.5 milliLiter(s) IntraMuscular once  insulin lispro (HumaLOG) corrective regimen sliding scale   SubCutaneous Before meals and at bedtime  pantoprazole    Tablet 40 milliGRAM(s) Oral before breakfast  polyethylene glycol 3350 17 Gram(s) Oral every 12 hours  senna 2 Tablet(s) Oral at bedtime  voriconazole 250 milliGRAM(s) Oral every 12 hours    MEDICATIONS  (PRN):  acetaminophen   Tablet 650 milliGRAM(s) Oral every 6 hours PRN For Temp greater than 38 C (100.4 F)  acetaminophen   Tablet. 650 milliGRAM(s) Oral every 6 hours PRN Mild Pain (1 - 3)  acetaminophen 325 mG/butalbital 50 mG/caffeine 40 mG 1 Tablet(s) Oral once PRN HA  aluminum hydroxide/magnesium hydroxide/simethicone Suspension 30 milliLiter(s) Oral every 4 hours PRN Dyspepsia  bisacodyl Suppository 10 milliGRAM(s) Rectal daily PRN Constipation  dextrose Gel 1 Dose(s) Oral once PRN Blood Glucose LESS THAN 70 milliGRAM(s)/deciliter  diphenhydrAMINE   Capsule 25 milliGRAM(s) Oral at bedtime PRN Rash and/or Itching  glucagon  Injectable 1 milliGRAM(s) IntraMuscular once PRN Glucose LESS THAN 70 milligrams/deciliter  ondansetron    Tablet 4 milliGRAM(s) Oral every 8 hours PRN Nausea  oxyCODONE    IR 10 milliGRAM(s) Oral every 4 hours PRN Severe Pain (7 - 10)  oxyCODONE    IR 5 milliGRAM(s) Oral every 4 hours PRN Moderate Pain (4 - 6)      Allergies    aspirin (Hives; Swelling)  ibuprofen (Rash; Flushing; Hives)  lidocaine (Short breath)    EXAM  Vital Signs Last 24 Hrs  T(C): 38 (26 Nov 2017 10:09), Max: 39.4 (26 Nov 2017 02:23)  T(F): 100.4 (26 Nov 2017 10:09), Max: 103 (26 Nov 2017 02:23)  HR: 88 (26 Nov 2017 10:09) (76 - 88)  BP: 104/62 (26 Nov 2017 10:09) (96/54 - 115/69)  BP(mean): --  RR: 18 (26 Nov 2017 10:09) (16 - 18)  SpO2: 98% (26 Nov 2017 10:09) (93% - 98%)  Awake and alert  Moving her neck and eyes normally  Appears neurologically, including motor-wise intact  No apparent rash  RRR  Unchanged otherwise      LABS:                        13.4   18.9  )-----------( 335      ( 26 Nov 2017 07:36 )             39.0       Manual Differential (11.26.17 @ 09:52)    Auto Neutrophil %: 83.0: Please note that absolute numbers are not reported at [facility]. %    Auto Monocyte %: 9.0: Please note that absolute numbers are not reported at [facility]. %    Auto Lymphocyte %: 7.0: Please note that absolute numbers are not reported at [facility]. %    Red Cell Morphology: Normal    Platelet Morphology: Normal    Comment - Hematology: Manual differential performed    Manual Smear Verification: Performed    Band Neutrophils %: 1.0 %      11-26    133<L>  |  93<L>  |  15  ----------------------------<  131<H>  3.9   |  28  |  0.66    Ca    8.5      26 Nov 2017 07:36  Mg     2.3     11-26    TPro  5.9<L>  /  Alb  3.2<L>  /  TBili  0.4  /  DBili  x   /  AST  10  /  ALT  50<H>  /  AlkPhos  36<L>  11-25    Leonid-Barr Virus Serologic Test (11.25.17 @ 18:32)    EBNA IgG EIA: 7.1 U/mL    EBV EA Ab EIA: <5.0 U/mL    EBV VCA IgG EIA: 60.2 U/mL    EBV VCA IgM EIA: <10.0 U/mL    EBV Interpretation: See Note: INTERPRETATION OF LEONID BARR VIRUS (EBV) ANTIBODY RESULTS  EBV VCA IGG AB EBV NA IGG AB EBV VCA IGM AB EBV EA IGG AB Diagnosis  NEG NEG NEG NEG EBV Sero-negative  NEG NEG POS NEG Suspected primary infection (Early Phase)  POS NEG POS POS/NEG Past EBV infection ( Convalescence)  POS POS NEG POS/NEG Past EBV infection  POS POS POS/NEG POS Reactivated Infection    Voriconazole Level (11.24.17 @ 06:00)    Voriconazole Level: 2.2: -------------------ADDITIONAL INFORMATION-------------------  This test was developed and its performance characteristics  determined by [facility] in a manner consistent with CLIA  requirements. This test has not been cleared or approved by  the U.S. Food and Drug Administration.  Test Performed by:  [facility]  [address] [place] [address] mcg/mL    CMV viral load pending      MICROBIOLOGY:    All cultures remain negative, including the fungal culture, so far    Fungal PCR pending with the [facility]    Viral sequencing testing at Dr Westbrook's lab on hold for the time being

## 2017-11-26 NOTE — PROGRESS NOTE ADULT - SUBJECTIVE AND OBJECTIVE BOX
Pt seen and examined   coverage dr. Lopez  Headaches and fever    REVIEW OF SYSTEMS:  Constitutional:+ fever, headache  Cardiovascular: No chest pain, palpitations, dizziness or leg swelling  Gastrointestinal: No abdominal or epigastric pain. No nausea, vomiting or hematemesis; No diarrhea or constipation. No melena or hematochezia.  Skin: No itching, burning, rashes or lesions       MEDICATIONS:  MEDICATIONS  (STANDING):  dexamethasone     Tablet   Oral   dexamethasone     Tablet 3 milliGRAM(s) Oral every 6 hours  dextrose 5%. 1000 milliLiter(s) (50 mL/Hr) IV Continuous <Continuous>  dextrose 50% Injectable 12.5 Gram(s) IV Push once  dextrose 50% Injectable 25 Gram(s) IV Push once  dextrose 50% Injectable 25 Gram(s) IV Push once  influenza   Vaccine 0.5 milliLiter(s) IntraMuscular once  insulin lispro (HumaLOG) corrective regimen sliding scale   SubCutaneous Before meals and at bedtime  pantoprazole    Tablet 40 milliGRAM(s) Oral before breakfast  polyethylene glycol 3350 17 Gram(s) Oral every 12 hours  senna 2 Tablet(s) Oral at bedtime  voriconazole 250 milliGRAM(s) Oral every 12 hours    MEDICATIONS  (PRN):  acetaminophen   Tablet 650 milliGRAM(s) Oral every 6 hours PRN For Temp greater than 38 C (100.4 F)  acetaminophen   Tablet. 650 milliGRAM(s) Oral every 6 hours PRN Mild Pain (1 - 3)  acetaminophen 325 mG/butalbital 50 mG/caffeine 40 mG 1 Tablet(s) Oral once PRN HA  aluminum hydroxide/magnesium hydroxide/simethicone Suspension 30 milliLiter(s) Oral every 4 hours PRN Dyspepsia  bisacodyl Suppository 10 milliGRAM(s) Rectal daily PRN Constipation  dextrose Gel 1 Dose(s) Oral once PRN Blood Glucose LESS THAN 70 milliGRAM(s)/deciliter  diphenhydrAMINE   Capsule 25 milliGRAM(s) Oral at bedtime PRN Rash and/or Itching  glucagon  Injectable 1 milliGRAM(s) IntraMuscular once PRN Glucose LESS THAN 70 milligrams/deciliter  ondansetron    Tablet 4 milliGRAM(s) Oral every 8 hours PRN Nausea  oxyCODONE    IR 10 milliGRAM(s) Oral every 4 hours PRN Severe Pain (7 - 10)  oxyCODONE    IR 5 milliGRAM(s) Oral every 4 hours PRN Moderate Pain (4 - 6)      Allergies    aspirin (Hives; Swelling)  ibuprofen (Rash; Flushing; Hives)  lidocaine (Short breath)    Intolerances        Vital Signs Last 24 Hrs  T(C): 38 (26 Nov 2017 10:09), Max: 39.4 (26 Nov 2017 02:23)  T(F): 100.4 (26 Nov 2017 10:09), Max: 103 (26 Nov 2017 02:23)  HR: 88 (26 Nov 2017 10:09) (76 - 88)  BP: 104/62 (26 Nov 2017 10:09) (96/54 - 115/69)  BP(mean): --  RR: 18 (26 Nov 2017 10:09) (16 - 18)  SpO2: 98% (26 Nov 2017 10:09) (93% - 98%)      PHYSICAL EXAM:    General: c/o headache  HEENT: MMM, conjunctiva and sclera clear  Lungs: clear  Heart: regular  Gastrointestinal: Soft non-tender non-distended; Normal bowel sounds; No hepatosplenomegaly  Skin: Warm and dry. No obvious rash    LABS:      CBC Full  -  ( 26 Nov 2017 09:52 )  WBC Count : x  Hemoglobin : x  Hematocrit : x  Platelet Count - Automated : x  Mean Cell Volume : x  Mean Cell Hemoglobin : x  Mean Cell Hemoglobin Concentration : x  Auto Neutrophil # : x  Auto Lymphocyte # : x  Auto Monocyte # : x  Auto Eosinophil # : x  Auto Basophil # : x  Auto Neutrophil % : 83.0 %  Auto Lymphocyte % : 7.0 %  Auto Monocyte % : 9.0 %  Auto Eosinophil % : x  Auto Basophil % : x    11-26    133<L>  |  93<L>  |  15  ----------------------------<  131<H>  3.9   |  28  |  0.66    Ca    8.5      26 Nov 2017 07:36  Mg     2.3     11-26    TPro  5.9<L>  /  Alb  3.2<L>  /  TBili  0.4  /  DBili  x   /  AST  10  /  ALT  50<H>  /  AlkPhos  36<L>  11-25                      RADIOLOGY & ADDITIONAL STUDIES (The following images were personally reviewed):

## 2017-11-27 LAB
ALBUMIN SERPL ELPH-MCNC: 3.2 G/DL — LOW (ref 3.3–5)
ALP SERPL-CCNC: 35 U/L — LOW (ref 40–120)
ALT FLD-CCNC: 63 U/L — HIGH (ref 10–45)
ANION GAP SERPL CALC-SCNC: 13 MMOL/L — SIGNIFICANT CHANGE UP (ref 5–17)
AST SERPL-CCNC: 15 U/L — SIGNIFICANT CHANGE UP (ref 10–40)
BASOPHILS NFR BLD AUTO: 0.1 % — SIGNIFICANT CHANGE UP (ref 0–2)
BILIRUB SERPL-MCNC: 0.3 MG/DL — SIGNIFICANT CHANGE UP (ref 0.2–1.2)
BUN SERPL-MCNC: 12 MG/DL — SIGNIFICANT CHANGE UP (ref 7–23)
CALCIUM SERPL-MCNC: 8.6 MG/DL — SIGNIFICANT CHANGE UP (ref 8.4–10.5)
CHLORIDE SERPL-SCNC: 92 MMOL/L — LOW (ref 96–108)
CMV DNA CSF QL NAA+PROBE: SIGNIFICANT CHANGE UP
CO2 SERPL-SCNC: 27 MMOL/L — SIGNIFICANT CHANGE UP (ref 22–31)
CREAT SERPL-MCNC: 0.7 MG/DL — SIGNIFICANT CHANGE UP (ref 0.5–1.3)
GLUCOSE BLDC GLUCOMTR-MCNC: 121 MG/DL — HIGH (ref 70–99)
GLUCOSE BLDC GLUCOMTR-MCNC: 131 MG/DL — HIGH (ref 70–99)
GLUCOSE BLDC GLUCOMTR-MCNC: 191 MG/DL — HIGH (ref 70–99)
GLUCOSE BLDC GLUCOMTR-MCNC: 223 MG/DL — HIGH (ref 70–99)
GLUCOSE SERPL-MCNC: 136 MG/DL — HIGH (ref 70–99)
HCT VFR BLD CALC: 37.8 % — SIGNIFICANT CHANGE UP (ref 34.5–45)
HGB BLD-MCNC: 12.7 G/DL — SIGNIFICANT CHANGE UP (ref 11.5–15.5)
LYMPHOCYTES # BLD AUTO: 4.3 % — LOW (ref 13–44)
MAGNESIUM SERPL-MCNC: 2.4 MG/DL — SIGNIFICANT CHANGE UP (ref 1.6–2.6)
MCHC RBC-ENTMCNC: 28.9 PG — SIGNIFICANT CHANGE UP (ref 27–34)
MCHC RBC-ENTMCNC: 33.6 G/DL — SIGNIFICANT CHANGE UP (ref 32–36)
MCV RBC AUTO: 86.1 FL — SIGNIFICANT CHANGE UP (ref 80–100)
MISCELLANEOUS TEST NAME: SIGNIFICANT CHANGE UP
MONOCYTES NFR BLD AUTO: 8.4 % — SIGNIFICANT CHANGE UP (ref 2–14)
NEUTROPHILS NFR BLD AUTO: 87.2 % — HIGH (ref 43–77)
PLATELET # BLD AUTO: 257 K/UL — SIGNIFICANT CHANGE UP (ref 150–400)
POTASSIUM SERPL-MCNC: 4.4 MMOL/L — SIGNIFICANT CHANGE UP (ref 3.5–5.3)
POTASSIUM SERPL-SCNC: 4.4 MMOL/L — SIGNIFICANT CHANGE UP (ref 3.5–5.3)
PROT SERPL-MCNC: 6 G/DL — SIGNIFICANT CHANGE UP (ref 6–8.3)
RBC # BLD: 4.39 M/UL — SIGNIFICANT CHANGE UP (ref 3.8–5.2)
RBC # FLD: 12.2 % — SIGNIFICANT CHANGE UP (ref 10.3–16.9)
SODIUM SERPL-SCNC: 132 MMOL/L — LOW (ref 135–145)
WBC # BLD: 13.8 K/UL — HIGH (ref 3.8–10.5)
WBC # FLD AUTO: 13.8 K/UL — HIGH (ref 3.8–10.5)
WNV RNA SPEC QL NAA+PROBE: NEGATIVE — SIGNIFICANT CHANGE UP
WNV RNA SPEC QL NAA+PROBE: SIGNIFICANT CHANGE UP

## 2017-11-27 PROCEDURE — 93010 ELECTROCARDIOGRAM REPORT: CPT

## 2017-11-27 RX ORDER — METHOCARBAMOL 500 MG/1
500 TABLET, FILM COATED ORAL EVERY 8 HOURS
Qty: 0 | Refills: 0 | Status: DISCONTINUED | OUTPATIENT
Start: 2017-11-27 | End: 2017-11-29

## 2017-11-27 RX ADMIN — Medication 2 MILLIGRAM(S): at 19:07

## 2017-11-27 RX ADMIN — VORICONAZOLE 250 MILLIGRAM(S): 10 INJECTION, POWDER, LYOPHILIZED, FOR SOLUTION INTRAVENOUS at 12:28

## 2017-11-27 RX ADMIN — Medication 3 MILLIGRAM(S): at 02:08

## 2017-11-27 RX ADMIN — PANTOPRAZOLE SODIUM 40 MILLIGRAM(S): 20 TABLET, DELAYED RELEASE ORAL at 06:59

## 2017-11-27 RX ADMIN — Medication 2 MILLIGRAM(S): at 13:47

## 2017-11-27 RX ADMIN — SENNA PLUS 2 TABLET(S): 8.6 TABLET ORAL at 21:47

## 2017-11-27 RX ADMIN — OXYCODONE HYDROCHLORIDE 5 MILLIGRAM(S): 5 TABLET ORAL at 23:27

## 2017-11-27 RX ADMIN — Medication 3 MILLIGRAM(S): at 08:11

## 2017-11-27 RX ADMIN — Medication 1 TABLET(S): at 16:03

## 2017-11-27 RX ADMIN — Medication 1 TABLET(S): at 16:33

## 2017-11-27 RX ADMIN — Medication 650 MILLIGRAM(S): at 17:27

## 2017-11-27 RX ADMIN — OXYCODONE HYDROCHLORIDE 5 MILLIGRAM(S): 5 TABLET ORAL at 05:48

## 2017-11-27 RX ADMIN — POLYETHYLENE GLYCOL 3350 17 GRAM(S): 17 POWDER, FOR SOLUTION ORAL at 06:59

## 2017-11-27 RX ADMIN — VORICONAZOLE 250 MILLIGRAM(S): 10 INJECTION, POWDER, LYOPHILIZED, FOR SOLUTION INTRAVENOUS at 23:21

## 2017-11-27 RX ADMIN — OXYCODONE HYDROCHLORIDE 5 MILLIGRAM(S): 5 TABLET ORAL at 04:48

## 2017-11-27 RX ADMIN — Medication 4: at 12:27

## 2017-11-27 RX ADMIN — Medication 650 MILLIGRAM(S): at 04:48

## 2017-11-27 RX ADMIN — Medication 2: at 21:46

## 2017-11-27 NOTE — PROGRESS NOTE ADULT - SUBJECTIVE AND OBJECTIVE BOX
NEUROSURGERY PAIN MANAGEMENT PROGRESS NOTE    OVERNIGHT EVENTS:  - Pt continues to complain of malaise, severe headaches, pt states that it feels like her "head is on fire"    PLAN/RECOMMENDATIONS:  - Continue to recommend trial of Fioricet during day for headache  - Addition of Robaxin 500mg TID PRN for Stiffness/Spasms  - Hold off on addition of Movantik 25mcg dosing daily for prevention of opioid induced constipation. Per pharmacy, interaction with Voriconazole, increases concentration of naloxegol and could perpetuate withdrawal symptoms.     Incomplete note below    HPI:  32[place] with PMH of lumbar disc herniation (s/p epidural injection on 8/17) who was at [facility] on 11/13 for severe HA partially relieved by lying down, neck pain, fever, weakness, myalgias, back pain radiating to bl thighs.     Pt reports first experiencing similar symptoms 10 days ago (went to [facility] in [place] - neg w/u: LP, lumbar MRI w/contr, head CT, West Nile virus - discharged w/supportive tx + fiorocet).     Pt then went to Dr Carty and was sent to ED.  At [facility], neg MRI brain, neg RVP, neg BCx, neg HIV, evaluated by ID and Neuro - discharged home and felt better until 11/14 in pm - worsening of headache, photophobia and neck stiffness. She denied any other symptoms.  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   Denied: recent travel, IV drug use, unprotected intercourse, any rashes or bites, invasive procedures other than epidural injection in 08/2017.  In the ER, pt s/p IV hydromorphone and IVF, w/partial relief of headache; neck stiffness, photophobia, chills. ROS otherwise negative. Requesting food.  VS in ED: T(F): 101.5  HR: 108  BP: 89/57 RR: 16 SpO2: 98% (15 Nov 2017 19:50)      PAST MEDICAL & SURGICAL HISTORY:  Lumbar disc herniation  No significant past surgical history      FAMILY HISTORY:  No pertinent family history in first degree relatives      Allergies    aspirin (Hives; Swelling)  ibuprofen (Rash; Flushing; Hives)  lidocaine (Short breath)    Intolerances        PAIN MEDICATIONS:  acetaminophen   Tablet 650 milliGRAM(s) Oral every 6 hours PRN  acetaminophen   Tablet. 650 milliGRAM(s) Oral every 6 hours PRN  acetaminophen 325 mG/butalbital 50 mG/caffeine 40 mG 1 Tablet(s) Oral once PRN  diphenhydrAMINE   Capsule 25 milliGRAM(s) Oral at bedtime PRN  methocarbamol 500 milliGRAM(s) Oral every 8 hours PRN  ondansetron    Tablet 4 milliGRAM(s) Oral every 8 hours PRN  oxyCODONE    IR 10 milliGRAM(s) Oral every 4 hours PRN  oxyCODONE    IR 5 milliGRAM(s) Oral every 4 hours PRN    Heme:    Antibiotics:  voriconazole 250 milliGRAM(s) Oral every 12 hours    Cardiovascular:    GI:  aluminum hydroxide/magnesium hydroxide/simethicone Suspension 30 milliLiter(s) Oral every 4 hours PRN  bisacodyl Suppository 10 milliGRAM(s) Rectal daily PRN  pantoprazole    Tablet 40 milliGRAM(s) Oral before breakfast  polyethylene glycol 3350 17 Gram(s) Oral every 12 hours  senna 2 Tablet(s) Oral at bedtime    Endocrine:  dexamethasone     Tablet   Oral   dexamethasone     Tablet 2 milliGRAM(s) Oral every 6 hours  dextrose 50% Injectable 12.5 Gram(s) IV Push once  dextrose 50% Injectable 25 Gram(s) IV Push once  dextrose 50% Injectable 25 Gram(s) IV Push once  dextrose Gel 1 Dose(s) Oral once PRN  glucagon  Injectable 1 milliGRAM(s) IntraMuscular once PRN  insulin lispro (HumaLOG) corrective regimen sliding scale   SubCutaneous Before meals and at bedtime    All Other Medications:  dextrose 5%. 1000 milliLiter(s) IV Continuous <Continuous>  influenza   Vaccine 0.5 milliLiter(s) IntraMuscular once      Vital Signs Last 24 Hrs  T(C): 37.3 (27 Nov 2017 09:03), Max: 39.4 (27 Nov 2017 04:34)  T(F): 99.2 (27 Nov 2017 09:03), Max: 102.9 (27 Nov 2017 04:34)  HR: 84 (27 Nov 2017 09:03) (84 - 102)  BP: 104/64 (27 Nov 2017 09:03) (93/55 - 112/61)  BP(mean): --  RR: 18 (27 Nov 2017 09:03) (18 - 20)  SpO2: 96% (27 Nov 2017 09:03) (94% - 98%)    LABS:                        12.7   13.8  )-----------( 257      ( 27 Nov 2017 07:04 )             37.8     11-27    132<L>  |  92<L>  |  12  ----------------------------<  136<H>  4.4   |  27  |  0.70    Ca    8.6      27 Nov 2017 07:04  Mg     2.4     11-27    TPro  6.0  /  Alb  3.2<L>  /  TBili  0.3  /  DBili  x   /  AST  15  /  ALT  63<H>  /  AlkPhos  35<L>  11-27          REVIEW OF SYSTEMS:  CONSTITUTIONAL: No fever or fatigue O/N.   EYES: No eye pain, visual disturbances  ENMT:  No difficulty hearing. No throat pain  NECK: No pain or stiffness  RESPIRATORY: No cough, wheezing; No shortness of breath  CARDIOVASCULAR: No chest pain, palpitations.   GASTROINTESTINAL: Pt reports ___ passing gas. No bowel movements. No abdominal or epigastric pain. No nausea, vomiting. GENITOURINARY: No dysuria, frequency, or incontinence. ___ Arias on ___  NEUROLOGICAL: headaches, ____ loss of strength, ____ numbness, or tremors. No dizziness or lightheadedness with pain medications.   MUSCULOSKELETAL: Incisional back pain. ___ radicular pain in ___ pattern. ____ muscle stiffness/cramping No joint pain or swelling.      FUNCTIONAL ASSESSMENT:  PAIN SCORE AT REST:         SCALE USED: (1-10 VNRS)  PAIN SCORE WITH ACTIVITY:         SCALE USED: (1-10 VNRS)    PAIN ASSESSMENT:    PHYSICAL EXAM  GENERAL: NAD  HEAD:  Atraumatic, Normocephalic  NECK: Supple, ___ collar  NERVOUS SYSTEM:    Alert & Oriented X3, Good concentration;   Cranial nerves grossly intact  Motor exam:         [] Upper extremity            Bi(c5)  WE(c6)  EE(c7)   FF(c8)                                                R         5/5        5/5        5/5       5/5                                               L          5/5        5/5        5/5       5/5         [] Lower extremeity          HF(l2)   KE(l3)    TA(l4)   EHL(l5)  GS(s1)                                                 R        5/5        5/5        5/5       5/5         5/5                                               L         5/5        5/5       5/5       5/5          5/5                                                        [] warm well perfused; capillary refill <3 seconds     Sensation intact to LT in UE/LE in 3 dermatomes    Cervical: No facet tenderness. Negative Spurlings sign. Negative Putnam's sign. Cervical ROM not assessed, s/p surgery and restricted turning.    Lumbar: Incisional tenderness. Neg SLR __ . Negative XSLR ___ . Lumbar ROM not assessed, s/p surgery and restricted turning.    CHEST/LUNG: Clear to auscultation bilaterally; No rales, rhonchi, wheezing, or rubs  HEART: Regular rate and rhythm; No murmurs, rubs, or gallops  ABDOMEN: Soft, Nontender, Nondistended; Bowel sounds present  EXTREMITIES:  2+ Peripheral Pulses, No clubbing, cyanosis, or edema  SKIN: No rashes or lesions. Incision _____.      ASSESSMENT:       PLAN:   1. Opioids  Since yesterday 6am, pt has required:     PCA Setting:   - Opioids:   - Initial Bolus:   - Initial Demand:   - Lockout:   - Continuous Rate:   - 4 hour limit:     PLAN: C/w . Transition to . LA not required. Pt receiving adequete coverage. First step. For rescue dosing, will order .      2. Neuropathics  Pt currently denies neuropathic pain. No numbness/tingling/electric shock like sensation in LE/UE b.l.    PLAN: No indication for neuropathic agents.     3. Adjuvants  Pt ___ complaining of muscle spasms/cramping in neck/back. Tylenol 650mg q6h PRN for Mild Pain. Pt on/not on Percocet.     PLAN: C/w     4. Prophylactic:   Bowel regimen: Docusate Senna;    Nausea PRN: Zofran PRN for Nausea, pt does not c/o current    5. Functional Goals:   Pt will get OOB with PT today. Pt will resume previous level of activity without impairment from surgery.     6. Additional Consults:   None recommended.     7. Additional Labs/Imaging:   None recommended.     8. Follow up, Discharge Planning:   Patient is set for discharge to:   Discharge is pending:   Pain Management follow up plan: NEUROSURGERY PAIN MANAGEMENT PROGRESS NOTE    OVERNIGHT EVENTS:  - Pt continues to complain of malaise, severe headaches, pt states that it feels like her "head is on fire"    PLAN/RECOMMENDATIONS:  - Continue to recommend trial of Fioricet during day for headache  - Addition of Robaxin 500mg TID PRN for Stiffness/Spasms  - Hold off on addition of Movantik 25mcg dosing daily for prevention of opioid induced constipation. Interaction with Voriconazole, increases concentration of naloxegol and could perpetuate withdrawal symptoms.   - Bowel Regimen plan: PO Dulcolax 10mg daily at night, Relistor 8mg tomorrow AM if no bowel movement, hold off on Miralax for now    HPI:  32[place] with PMH of lumbar disc herniation (s/p epidural injection on 8/17) who was at [facility] on 11/13 for severe HA partially relieved by lying down, neck pain, fever, weakness, myalgias, back pain radiating to bl thighs.     Pt reports first experiencing similar symptoms 10 days ago (went to [facility] in [place] - neg w/u: LP, lumbar MRI w/contr, head CT, West Nile virus - discharged w/supportive tx + fiorocet).     Pt then went to Dr Carty and was sent to ED.  At [facility], neg MRI brain, neg RVP, neg BCx, neg HIV, evaluated by ID and Neuro - discharged home and felt better until 11/14 in pm - worsening of headache, photophobia and neck stiffness. She denied any other symptoms.  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   Denied: recent travel, IV drug use, unprotected intercourse, any rashes or bites, invasive procedures other than epidural injection in 08/2017.  In the ER, pt s/p IV hydromorphone and IVF, w/partial relief of headache; neck stiffness, photophobia, chills. ROS otherwise negative. Requesting food.  VS in ED: T(F): 101.5  HR: 108  BP: 89/57 RR: 16 SpO2: 98% (15 Nov 2017 19:50)      PAST MEDICAL & SURGICAL HISTORY:  Lumbar disc herniation  No significant past surgical history      FAMILY HISTORY:  No pertinent family history in first degree relatives      Allergies    aspirin (Hives; Swelling)  ibuprofen (Rash; Flushing; Hives)  lidocaine (Short breath)    Intolerances        PAIN MEDICATIONS:  acetaminophen   Tablet 650 milliGRAM(s) Oral every 6 hours PRN  acetaminophen   Tablet. 650 milliGRAM(s) Oral every 6 hours PRN  acetaminophen 325 mG/butalbital 50 mG/caffeine 40 mG 1 Tablet(s) Oral once PRN  diphenhydrAMINE   Capsule 25 milliGRAM(s) Oral at bedtime PRN  methocarbamol 500 milliGRAM(s) Oral every 8 hours PRN  ondansetron    Tablet 4 milliGRAM(s) Oral every 8 hours PRN  oxyCODONE    IR 10 milliGRAM(s) Oral every 4 hours PRN  oxyCODONE    IR 5 milliGRAM(s) Oral every 4 hours PRN    Heme:    Antibiotics:  voriconazole 250 milliGRAM(s) Oral every 12 hours    Cardiovascular:    GI:  aluminum hydroxide/magnesium hydroxide/simethicone Suspension 30 milliLiter(s) Oral every 4 hours PRN  bisacodyl Suppository 10 milliGRAM(s) Rectal daily PRN  pantoprazole    Tablet 40 milliGRAM(s) Oral before breakfast  polyethylene glycol 3350 17 Gram(s) Oral every 12 hours  senna 2 Tablet(s) Oral at bedtime    Endocrine:  dexamethasone     Tablet   Oral   dexamethasone     Tablet 2 milliGRAM(s) Oral every 6 hours  dextrose 50% Injectable 12.5 Gram(s) IV Push once  dextrose 50% Injectable 25 Gram(s) IV Push once  dextrose 50% Injectable 25 Gram(s) IV Push once  dextrose Gel 1 Dose(s) Oral once PRN  glucagon  Injectable 1 milliGRAM(s) IntraMuscular once PRN  insulin lispro (HumaLOG) corrective regimen sliding scale   SubCutaneous Before meals and at bedtime    All Other Medications:  dextrose 5%. 1000 milliLiter(s) IV Continuous <Continuous>  influenza   Vaccine 0.5 milliLiter(s) IntraMuscular once      Vital Signs Last 24 Hrs  T(C): 37.3 (27 Nov 2017 09:03), Max: 39.4 (27 Nov 2017 04:34)  T(F): 99.2 (27 Nov 2017 09:03), Max: 102.9 (27 Nov 2017 04:34)  HR: 84 (27 Nov 2017 09:03) (84 - 102)  BP: 104/64 (27 Nov 2017 09:03) (93/55 - 112/61)  BP(mean): --  RR: 18 (27 Nov 2017 09:03) (18 - 20)  SpO2: 96% (27 Nov 2017 09:03) (94% - 98%)    LABS:                        12.7   13.8  )-----------( 257      ( 27 Nov 2017 07:04 )             37.8     11-27    132<L>  |  92<L>  |  12  ----------------------------<  136<H>  4.4   |  27  |  0.70    Ca    8.6      27 Nov 2017 07:04  Mg     2.4     11-27    TPro  6.0  /  Alb  3.2<L>  /  TBili  0.3  /  DBili  x   /  AST  15  /  ALT  63<H>  /  AlkPhos  35<L>  11-27          REVIEW OF SYSTEMS:  CONSTITUTIONAL: + fever & fatigue O/N.   EYES: ++ periorbital eye pain, ++ visual disturbances  ENMT:  No difficulty hearing. No throat pain  NECK: Sig. cont. stiffness in neck and shoulders + sensation of lockjaw   RESPIRATORY: No cough, wheezing; No shortness of breath  CARDIOVASCULAR: No chest pain, palpitations.   GASTROINTESTINAL: Sig. difficult with BM, pt apprehensive of pain medications d/t OIC. Plan for change of regimen today. No nausea, vomiting. GENITOURINARY: No dysuria, frequency, or incontinence.NEUROLOGICAL: Severe headaches, constant. No LOS, numbness, or tremors. No dizziness or lightheadedness with pain medications.   MUSCULOSKELETAL: Negligible lumbar pain, given worsening headachel       FUNCTIONAL ASSESSMENT:  PAIN SCORE AT REST:   10/10      SCALE USED: (1-10 VNRS)  PAIN SCORE WITH ACTIVITY:  Increases if no pain regimen        SCALE USED: (1-10 VNRS)    PAIN ASSESSMENT:  Pt reporting 10/10 severe throbbing pain this AM. Pt states that lumbar back is not bothering her. Denies changes in pattern of pain just worsening severity. Now worse since downtitration of Decadron.      - Pt c/o worsening sharp diffuse headache and persistent periorbital pressure like sensation that is constant, with associated sharp sensation with flexion of neck  - C/o worsening stiffness/nucal rigidity and sensation of "locked jaw"  - Pt c/o sig. opioid induced constipation & associated discomfort    PHYSICAL EXAM  GENERAL: Ill appearing, laying in bed with icepacks  NECK: Tense, + Brudzinski  NERVOUS SYSTEM:    Alert & Oriented X3, Good concentration;   Cranial nerves grossly intact  Motor exam:  ROTH x 4, 5/5 in 4 extr.   SKIN: No rashes or lesions.    ASSESSMENT:   32F w/PMH HNP s/p epidural injection 8/17 p/w worsening headache, photophobia, neck stiffness, myalgias, fever, weakness, bl shooting back pain, concerning for infectious meningitis,  NOW pending workup, downtitration of steroids, c/o worsening headache since Friday.    RECOMMENDATIONS:   1. Opioids  Since yesterday 6am, pt has required:   2x 5mg Oxy-IR dosing, slightly improved since yesterday  - C/w current regimen, trial Fioricet for HA today, primary prior to Oxy-IR.    2. Neuropathics  Pt currently denies neuropathic pain. No numbness/tingling/electric shock like sensation in LE/UE b.l.  - No indication for neuropathic agents.     3. Adjuvants  Pt is complaining of muscle spasms/cramping in neck. Tylenol 650mg q6h PRN for Mild Pain. Pt on Oxy-IR not Percocet.   - Start Robaxin, low dose, as needed. TDD<4g/hr    4. Prophylactic:   Bowel regimen: Docusate Senna;  No Movantik for now, Relistor tomorrow if no BM.  Dulcolax PO 10mg qnight, hold if BM past 24 hours.  Nausea PRN: Zofran PRN for Nausea, pt does not c/o current    5. Functional Goals:   Pt will get OOB independently today. Pt will resume previous level of activity without impairment from pain.     6. Additional Consults:   None recommended.     7. Additional Labs/Imaging:   None recommended.     8. Follow up, Discharge Planning:   Patient is set for discharge to: Pending eval  Discharge is pending: Pending eval  Pain Management follow up plan: C/t f/u inpatient/outpatient.

## 2017-11-27 NOTE — CONSULT NOTE ADULT - ASSESSMENT
The patient has symptoms and signs of pinching of the spinal cord or nerve roots in the high lumbar area.  The patient has biological evidence for meningitis.  The patient has constitutional complaints suggestive of an infection.  The patient's platelets which are large and clumped appear to be secondary to an inflammatory  response to infection.   The red blood cell morphology with polychromasia and altered size and shape may be related to an infectious process. The possibility of a mold infection in the epidural corticosteroids is a possibility.
32yoF with suspected meningitis with headache, photophobia, eye pressure    - Vision intact. Color plates normal. Intraocular pressure within normal limits. No optic disc edema/papilledema. No retinal hemorrhages.  - Patients symptoms of photophobia, eye pressure, and headache likely due to meningitis.  - Supportive care with shades for photophobia.  - Please reconsult as needed.
31 yo F initially with BLE pain, fever, then HA with initial nl LP on 11/9, then meningitis on 11/15 with CSF parameters c/c bacterial or fungal picture.  Suspect infection may have been introduced at time of LP but that wouldn't explain prior symptoms.  If so, would expect Staph aureus as most common cause and this organism is not in the PCR but should have seen on gram stain or culture.  Considered introduction due to lumbar infection related to an indolent organism (eg fungal, Nocardia, etc - micro can be diverse) as a result of epidural injection, as initial LP was done at same location but would have expected evidence for infection on the MRI - now negative X 2.   If meningitis due to epidural steroid injection itself, would have expected to see positive initial CSF, though could have been lab error resulting in false negative.    Separately, she could be at risk for Listeria but should have shown in PCR - now neg X 2 and her CSF picture has dramatically improved without treatment targeting this organism.  None of the common meningitis pathogens were seen by PCR (including Neisseria meningitidis).  Clinical picture is atypical for Lyme.  Though she is quite uncomfortable b/o HA and photophobia, her mental status is normal and she does not appear acutely ill.  Suggest:  - F/U results of fungal, viral, AFB cultures from CSF, as well as new bacterial, Lyme studies - would send as CSF IgG/IgM to look at index as PCR is not a great test  - Agree with gallium scan  - Continue vancomycin and ceftriaxone for now, as CSF parameters have improved on this tx.  Recommendations discussed with Dr. Miller.
33 YO W SEVERE HEAD/NECK / SPINE PAIN PARTIALLY RESPONSIVE TO STEROIDS AND THEN WORSE W TAPER OF STEROIDS  SOME FORM OF MENINGEAL IRRITATION OR NERVE ROOT IRRITATION  NO SIGNS ON MRI OF MARROW EDEMA OR INFLAMMATION OF LIGAMENTS  THE POS LUPUS ANTICOAGULANT IS NOT SPECIFIC FOR LUPUS AND CAN BE ASSOCIATED W CLOT FORMATION OR SPONT. ABORTIONS AND STROKE.    FUO  INFLAMMATORY SPINE DISEASE POSSIBLE BUT DOES NOT USUALLY GIVE FEVER  POSSIBLE IBD   HX OF PSORIASIS AS CHILE NOT ACTIVE NOW  FUNGAL CULTURES STILL PENDING    WILL DISCUSS W DR RUELAS    SUGGEST GET ANCA AB'S  HLA B27  SSA AND SSB RO/LA

## 2017-11-27 NOTE — PROGRESS NOTE ADULT - SUBJECTIVE AND OBJECTIVE BOX
The patient feels poorly since the Decadron has been weaned.  She is increasingly photophobic with increasing Meningitic complaints.    Physical exam reveals a well-developed and well-nourished woman in pain line in a Darkened room with her mother not attendance (her mother is a neurosurgical trauma nurse).  Her vital signs are within normal limits.  The patient is remarkably photophobic.  Examination of her head, ears, eyes, nose, throat is unremarkable although there is nuchal rigidity.  There is no lymphadenopathy, jugular venous distention or thyroidomegaly in the neck.  The lungs are clear to percussion and auscultation.  There is no axillary or breast pathology bilaterally.  The heart sounds are regular with no auscultatory evidence for murmur, rub,, gallop or ectopy.  The patient has no truncal obesity and there is no palpable or percussible hepatomegaly or splenomegaly.  The patient has no chronic venous stasis changes in the lower extremities.  Neurologically the patient has hypoesthesia in an L12 L3 distribution with positive straight leg raises.  The patient has nuchal rigidity. These meningeal signs however are much Worse than they were when last evaluated by me last week.

## 2017-11-27 NOTE — PROGRESS NOTE ADULT - SUBJECTIVE AND OBJECTIVE BOX
SUBJECTIVE / INTERVAL HPI: Patient seen and examined at bedside.     VITAL SIGNS:  Vital Signs Last 24 Hrs  T(C): 37.3 (27 Nov 2017 09:03), Max: 39.4 (27 Nov 2017 04:34)  T(F): 99.2 (27 Nov 2017 09:03), Max: 102.9 (27 Nov 2017 04:34)  HR: 84 (27 Nov 2017 09:03) (84 - 102)  BP: 104/64 (27 Nov 2017 09:03) (93/55 - 112/61)  BP(mean): --  RR: 18 (27 Nov 2017 09:03) (18 - 20)  SpO2: 96% (27 Nov 2017 09:03) (94% - 96%)    PHYSICAL EXAM:    General: WDWN  HEENT: NC/AT; PERRL, clear conjunctiva  Neck: supple  Cardiovascular: +S1/S2; RRR  Respiratory: CTA b/l; no W/R/R  Gastrointestinal: soft, NT/ND; +BSx4  Extremities: WWP; 2+ peripheral pulses; no edema   Neurological: AAOx3; no focal deficits    MEDICATIONS:  MEDICATIONS  (STANDING):  bisacodyl 10 milliGRAM(s) Oral at bedtime  dexamethasone     Tablet   Oral   dexamethasone     Tablet 2 milliGRAM(s) Oral every 6 hours  dextrose 5%. 1000 milliLiter(s) (50 mL/Hr) IV Continuous <Continuous>  dextrose 50% Injectable 12.5 Gram(s) IV Push once  dextrose 50% Injectable 25 Gram(s) IV Push once  dextrose 50% Injectable 25 Gram(s) IV Push once  influenza   Vaccine 0.5 milliLiter(s) IntraMuscular once  insulin lispro (HumaLOG) corrective regimen sliding scale   SubCutaneous Before meals and at bedtime  pantoprazole    Tablet 40 milliGRAM(s) Oral before breakfast  senna 2 Tablet(s) Oral at bedtime  voriconazole 250 milliGRAM(s) Oral every 12 hours    MEDICATIONS  (PRN):  acetaminophen   Tablet 650 milliGRAM(s) Oral every 6 hours PRN For Temp greater than 38 C (100.4 F)  acetaminophen   Tablet. 650 milliGRAM(s) Oral every 6 hours PRN Mild Pain (1 - 3)  acetaminophen 325 mG/butalbital 50 mG/caffeine 40 mG 1 Tablet(s) Oral once PRN HA  aluminum hydroxide/magnesium hydroxide/simethicone Suspension 30 milliLiter(s) Oral every 4 hours PRN Dyspepsia  bisacodyl Suppository 10 milliGRAM(s) Rectal daily PRN Constipation  dextrose Gel 1 Dose(s) Oral once PRN Blood Glucose LESS THAN 70 milliGRAM(s)/deciliter  diphenhydrAMINE   Capsule 25 milliGRAM(s) Oral at bedtime PRN Rash and/or Itching  glucagon  Injectable 1 milliGRAM(s) IntraMuscular once PRN Glucose LESS THAN 70 milligrams/deciliter  methocarbamol 500 milliGRAM(s) Oral every 8 hours PRN Muscle stiffness/spasms  ondansetron    Tablet 4 milliGRAM(s) Oral every 8 hours PRN Nausea  oxyCODONE    IR 10 milliGRAM(s) Oral every 4 hours PRN Severe Pain (7 - 10)  oxyCODONE    IR 5 milliGRAM(s) Oral every 4 hours PRN Moderate Pain (4 - 6)      ALLERGIES:  Allergies    aspirin (Hives; Swelling)  ibuprofen (Rash; Flushing; Hives)  lidocaine (Short breath)    Intolerances        LABS:                        12.7   13.8  )-----------( 257      ( 27 Nov 2017 07:04 )             37.8     11-27    132<L>  |  92<L>  |  12  ----------------------------<  136<H>  4.4   |  27  |  0.70    Ca    8.6      27 Nov 2017 07:04  Mg     2.4     11-27    TPro  6.0  /  Alb  3.2<L>  /  TBili  0.3  /  DBili  x   /  AST  15  /  ALT  63<H>  /  AlkPhos  35<L>  11-27        CAPILLARY BLOOD GLUCOSE      POCT Blood Glucose.: 223 mg/dL (27 Nov 2017 12:00)      RADIOLOGY & ADDITIONAL TESTS: Reviewed. SUBJECTIVE / INTERVAL HPI: Febrile Patient seen and examined at bedside.     VITAL SIGNS:  Vital Signs Last 24 Hrs  T(C): 37.3 (27 Nov 2017 09:03), Max: 39.4 (27 Nov 2017 04:34)  T(F): 99.2 (27 Nov 2017 09:03), Max: 102.9 (27 Nov 2017 04:34)  HR: 84 (27 Nov 2017 09:03) (84 - 102)  BP: 104/64 (27 Nov 2017 09:03) (93/55 - 112/61)  BP(mean): --  RR: 18 (27 Nov 2017 09:03) (18 - 20)  SpO2: 96% (27 Nov 2017 09:03) (94% - 96%)    PHYSICAL EXAM:    General: WDWN  HEENT: NC/AT; PERRL, clear conjunctiva  Neck: supple  Cardiovascular: +S1/S2; RRR  Respiratory: CTA b/l; no W/R/R  Gastrointestinal: soft, NT/ND; +BSx4  Extremities: WWP; 2+ peripheral pulses; no edema   Neurological: AAOx3; no focal deficits    MEDICATIONS:  MEDICATIONS  (STANDING):  bisacodyl 10 milliGRAM(s) Oral at bedtime  dexamethasone     Tablet   Oral   dexamethasone     Tablet 2 milliGRAM(s) Oral every 6 hours  dextrose 5%. 1000 milliLiter(s) (50 mL/Hr) IV Continuous <Continuous>  dextrose 50% Injectable 12.5 Gram(s) IV Push once  dextrose 50% Injectable 25 Gram(s) IV Push once  dextrose 50% Injectable 25 Gram(s) IV Push once  influenza   Vaccine 0.5 milliLiter(s) IntraMuscular once  insulin lispro (HumaLOG) corrective regimen sliding scale   SubCutaneous Before meals and at bedtime  pantoprazole    Tablet 40 milliGRAM(s) Oral before breakfast  senna 2 Tablet(s) Oral at bedtime  voriconazole 250 milliGRAM(s) Oral every 12 hours    MEDICATIONS  (PRN):  acetaminophen   Tablet 650 milliGRAM(s) Oral every 6 hours PRN For Temp greater than 38 C (100.4 F)  acetaminophen   Tablet. 650 milliGRAM(s) Oral every 6 hours PRN Mild Pain (1 - 3)  acetaminophen 325 mG/butalbital 50 mG/caffeine 40 mG 1 Tablet(s) Oral once PRN HA  aluminum hydroxide/magnesium hydroxide/simethicone Suspension 30 milliLiter(s) Oral every 4 hours PRN Dyspepsia  bisacodyl Suppository 10 milliGRAM(s) Rectal daily PRN Constipation  dextrose Gel 1 Dose(s) Oral once PRN Blood Glucose LESS THAN 70 milliGRAM(s)/deciliter  diphenhydrAMINE   Capsule 25 milliGRAM(s) Oral at bedtime PRN Rash and/or Itching  glucagon  Injectable 1 milliGRAM(s) IntraMuscular once PRN Glucose LESS THAN 70 milligrams/deciliter  methocarbamol 500 milliGRAM(s) Oral every 8 hours PRN Muscle stiffness/spasms  ondansetron    Tablet 4 milliGRAM(s) Oral every 8 hours PRN Nausea  oxyCODONE    IR 10 milliGRAM(s) Oral every 4 hours PRN Severe Pain (7 - 10)  oxyCODONE    IR 5 milliGRAM(s) Oral every 4 hours PRN Moderate Pain (4 - 6)      ALLERGIES:  Allergies    aspirin (Hives; Swelling)  ibuprofen (Rash; Flushing; Hives)  lidocaine (Short breath)    Intolerances        LABS:                        12.7   13.8  )-----------( 257      ( 27 Nov 2017 07:04 )             37.8     11-27    132<L>  |  92<L>  |  12  ----------------------------<  136<H>  4.4   |  27  |  0.70    Ca    8.6      27 Nov 2017 07:04  Mg     2.4     11-27    TPro  6.0  /  Alb  3.2<L>  /  TBili  0.3  /  DBili  x   /  AST  15  /  ALT  63<H>  /  AlkPhos  35<L>  11-27        CAPILLARY BLOOD GLUCOSE      POCT Blood Glucose.: 223 mg/dL (27 Nov 2017 12:00)      RADIOLOGY & ADDITIONAL TESTS: Reviewed. SUBJECTIVE / INTERVAL HPI: Febrile again over the weekend. Tmax 102.9 this morning. Patient seen and examined at bedside. Continues to report nuchal stiffness and photophobia.    VITAL SIGNS:  Vital Signs Last 24 Hrs  T(C): 37.3 (27 Nov 2017 09:03), Max: 39.4 (27 Nov 2017 04:34)  T(F): 99.2 (27 Nov 2017 09:03), Max: 102.9 (27 Nov 2017 04:34)  HR: 84 (27 Nov 2017 09:03) (84 - 102)  BP: 104/64 (27 Nov 2017 09:03) (93/55 - 112/61)  BP(mean): --  RR: 18 (27 Nov 2017 09:03) (18 - 20)  SpO2: 96% (27 Nov 2017 09:03) (94% - 96%)    PHYSICAL EXAM:    General: WDWN, resting in bed comfortably, NAD.  HEENT: NC/AT; PERRL, clear conjunctiva. No photophobia when flashlight shined in eyes.  Neck: Minimal neck stiffness.  Respiratory: CTA b/l. No w/r/r.   Cardiovascular: +S1/S2; RRR. No m/r/g.  Abdomen: soft, NT/ND; +BS x4  Extremities: WWP, 2+ peripheral pulses b/l; no LE edema  Skin: normal color and turgor; no rash  Neurological: AAOx3. EOMI. No focal deficits. Strength/sensation 5/5 throughout. Hyperreflexia lower extremity bilaterally. Babinski negative. Mildly +Kernig. Mildly +Brudzinski.      MEDICATIONS:  MEDICATIONS  (STANDING):  bisacodyl 10 milliGRAM(s) Oral at bedtime  dexamethasone     Tablet   Oral   dexamethasone     Tablet 2 milliGRAM(s) Oral every 6 hours  dextrose 5%. 1000 milliLiter(s) (50 mL/Hr) IV Continuous <Continuous>  dextrose 50% Injectable 12.5 Gram(s) IV Push once  dextrose 50% Injectable 25 Gram(s) IV Push once  dextrose 50% Injectable 25 Gram(s) IV Push once  influenza   Vaccine 0.5 milliLiter(s) IntraMuscular once  insulin lispro (HumaLOG) corrective regimen sliding scale   SubCutaneous Before meals and at bedtime  pantoprazole    Tablet 40 milliGRAM(s) Oral before breakfast  senna 2 Tablet(s) Oral at bedtime  voriconazole 250 milliGRAM(s) Oral every 12 hours    MEDICATIONS  (PRN):  acetaminophen   Tablet 650 milliGRAM(s) Oral every 6 hours PRN For Temp greater than 38 C (100.4 F)  acetaminophen   Tablet. 650 milliGRAM(s) Oral every 6 hours PRN Mild Pain (1 - 3)  acetaminophen 325 mG/butalbital 50 mG/caffeine 40 mG 1 Tablet(s) Oral once PRN HA  aluminum hydroxide/magnesium hydroxide/simethicone Suspension 30 milliLiter(s) Oral every 4 hours PRN Dyspepsia  bisacodyl Suppository 10 milliGRAM(s) Rectal daily PRN Constipation  dextrose Gel 1 Dose(s) Oral once PRN Blood Glucose LESS THAN 70 milliGRAM(s)/deciliter  diphenhydrAMINE   Capsule 25 milliGRAM(s) Oral at bedtime PRN Rash and/or Itching  glucagon  Injectable 1 milliGRAM(s) IntraMuscular once PRN Glucose LESS THAN 70 milligrams/deciliter  methocarbamol 500 milliGRAM(s) Oral every 8 hours PRN Muscle stiffness/spasms  ondansetron    Tablet 4 milliGRAM(s) Oral every 8 hours PRN Nausea  oxyCODONE    IR 10 milliGRAM(s) Oral every 4 hours PRN Severe Pain (7 - 10)  oxyCODONE    IR 5 milliGRAM(s) Oral every 4 hours PRN Moderate Pain (4 - 6)      ALLERGIES:  Allergies    aspirin (Hives; Swelling)  ibuprofen (Rash; Flushing; Hives)  lidocaine (Short breath)    Intolerances        LABS:                        12.7   13.8  )-----------( 257      ( 27 Nov 2017 07:04 )             37.8     11-27    132<L>  |  92<L>  |  12  ----------------------------<  136<H>  4.4   |  27  |  0.70    Ca    8.6      27 Nov 2017 07:04  Mg     2.4     11-27    TPro  6.0  /  Alb  3.2<L>  /  TBili  0.3  /  DBili  x   /  AST  15  /  ALT  63<H>  /  AlkPhos  35<L>  11-27        CAPILLARY BLOOD GLUCOSE      POCT Blood Glucose.: 223 mg/dL (27 Nov 2017 12:00)      RADIOLOGY & ADDITIONAL TESTS: Reviewed.

## 2017-11-27 NOTE — PROGRESS NOTE ADULT - PROBLEM SELECTOR PLAN 1
Pt with previously negative workup, now with PMN predominance on CSF and s/s of meningitis. Pt w/Tmax 103.5 and LP s/f neutrophilic predominance, suggestive of bacterial etiology; however, CSF PCR panel, CSF bacterial cultures and gram stain here and from [place] are neg.  - Decadron taper 5-4-3-2-1. Daily.  - All medications PO now, IV holiday. No IV access currently.  - Dr. Osborne following for pain management, appreciate recs.  - Dr. Miller following and coordinating outside lab studies.  - BCx and CSF bacteria cultures, fungitell negative NGTD

## 2017-11-27 NOTE — CONSULT NOTE ADULT - SUBJECTIVE AND OBJECTIVE BOX
33 yo female w less than 1 month of illness characterized by severe headache weakness high fever and spine pain after a HNP treated with epidural injection in Aug 17 2017.  Sx's did not begin until early NOV. Feels achy all over but pain mainly up and down her spine.  Admitted x 2 here to [facility].  All cultures so far and blood studies for infection have been neg.  Spinal tap x 3 with elevated WBC's  nucleated x 2 here.  Treatment w IV antiBx no help in symptoms.    Hx of PsO as child No IBD but sister was Dx'd w Crohn;'s but later changed to IBS.  NO rashes but endorses some hair loss.  No active PsO now  No Hx of clots or DVT    HEENT  eyes closed.  Parotid sens  but not enlarged or submandib sl  sens  Neck dec ROM due to pain  Lungs clear  HS reg no tachycardia  Abd sl bloated  constip  Skin neg  Joints  no swelling or tenderness  in UE joints   LE  L knee sl swollen but not warm or tender  SPINE  Is tender along entire spine from cervical to sacrum and tender over SI joints as well.  Moves extremely slowly w pain      LABS  ESR 32   CRP NORMAL    POS LA AND NEG HE AND ANTI DNA AB'S  ACE NEG  IMMUNO FIX NEG  GALLIUM W UPTAKE IN LACRIMALS AND PAROTIDS

## 2017-11-27 NOTE — CONSULT NOTE ADULT - ATTENDING COMMENTS
I PERSONALLY INTERVIEWED PATIENT AND HER MOTHER
The patient should be continued on antibiotics recommended by infectious disease.  The patient should have an MRI of the entire cervical, thoracic or lumbar and sacral spines.  The patient should have a bone scan or gallium scan to look for altered tissue function.  I will follow with interest.  I can be reached at [phone].

## 2017-11-27 NOTE — PROGRESS NOTE ADULT - PROBLEM SELECTOR PLAN 3
F: no IVF  E: Replete PRN  N: Reg diet  PPX: No VTE ppx indicated, IMPROVE score 0    Full Code  DISPO: Pending clinical improvement. F: no IVF  E: Replete PRN  N: Reg diet  PPX: No VTE ppx indicated, IMPROVE score 0    Full Code  DISPO:Requesting transfer to [place]. Transfer initiated, pending auth.

## 2017-11-27 NOTE — PROGRESS NOTE ADULT - ASSESSMENT
The patient is currently getting worse symptomatically although there appears to be less inflammatory changes in the spinal fluid.  Constitutionally she is worse despite the voriconazole.  The hematologic changes are consistent with infection or inflammation.

## 2017-11-27 NOTE — PROGRESS NOTE ADULT - ASSESSMENT
32F w/PMH lumbar disk herniation s/p epidural injection 8/17 and recent neg w/u for similar presentation at [facility] (11/6) and [facility] (11/14) presenting with worsening headache, photophobia, neck stiffness, myalgias, fever, weakness, bl shooting back pain, now with PMN predominance on LP but no identified organisms on GS and negative PCR, concerning for infectious meningitis, but cultures NGTD. PCR negative. Many CSF studies still pending. Negative so far. Gallium scan negative. Patient reports continued fevers and chills and hallucinations after initiation of voriconazole.     It is possible that this patient does have a fungal meningitis as fungal cultures take a long time to grow although fungitell negative, bacterial is less likely as the cultures have been negative. I believe that the patient has a viral meningitis which has been slow to resolve. The fevers do persist, but clinically the patient is getting better with improvement in her overall meningeal symptoms. Patient wishes to re-initiate transfer to [place].

## 2017-11-28 DIAGNOSIS — E87.1 HYPO-OSMOLALITY AND HYPONATREMIA: ICD-10-CM

## 2017-11-28 LAB
ALBUMIN SERPL ELPH-MCNC: 3.4 G/DL — SIGNIFICANT CHANGE UP (ref 3.3–5)
ALP SERPL-CCNC: 33 U/L — LOW (ref 40–120)
ALT FLD-CCNC: 45 U/L — SIGNIFICANT CHANGE UP (ref 10–45)
ANION GAP SERPL CALC-SCNC: 12 MMOL/L — SIGNIFICANT CHANGE UP (ref 5–17)
AST SERPL-CCNC: 12 U/L — SIGNIFICANT CHANGE UP (ref 10–40)
B19V IGG SER-ACNC: 4.4 INDEX — HIGH (ref 0–0.8)
B19V IGG+IGM SER-IMP: POSITIVE
B19V IGG+IGM SER-IMP: SIGNIFICANT CHANGE UP
B19V IGM FLD-ACNC: 0.2 INDEX — SIGNIFICANT CHANGE UP (ref 0–0.8)
B19V IGM SER-ACNC: NEGATIVE — SIGNIFICANT CHANGE UP
BILIRUB SERPL-MCNC: 0.4 MG/DL — SIGNIFICANT CHANGE UP (ref 0.2–1.2)
BUN SERPL-MCNC: 15 MG/DL — SIGNIFICANT CHANGE UP (ref 7–23)
CALCIUM SERPL-MCNC: 8.6 MG/DL — SIGNIFICANT CHANGE UP (ref 8.4–10.5)
CHLORIDE SERPL-SCNC: 91 MMOL/L — LOW (ref 96–108)
CO2 SERPL-SCNC: 28 MMOL/L — SIGNIFICANT CHANGE UP (ref 22–31)
CREAT SERPL-MCNC: 0.67 MG/DL — SIGNIFICANT CHANGE UP (ref 0.5–1.3)
GLUCOSE BLDC GLUCOMTR-MCNC: 115 MG/DL — HIGH (ref 70–99)
GLUCOSE BLDC GLUCOMTR-MCNC: 160 MG/DL — HIGH (ref 70–99)
GLUCOSE BLDC GLUCOMTR-MCNC: 177 MG/DL — HIGH (ref 70–99)
GLUCOSE SERPL-MCNC: 126 MG/DL — HIGH (ref 70–99)
HCT VFR BLD CALC: 39.3 % — SIGNIFICANT CHANGE UP (ref 34.5–45)
HGB BLD-MCNC: 13.1 G/DL — SIGNIFICANT CHANGE UP (ref 11.5–15.5)
MAGNESIUM SERPL-MCNC: 2.4 MG/DL — SIGNIFICANT CHANGE UP (ref 1.6–2.6)
MCHC RBC-ENTMCNC: 29 PG — SIGNIFICANT CHANGE UP (ref 27–34)
MCHC RBC-ENTMCNC: 33.3 G/DL — SIGNIFICANT CHANGE UP (ref 32–36)
MCV RBC AUTO: 86.9 FL — SIGNIFICANT CHANGE UP (ref 80–100)
OSMOLALITY UR: 536 MOSMOL/KG — SIGNIFICANT CHANGE UP (ref 100–650)
PLATELET # BLD AUTO: 223 K/UL — SIGNIFICANT CHANGE UP (ref 150–400)
POTASSIUM SERPL-MCNC: 4.7 MMOL/L — SIGNIFICANT CHANGE UP (ref 3.5–5.3)
POTASSIUM SERPL-SCNC: 4.7 MMOL/L — SIGNIFICANT CHANGE UP (ref 3.5–5.3)
PROT SERPL-MCNC: 6.2 G/DL — SIGNIFICANT CHANGE UP (ref 6–8.3)
RBC # BLD: 4.52 M/UL — SIGNIFICANT CHANGE UP (ref 3.8–5.2)
RBC # FLD: 12.4 % — SIGNIFICANT CHANGE UP (ref 10.3–16.9)
SODIUM SERPL-SCNC: 131 MMOL/L — LOW (ref 135–145)
SODIUM UR-SCNC: 70 MMOL/L — SIGNIFICANT CHANGE UP
VIRUS SPEC CULT: SIGNIFICANT CHANGE UP
WBC # BLD: 13.5 K/UL — HIGH (ref 3.8–10.5)
WBC # FLD AUTO: 13.5 K/UL — HIGH (ref 3.8–10.5)

## 2017-11-28 PROCEDURE — 99356: CPT

## 2017-11-28 PROCEDURE — 99223 1ST HOSP IP/OBS HIGH 75: CPT

## 2017-11-28 RX ORDER — OXYCODONE HYDROCHLORIDE 5 MG/1
5 TABLET ORAL EVERY 4 HOURS
Qty: 0 | Refills: 0 | Status: DISCONTINUED | OUTPATIENT
Start: 2017-11-28 | End: 2017-11-29

## 2017-11-28 RX ADMIN — Medication 2 MILLIGRAM(S): at 07:22

## 2017-11-28 RX ADMIN — Medication 2: at 13:37

## 2017-11-28 RX ADMIN — METHOCARBAMOL 500 MILLIGRAM(S): 500 TABLET, FILM COATED ORAL at 19:12

## 2017-11-28 RX ADMIN — Medication 650 MILLIGRAM(S): at 02:15

## 2017-11-28 RX ADMIN — Medication 650 MILLIGRAM(S): at 11:38

## 2017-11-28 RX ADMIN — Medication 650 MILLIGRAM(S): at 22:13

## 2017-11-28 RX ADMIN — PANTOPRAZOLE SODIUM 40 MILLIGRAM(S): 20 TABLET, DELAYED RELEASE ORAL at 07:22

## 2017-11-28 RX ADMIN — Medication 2 MILLIGRAM(S): at 02:19

## 2017-11-28 RX ADMIN — VORICONAZOLE 250 MILLIGRAM(S): 10 INJECTION, POWDER, LYOPHILIZED, FOR SOLUTION INTRAVENOUS at 11:40

## 2017-11-28 RX ADMIN — Medication 1 MILLIGRAM(S): at 19:12

## 2017-11-28 RX ADMIN — Medication 2: at 18:31

## 2017-11-28 RX ADMIN — OXYCODONE HYDROCHLORIDE 5 MILLIGRAM(S): 5 TABLET ORAL at 00:20

## 2017-11-28 RX ADMIN — SENNA PLUS 2 TABLET(S): 8.6 TABLET ORAL at 22:35

## 2017-11-28 RX ADMIN — Medication 325 MILLIGRAM(S): at 21:50

## 2017-11-28 RX ADMIN — Medication 1 MILLIGRAM(S): at 13:37

## 2017-11-28 NOTE — CONSULT NOTE ADULT - CONSULT REASON
second opinion
meningitis
C/o severe headache/low back pain s/p Lumbar tap
Eye pressure, headache, meningitis
Headache
Patient with a history of severe back pain and headache who now presents with intermittent anemia and abnormal red cell and platelet morphology.
Second opinion
R? auto immune disease

## 2017-11-28 NOTE — PROGRESS NOTE ADULT - SUBJECTIVE AND OBJECTIVE BOX
Neurology Follow up note    Name  JIM WILKINS    HPI:  32[place] with PMH of lumbar disc herniation (s/p epidural injection on 8/17) who was at [facility] on 11/13 for severe HA partially relieved by lying down, neck pain, fever, weakness, myalgias, back pain radiating to bl thighs.     Pt reports first experiencing similar symptoms 10 days ago (went to [facility] in [place] - neg w/u: LP, lumbar MRI w/contr, head CT, West Nile virus - discharged w/supportive tx + fiorocet).     Pt then went to Dr Carty and was sent to ED.  At [facility], neg MRI brain, neg RVP, neg BCx, neg HIV, evaluated by ID and Neuro - discharged home and felt better until 11/14 in pm - worsening of headache, photophobia and neck stiffness. She denied any other symptoms.  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   Denied: recent travel, IV drug use, unprotected intercourse, any rashes or bites, invasive procedures other than epidural injection in 08/2017.  In the ER, pt s/p IV hydromorphone and IVF, w/partial relief of headache; neck stiffness, photophobia, chills. ROS otherwise negative. Requesting food.  VS in ED: T(F): 101.5  HR: 108  BP: 89/57 RR: 16 SpO2: 98% (15 Nov 2017 19:50)      Interval History -no change in neuro status- no change in headaches        REVIEW OF SYSTEMS    Vital Signs Last 24 Hrs  T(C): 37.9 (28 Nov 2017 05:41), Max: 38.5 (27 Nov 2017 17:16)  T(F): 100.3 (28 Nov 2017 05:41), Max: 101.3 (27 Nov 2017 17:16)  HR: 82 (28 Nov 2017 05:41) (81 - 97)  BP: 102/62 (28 Nov 2017 05:41) (100/65 - 114/70)  BP(mean): --  RR: 16 (28 Nov 2017 05:41) (16 - 18)  SpO2: 96% (28 Nov 2017 05:41) (96% - 96%)    Physical Exam-     Mental Status-awake and alert    Cranial Nerves-full EOM    Gait and station-n/a    Motor-moves all 4 extremities    Reflexes-nl    Sensation-nl    Coordination-nl    Vascular -    Medications  acetaminophen   Tablet 650 milliGRAM(s) Oral every 6 hours PRN  acetaminophen   Tablet. 650 milliGRAM(s) Oral every 6 hours PRN  aluminum hydroxide/magnesium hydroxide/simethicone Suspension 30 milliLiter(s) Oral every 4 hours PRN  bisacodyl 10 milliGRAM(s) Oral at bedtime  bisacodyl Suppository 10 milliGRAM(s) Rectal daily PRN  dexamethasone     Tablet   Oral   dexamethasone     Tablet 1 milliGRAM(s) Oral every 6 hours  dextrose 5%. 1000 milliLiter(s) IV Continuous <Continuous>  dextrose 50% Injectable 12.5 Gram(s) IV Push once  dextrose 50% Injectable 25 Gram(s) IV Push once  dextrose 50% Injectable 25 Gram(s) IV Push once  dextrose Gel 1 Dose(s) Oral once PRN  diphenhydrAMINE   Capsule 25 milliGRAM(s) Oral at bedtime PRN  glucagon  Injectable 1 milliGRAM(s) IntraMuscular once PRN  influenza   Vaccine 0.5 milliLiter(s) IntraMuscular once  insulin lispro (HumaLOG) corrective regimen sliding scale   SubCutaneous Before meals and at bedtime  methocarbamol 500 milliGRAM(s) Oral every 8 hours PRN  ondansetron    Tablet 4 milliGRAM(s) Oral every 8 hours PRN  oxyCODONE    IR 10 milliGRAM(s) Oral every 4 hours PRN  oxyCODONE    IR 5 milliGRAM(s) Oral every 4 hours PRN  pantoprazole    Tablet 40 milliGRAM(s) Oral before breakfast  senna 2 Tablet(s) Oral at bedtime  voriconazole 250 milliGRAM(s) Oral every 12 hours      Lab      Radiology    Assessment- Meningeal disease- await cultures     Plan spoke yo Dr Gauthier

## 2017-11-28 NOTE — PROGRESS NOTE ADULT - SUBJECTIVE AND OBJECTIVE BOX
The patient is somewhat better today with less photophobia.  She continues to have meningeal signs from the neck to her feet.    Physical exam reveals a well-developed and well-nourished woman in pain line in a darkened room with her mother in attendance (her mother is a neurosurgical trauma nurse).  Her vital signs are within normal limits.  The patient is less photophobic.  Examination of her head, ears, eyes, nose, throat is unremarkable although there is nuchal rigidity.  There is no lymphadenopathy, jugular venous distention or thyroidomegaly in the neck.  The lungs are clear to percussion and auscultation.  There is no axillary or breast pathology bilaterally.  The heart sounds are regular with no auscultatory evidence for murmur, rub,, gallop or ectopy.  The patient has no truncal obesity and there is no palpable or percussible hepatomegaly or splenomegaly.  The patient has no chronic venous stasis changes in the lower extremities.  Neurologically the patient has hypoesthesia in an L12 L3 distribution on the left side with positive straight leg raises.  The patient has nuchal rigidity. These meningeal signs however are Somewhat better than yesterday.

## 2017-11-28 NOTE — PROGRESS NOTE ADULT - PROBLEM SELECTOR PLAN 1
Workup to date negative.  - Decadron taper.  - All medications PO now, IV holiday. No IV access currently.  - Dr. Osborne following for pain management, appreciate recs.  - Dr. Miller following and coordinating outside lab studies.  - BCx and CSF bacteria cultures, fungitell negative NGTD  - f/u studies as they result.

## 2017-11-28 NOTE — CONSULT NOTE ADULT - SUBJECTIVE AND OBJECTIVE BOX
HPI:  32-year-old woman with a past medical history of lumbar disc herniation (s/p epidural injection in 8/2017 for back pain) who was at [facility] on 11/13/17 for severe HA partially relieved by lying down, neck pain, fever, weakness, myalgias, back pain radiating to the bilateral thighs.  Pt reports first experiencing similar symptoms 10 days prior (went to [facility] in [place] - negative work-up including:  LP, MRI Lumbar w/contrast, CT Head, West Nile virus - discharged w/supportive tx + Fioricet).  Pt then went to Dr Carty and was sent to ED.  MRI brain w contrast was negative, neg BCx, neg HIV ab, evaluated by ID and Dr. Carty - discharged home and felt better until 11/14 in pm - worsening of headache, photophobia and neck stiffness.  She denied any other symptoms.  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      At [facility], exam with meningismus.  On the floor, seen by ID, neuro, rheum.  Continued to have intermittent fevers of unclear etiology. Had another LP 11/15/17 which showed pleocytosis with neutrophilic predominance (>1500 cells), so was felt to have likely a viral meningitis.        PAST MEDICAL & SURGICAL HISTORY:  Lumbar disc herniation s/p ADOLFO in 8/2017  No significant past surgical history    MEDICATIONS  (STANDING): reviewed    ALLERGIES:   Aspirin (Hives; Swelling)  Ibuprofen (Rash; Flushing; Hives)  Lidocaine (Short breath)    FAMILY HISTORY:  No pertinent family history in first degree relatives.    SOCIAL HISTORY:  Living Situation:  Lives in [place].  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  Tobacco:  Denies.  Alcohol:  Social.  Drug use:  Denies.    VITAL SIGNS: reviewed    PHYSICAL EXAMINATION:        ___________________    LABS: mild leukocytosis (on corticosteroid), mild hyponatremia.      ESR 16 (H) [<= 15 mm/hr]  CRP 1.20 (H) [0.00 - 0.40 mg/dL]  HIV 1/2 Nonreactive  HE negative  ACE negative    11/15/2017  CSF WBC 1615 - 1742 (H) [0 - 5/uL] [N: 86%; L: 10%; M: 4%]  CSF RBC 2- 128 (H) [0 - 0/uL]  CSF Protein 111 (H) [15 - 45 mg/dL]  CSF Glucose 55 [40 - 70 mg/dL]  CSF  U/L  CSF Culture No growth to date.  CSF Gram stain: Many white blood cells. No organisms seen.  CSF PCR Not Detected (Escherichia coli; Haemophilus influenzae; Listeria monocytogenes; Neisseria meningitidis; Streptococcus agalactiae; Streptococcus pneumoniae; CMV; Enterovirus; HSV-1; HSV-2; Human herpesvirus 6; Parechovirus; VZV and Cryptococcus)    11/17/17  CSF Protein 42 [15 - 45 mg/dL]  CSF Glucose 63 [40 - 70 mg/dL]  WBC 74 (63 of those are lymphocytes)  RBC 38  cultures negative  fungitel negative  normal igg index    RADIOLOGY & ADDITIONAL STUDIES:      11/13/2017 MRI Brain without contrast:  Normal.    11/16/2017 MRI Brain with and without contrast:  Unremarkable.    11/2017 L+T+C spine - meningeal enhancement in thoracic and lumbar spine. reviewed.    Gallium scan - unremarkable    IMPRESSION & PLAN:    Ms. Barragan is a 32-year-old woman with a PMHx of lumbar disc herniation s/p epidural steroid injection in 8/2017 who presents with severe head/neck pain, fever, weakness, myalgias, and back pain radiating to the bilateral thighs. I was consulted for the question of autoimmune encephalitis. Initial CSF analysis was highly consistent with infectious etiology, with a significant pleocytosis that is highly atypical (although not impossible) for any autoimmune encephalitis.  Repeat LP shows improvement in pleocytosis, this time with a lymphocytic pattern.    - agree with sending autoimmune encephalopathy panel to [place]. HPI:  32-year-old woman with a past medical history of lumbar disc herniation (s/p epidural injection in 8/2017 for back pain) who was at [facility] on 11/13/17 for severe HA partially relieved by lying down, neck pain, fever, weakness, myalgias, back pain radiating to the bilateral thighs.  Pt reports first experiencing similar symptoms 10 days prior (went to [facility] in [place] - negative work-up including:  LP, MRI Lumbar w/contrast, CT Head, West Nile virus - discharged w/supportive tx + Fioricet).  Pt then went to Dr Carty and was sent to ED.  MRI brain w contrast was negative, neg BCx, neg HIV ab, evaluated by ID and Dr. Carty - discharged home and felt better until 11/14 in pm - worsening of headache, photophobia and neck stiffness. 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      At [facility], exam with meningismus.  On the floor, seen by ID, neuro, rheum.  Continued to have intermittent fevers of unclear etiology. Had another LP 11/15/17 which showed pleocytosis with neutrophilic predominance (>1500 cells), so was felt to have likely a bacterial vs viral meningitis.  However, cultures were negative and there was no sign of fungal etiology.  Viral cultures were unrevealing.  Repeat LP several days later showed substantial decrease in pleocytosis, this time with a lymphocytic predominance, suggesting a viral meningitis.     Over the past several days, pt had been experiencing some intermittent hallucinations, described as flashes of seeing something that is not there.  This was, per the mother at bedside, temporally correlated with the pt having been on IV voriconazole.  However, she switched to oral voriconazole over the past several days and, while hallucinations were improved, they were not completely resolved.  This AM, pt suddenly thought she had been kidnapped, but then quickly realized this was not the case.  Voriconazole was dc'd today.  Pt, however, is still on a decadron taper, still getting oxycodone, and still having frequent fevers.    Per pt's mother, pt is definitely improving symptomatically day by day.  However, still having persistent fevers. Pt reports the HAs and light sensitivity is improved.    PAST MEDICAL & SURGICAL HISTORY:  Lumbar disc herniation s/p ADOLFO in 8/2017  No significant past surgical history    MEDICATIONS  (STANDING): reviewed    ALLERGIES:   Aspirin (Hives; Swelling)  Ibuprofen (Rash; Flushing; Hives)  Lidocaine (Short breath)    FAMILY HISTORY:  No pertinent family history in first degree relatives.    SOCIAL HISTORY:  Living Situation:  Lives in [place].  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  Tobacco:  Denies.  Alcohol:  Social.  Drug use:  Denies.    VITAL SIGNS: reviewed, persistently febrile.    PHYSICAL EXAMINATION:    pt is awake but slightly drowsy, normally conversant, fluent, appears in some distress due to headache and fever.  follows commands on exam.  perrl, eomi, vff to finger counting, face symm, tup ml  5/5 throughout, normal tone.  sens intact to all modalities on all 4.  dtrs are present and normal, symmetric throughout, downgoing toes BL.  coord intact to FTN and Angie  gait deferred.    LABS: mild leukocytosis (on corticosteroid), mild hyponatremia.      ESR 16 (H) [<= 15 mm/hr]  CRP 1.20 (H) [0.00 - 0.40 mg/dL]  HIV 1/2 Nonreactive  HE negative  ACE negative    11/15/2017  CSF WBC 1615 - 1742 (H) [0 - 5/uL] [N: 86%; L: 10%; M: 4%]  CSF RBC 2- 128 (H) [0 - 0/uL]  CSF Protein 111 (H) [15 - 45 mg/dL]  CSF Glucose 55 [40 - 70 mg/dL]  CSF  U/L  CSF Culture No growth to date.  CSF Gram stain: Many white blood cells. No organisms seen.  CSF PCR Not Detected (Escherichia coli; Haemophilus influenzae; Listeria monocytogenes; Neisseria meningitidis; Streptococcus agalactiae; Streptococcus pneumoniae; CMV; Enterovirus; HSV-1; HSV-2; Human herpesvirus 6; Parechovirus; VZV and Cryptococcus)    11/17/17  CSF Protein 42 [15 - 45 mg/dL]  CSF Glucose 63 [40 - 70 mg/dL]  WBC 74 (63 of those are lymphocytes)  RBC 38  cultures negative  fungitel negative  normal igg index    RADIOLOGY & ADDITIONAL STUDIES:      11/13/2017 MRI Brain without contrast:  Normal.    11/16/2017 MRI Brain with and without contrast:  Unremarkable.    11/2017 L+T+C spine - meningeal enhancement in thoracic and lumbar spine. reviewed.    Gallium scan - unremarkable    IMPRESSION & PLAN:    Ms. Barragan is a 32-year-old woman with a PMHx of lumbar disc herniation s/p epidural steroid injection in 8/2017 who presented with severe head/neck pain, fever, weakness, myalgias, and back pain radiating to the bilateral thighs, found to have aseptic meningitis.    Per pt's mother, pt is definitely improving symptomatically day by day.  However, still having persistent fevers and occ hallcuinations. Pt reports the HAs and light sensitivity is improved.  I believe that the hallucinations may be related to the voriconazole which was stopped today.  There is also a small chance that the persistent fevers may also be related to the voriconazole.    I was consulted for the question of autoimmune encephalitis. Pt's presentation is more consistent with a meningitis than with an encephalitis, with no personality changes or psychosis other than the hallucinations, which can be explained by a drug effect, and with a normal brain MRI.  Initial CSF analysis was highly consistent with infectious etiology, with a significant pleocytosis that is highly atypical (although not impossible) for any autoimmune encephalitis.  Repeat LP shows improvement in pleocytosis, this time with a lymphocytic pattern.  Given the fact that AI encephalitis is a rare entity, the fact that the clinical presentation is not highly suggestive of it, and the fact that the initial CSF results are highly atypical for it, I think the likelihood of AI encephalitis is very low.  The likelihood would be even lower if the serum AI encephalitis panel is negative.    - agree with sending serum autoimmune encephalopathy panel to [place].  I do not have a high enough suspicion to recommend a repeat LP for CSF encephalitis at this time.  - would cont with steroid taper; however, will monitor clinical status carefully.  - will monitor now that voriconazole has been dc'd.  - will follow.

## 2017-11-28 NOTE — PROGRESS NOTE ADULT - ASSESSMENT
ASEPTIC meningitis w unknown cause   waiting for fungal cultures/ tests  POS LUPUS ANTICOAG    POSS VASCULITIS BUT NO DEFINITE TESTS SUGGESTIVE OF SAME  UNLIKELY BEHCET'S  NO ULCERS    SUGGEST  REPEAT ESR CRP  ANCA   ANTICARDIOLIPIN AB'S   ANTIPHOSHOLIPID AB's

## 2017-11-28 NOTE — PROGRESS NOTE ADULT - PROBLEM SELECTOR PLAN 3
F: no IVF  E: Replete PRN  N: Reg diet  PPX: No VTE ppx indicated, IMPROVE score 0    Full Code  DISPO:Requesting transfer to [place]. Transfer initiated, pending auth. Na 131 today. Downtrending.   - f/u urine lytes.

## 2017-11-28 NOTE — PROGRESS NOTE ADULT - SUBJECTIVE AND OBJECTIVE BOX
NEUROSURGERY PAIN MANAGEMENT PROGRESS NOTE    OVERNIGHT EVENTS:  - Pt did not spike fever overnight, workup still negative  - Rheumatology consulted  - Trial of Fioricet yesterday    PLAN/RECOMMENDATIONS:  - Fioricet for HA primary during day vs. Oxy IR, pt requesting no use during night d/t caffeine . Start Fioricet 1 tab for HA q6h PRN.  - Continue with current regimen, no requirement for Relistor today given BM large yesterday  - Continue to monitor for requirement for Relistor  - Encourage trial of Robaxin, started yesterday at low dose, as needed. Pt states that HAs have improved, reports that primary source of pain is neck & jaw stiffness.    HPI:  32F [place] with PMH of lumbar disc herniation (s/p epidural injection on 8/17) who was at [facility] on 11/13 for severe HA partially relieved by lying down, neck pain, fever, weakness, myalgias, back pain radiating to bl thighs.     Pt reports first experiencing similar symptoms 10 days ago (went to [facility] in [place] - neg w/u: LP, lumbar MRI w/contr, head CT, West Nile virus - discharged w/supportive tx + fiorocet).     Pt then went to Dr Carty and was sent to ED.  At [facility], neg MRI brain, neg RVP, neg BCx, neg HIV, evaluated by ID and Neuro - discharged home and felt better until 11/14 in pm - worsening of headache, photophobia and neck stiffness. She denied any other symptoms.  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   Denied: recent travel, IV drug use, unprotected intercourse, any rashes or bites, invasive procedures other than epidural injection in 08/2017.  In the ER, pt s/p IV hydromorphone and IVF, w/partial relief of headache; neck stiffness, photophobia, chills. ROS otherwise negative. Requesting food.  VS in ED: T(F): 101.5  HR: 108  BP: 89/57 RR: 16 SpO2: 98% (15 Nov 2017 19:50)      PAST MEDICAL & SURGICAL HISTORY:  Lumbar disc herniation  No significant past surgical history      FAMILY HISTORY:  No pertinent family history in first degree relatives      Allergies    aspirin (Hives; Swelling)  ibuprofen (Rash; Flushing; Hives)  lidocaine (Short breath)    Intolerances        PAIN MEDICATIONS:  acetaminophen   Tablet 650 milliGRAM(s) Oral every 6 hours PRN  acetaminophen   Tablet. 650 milliGRAM(s) Oral every 6 hours PRN  diphenhydrAMINE   Capsule 25 milliGRAM(s) Oral at bedtime PRN  methocarbamol 500 milliGRAM(s) Oral every 8 hours PRN  ondansetron    Tablet 4 milliGRAM(s) Oral every 8 hours PRN  oxyCODONE    IR 10 milliGRAM(s) Oral every 4 hours PRN  oxyCODONE    IR 5 milliGRAM(s) Oral every 4 hours PRN    Heme:    Antibiotics:  voriconazole 250 milliGRAM(s) Oral every 12 hours    Cardiovascular:    GI:  aluminum hydroxide/magnesium hydroxide/simethicone Suspension 30 milliLiter(s) Oral every 4 hours PRN  bisacodyl 10 milliGRAM(s) Oral at bedtime  bisacodyl Suppository 10 milliGRAM(s) Rectal daily PRN  pantoprazole    Tablet 40 milliGRAM(s) Oral before breakfast  senna 2 Tablet(s) Oral at bedtime    Endocrine:  dexamethasone     Tablet   Oral   dexamethasone     Tablet 1 milliGRAM(s) Oral every 6 hours  dextrose 50% Injectable 12.5 Gram(s) IV Push once  dextrose 50% Injectable 25 Gram(s) IV Push once  dextrose 50% Injectable 25 Gram(s) IV Push once  dextrose Gel 1 Dose(s) Oral once PRN  glucagon  Injectable 1 milliGRAM(s) IntraMuscular once PRN  insulin lispro (HumaLOG) corrective regimen sliding scale   SubCutaneous Before meals and at bedtime    All Other Medications:  dextrose 5%. 1000 milliLiter(s) IV Continuous <Continuous>  influenza   Vaccine 0.5 milliLiter(s) IntraMuscular once      Vital Signs Last 24 Hrs  T(C): 38.5 (28 Nov 2017 11:25), Max: 38.5 (27 Nov 2017 17:16)  T(F): 101.3 (28 Nov 2017 11:25), Max: 101.3 (27 Nov 2017 17:16)  HR: 98 (28 Nov 2017 11:25) (81 - 98)  BP: 101/66 (28 Nov 2017 11:25) (95/63 - 114/70)  BP(mean): --  RR: 16 (28 Nov 2017 11:25) (16 - 18)  SpO2: 97% (28 Nov 2017 11:25) (96% - 98%)    LABS:                        13.1   13.5  )-----------( 223      ( 28 Nov 2017 07:58 )             39.3     11-28    131<L>  |  91<L>  |  15  ----------------------------<  126<H>  4.7   |  28  |  0.67    Ca    8.6      28 Nov 2017 07:58  Mg     2.4     11-28    TPro  6.2  /  Alb  3.4  /  TBili  0.4  /  DBili  x   /  AST  12  /  ALT  45  /  AlkPhos  33<L>  11-28        REVIEW OF SYSTEMS:  CONSTITUTIONAL: + fever & fatigue O/N.   EYES: ++ periorbital eye pain, ++ visual disturbances  NECK: Primary pain related to stiffness in neck and shoulders & sensation of lockjaw   RESPIRATORY: No cough, wheezing; No shortness of breath  CARDIOVASCULAR: No chest pain, palpitations.   GASTROINTESTINAL: BM yesterday, no req. for Dulcolax.  No nausea, vomiting.  GENITOURINARY: No dysuria, frequency, or incontinence.  NEUROLOGICAL: Improved headaches, less intense this AM. No LOS, numbness, or tremors. No dizziness or lightheadedness with pain medications.   MUSCULOSKELETAL: Negligible lumbar pain, given worsening headache      FUNCTIONAL ASSESSMENT:  PAIN SCORE AT REST:   6/10      SCALE USED: (1-10 VNRS)  PAIN SCORE WITH ACTIVITY:  Only if not premedicated SCALE USED: (1-10 VNRS)    PAIN ASSESSMENT:  Pt reporting improvement in severe throbbing headache pain this AM. Pt states that lumbar back is not bothering her. Reporting primary neck stiffness/spasms pain.     - Pt c/o worsening sharp diffuse headache and persistent periorbital pressure like sensation that is constant, with associated sharp sensation with flexion of neck  - C/o worsening stiffness/nucal rigidity and sensation of "locked jaw"  - Pt c/o sig. opioid induced constipation & associated discomfort    PHYSICAL EXAM  GENERAL: Ill appearing, laying in bed with icepacks  NECK: Tense, + Brudzinski  NERVOUS SYSTEM:    Alert & Oriented X3, Good concentration;   Cranial nerves grossly intact  Motor exam:  ROTH x 4, 5/5 in 4 extr.   SKIN: No rashes or lesions.    ASSESSMENT:   32F w/PMH HNP s/p epidural injection 8/17 p/w worsening headache, photophobia, neck stiffness, myalgias, fever, weakness, bl shooting back pain, concerning for infectious meningitis,  NOW pending workup, downtitration of steroids, c/o worsening headache since Friday.    RECOMMENDATIONS:   1. Opioids  Since yesterday 6am, pt has required:   1x 5mg Oxy-IR dosing, Pt reports that HA has sig. improved since yesterday  - C/w current regimen, addition of Fioricet as primary agent for HAs prior to Oxy-IR given plan to decrease opioid burden given high tendency for OIC.    2. Neuropathics  Pt currently denies neuropathic pain. No numbness/tingling/electric shock like sensation in LE/UE b.l.  - No indication for neuropathic agents.     3. Adjuvants  Pt is complaining primarily of muscle cramping in neck & jaw this AM. Tylenol 650mg q6h PRN for Mild Pain. Pt on Oxy-IR not Percocet.   - Encourage trial of Robaxin, started yesterday at low dose, as needed. TDD<4g/hr    4. Prophylactic:   Bowel regimen: Docusate Senna;  No Movantik for now, Relistor tomorrow if no BM.  Dulcolax PO 10mg qnight, hold if BM past 24 hours.  Nausea PRN: Zofran PRN for Nausea, pt does not c/o current    5. Functional Goals:   Pt will get OOB independently today. Pt will resume previous level of activity without impairment from pain.     6. Additional Consults:   None recommended.     7. Additional Labs/Imaging:   None recommended.     8. Follow up, Discharge Planning:   Patient is set for discharge to: Pending eval  Discharge is pending: Pending eval, possible transfer to [place]  Pain Management follow up plan: C/t f/u inpatient/outpatient.

## 2017-11-28 NOTE — CONSULT NOTE ADULT - CONSULT REQUESTED DATE/TIME
17-Nov-2017 16:17
28-Nov-2017 16:34
15-Nov-2017 16:46
16-Nov-2017
17-Nov-2017
17-Nov-2017 09:00
17-Nov-2017 14:00
27-Nov-2017 18:10

## 2017-11-28 NOTE — CHART NOTE - NSCHARTNOTEFT_GEN_A_CORE
Admitting Diagnosis:   Patient is a 32y old  Female who presents with a chief complaint of headache (22 Nov 2017 14:41)      PAST MEDICAL & SURGICAL HISTORY:  Lumbar disc herniation  No significant past surgical history      Current Nutrition Order:  Regular      PO Intake: Good (%) [ X  ]  Fair (50-75%) [   ] Poor (<25%) [   ]- mother bringing in food from outside     GI Issues: Intermittent nausea- relieved with zofran, Opioid induced constipation- on bowel regimen, will add prunes    Pain: Severe neck and jaw pain, decreased back pain- rheumatology following     Skin Integrity: Intact    Labs:   11-28    131<L>  |  91<L>  |  15  ----------------------------<  126<H>  4.7   |  28  |  0.67    Ca    8.6      28 Nov 2017 07:58  Mg     2.4     11-28    TPro  6.2  /  Alb  3.4  /  TBili  0.4  /  DBili  x   /  AST  12  /  ALT  45  /  AlkPhos  33<L>  11-28    CAPILLARY BLOOD GLUCOSE      POCT Blood Glucose.: 160 mg/dL (28 Nov 2017 12:28)  POCT Blood Glucose.: 115 mg/dL (28 Nov 2017 07:38)  POCT Blood Glucose.: 191 mg/dL (27 Nov 2017 21:15)  POCT Blood Glucose.: 131 mg/dL (27 Nov 2017 17:15)      Medications:  MEDICATIONS  (STANDING):  bisacodyl 10 milliGRAM(s) Oral at bedtime  dexamethasone     Tablet   Oral   dexamethasone     Tablet 1 milliGRAM(s) Oral every 6 hours  dextrose 5%. 1000 milliLiter(s) (50 mL/Hr) IV Continuous <Continuous>  dextrose 50% Injectable 12.5 Gram(s) IV Push once  dextrose 50% Injectable 25 Gram(s) IV Push once  dextrose 50% Injectable 25 Gram(s) IV Push once  influenza   Vaccine 0.5 milliLiter(s) IntraMuscular once  insulin lispro (HumaLOG) corrective regimen sliding scale   SubCutaneous Before meals and at bedtime  pantoprazole    Tablet 40 milliGRAM(s) Oral before breakfast  senna 2 Tablet(s) Oral at bedtime    MEDICATIONS  (PRN):  acetaminophen   Tablet 650 milliGRAM(s) Oral every 6 hours PRN For Temp greater than 38 C (100.4 F)  acetaminophen   Tablet. 650 milliGRAM(s) Oral every 6 hours PRN Mild Pain (1 - 3)  aluminum hydroxide/magnesium hydroxide/simethicone Suspension 30 milliLiter(s) Oral every 4 hours PRN Dyspepsia  bisacodyl Suppository 10 milliGRAM(s) Rectal daily PRN Constipation  dextrose Gel 1 Dose(s) Oral once PRN Blood Glucose LESS THAN 70 milliGRAM(s)/deciliter  diphenhydrAMINE   Capsule 25 milliGRAM(s) Oral at bedtime PRN Rash and/or Itching  glucagon  Injectable 1 milliGRAM(s) IntraMuscular once PRN Glucose LESS THAN 70 milligrams/deciliter  methocarbamol 500 milliGRAM(s) Oral every 8 hours PRN Muscle stiffness/spasms  ondansetron    Tablet 4 milliGRAM(s) Oral every 8 hours PRN Nausea  oxyCODONE    IR 5 milliGRAM(s) Oral every 4 hours PRN Moderate Pain (4 - 6)  oxyCODONE    IR 10 milliGRAM(s) Oral every 4 hours PRN Severe Pain (7 - 10)      Weight: 62.6kg   Height: 64"    Weight Change: No new weights recorded     Estimated energy needs: ABW used for calculations as pt between % of IBW.   Calories: 25-30 kcal/kg = 2431-7886 kcal/day  Protein: 0.8-1.0 g/kg = 48-63 g protein/day  Fluids: 25-30 mL/kg = 4297-6920 mL/day    Subjective: 31 yo/female presents w/ aseptic meningitis (confirmed not bacterial, less likely fungal) w/ persistent fevers of unclear etiology and severe neck/jaw pain. Pt visited in room, asleep, left to rest. Discussed with RN who reports that mother is bringing in food for the patient, though unsure how much she is eating. Per MD note, complaining of opioid induced constipation. On bowel regimen. Continues to have intermittent nausea and severe pain. Transfer to [facility]  re-initiating, awaiting bed.     Previous Nutrition Diagnosis: No nutrition diagnosis at this time     Active [   ]  Resolved [  X ]    If resolved, new PES: Decreased GI motility RT opioid usage AEB constipation, lack of BM for 3 days     Goal: Pt will have bowel movement within 24 hours     Recommendations:  Continue with current diet order  Add prunes/prune juice to assist in relieving constipation    Education: N/A at this time 2/2 pt in pain     Risk Level: High [   ] Moderate [ X  ] Low [   ] Admitting Diagnosis:   Patient is a 32y old  Female who presents with a chief complaint of headache (22 Nov 2017 14:41)      PAST MEDICAL & SURGICAL HISTORY:  Lumbar disc herniation  No significant past surgical history      Current Nutrition Order:  Regular      PO Intake: Good (%) [   ]  Fair (50-75%) [ X  ] Poor (<25%) [   ]- mother bringing in food from outside that patient is tolerating     GI Issues: Intermittent nausea, lack of appetite- relieved with zofran, Opioid induced constipation- on bowel regimen, will add prunes    Pain: Severe neck and jaw pain, decreased back pain- rheumatology following     Skin Integrity: Intact    Labs:   11-28    131<L>  |  91<L>  |  15  ----------------------------<  126<H>  4.7   |  28  |  0.67    Ca    8.6      28 Nov 2017 07:58  Mg     2.4     11-28    TPro  6.2  /  Alb  3.4  /  TBili  0.4  /  DBili  x   /  AST  12  /  ALT  45  /  AlkPhos  33<L>  11-28    CAPILLARY BLOOD GLUCOSE      POCT Blood Glucose.: 160 mg/dL (28 Nov 2017 12:28)  POCT Blood Glucose.: 115 mg/dL (28 Nov 2017 07:38)  POCT Blood Glucose.: 191 mg/dL (27 Nov 2017 21:15)  POCT Blood Glucose.: 131 mg/dL (27 Nov 2017 17:15)      Medications:  MEDICATIONS  (STANDING):  bisacodyl 10 milliGRAM(s) Oral at bedtime  dexamethasone     Tablet   Oral   dexamethasone     Tablet 1 milliGRAM(s) Oral every 6 hours  dextrose 5%. 1000 milliLiter(s) (50 mL/Hr) IV Continuous <Continuous>  dextrose 50% Injectable 12.5 Gram(s) IV Push once  dextrose 50% Injectable 25 Gram(s) IV Push once  dextrose 50% Injectable 25 Gram(s) IV Push once  influenza   Vaccine 0.5 milliLiter(s) IntraMuscular once  insulin lispro (HumaLOG) corrective regimen sliding scale   SubCutaneous Before meals and at bedtime  pantoprazole    Tablet 40 milliGRAM(s) Oral before breakfast  senna 2 Tablet(s) Oral at bedtime    MEDICATIONS  (PRN):  acetaminophen   Tablet 650 milliGRAM(s) Oral every 6 hours PRN For Temp greater than 38 C (100.4 F)  acetaminophen   Tablet. 650 milliGRAM(s) Oral every 6 hours PRN Mild Pain (1 - 3)  aluminum hydroxide/magnesium hydroxide/simethicone Suspension 30 milliLiter(s) Oral every 4 hours PRN Dyspepsia  bisacodyl Suppository 10 milliGRAM(s) Rectal daily PRN Constipation  dextrose Gel 1 Dose(s) Oral once PRN Blood Glucose LESS THAN 70 milliGRAM(s)/deciliter  diphenhydrAMINE   Capsule 25 milliGRAM(s) Oral at bedtime PRN Rash and/or Itching  glucagon  Injectable 1 milliGRAM(s) IntraMuscular once PRN Glucose LESS THAN 70 milligrams/deciliter  methocarbamol 500 milliGRAM(s) Oral every 8 hours PRN Muscle stiffness/spasms  ondansetron    Tablet 4 milliGRAM(s) Oral every 8 hours PRN Nausea  oxyCODONE    IR 5 milliGRAM(s) Oral every 4 hours PRN Moderate Pain (4 - 6)  oxyCODONE    IR 10 milliGRAM(s) Oral every 4 hours PRN Severe Pain (7 - 10)      Weight: 62.6kg   Height: 64"    Weight Change: No new weights recorded     Estimated energy needs: ABW used for calculations as pt between % of IBW.   Calories: 25-30 kcal/kg = 2638-9841 kcal/day  Protein: 0.8-1.0 g/kg = 48-63 g protein/day  Fluids: 25-30 mL/kg = 6115-0940 mL/day    Subjective: 31 yo/female presents w/ aseptic meningitis (confirmed not bacterial, less likely fungal) w/ persistent fevers of unclear etiology and severe neck/jaw pain. Pt visited in room, awake, eating lunch. Pt reports decreased PO intake over past few days 2/2 lack of appetite and nausea. Mother is bringing in food for the patient, and pt tolerating soup, dumplings well. Earlier complaining of opioid induced constipation, had BM this AM. On bowel regimen. Continues to have intermittent nausea and severe pain. Transfer to [facility] re-initiated, awaiting bed.     Previous Nutrition Diagnosis: No nutrition diagnosis at this time     Active [   ]  Resolved [  X ]    If resolved, new PES: Decreased GI motility RT opioid usage AEB constipation, lack of BM for 3 days     Goal: Pt will have bowel movement within 24 hours     Recommendations:  Continue with current diet order  Add prunes/prune juice to assist in relieving constipation    Education: Educated on increased fluid intake     Risk Level: High [   ] Moderate [ X  ] Low [   ]

## 2017-11-28 NOTE — PROGRESS NOTE ADULT - PROBLEM SELECTOR PLAN 4
F: no IVF  E: Replete PRN  N: Reg diet  PPX: No VTE ppx indicated, IMPROVE score 0    Full Code  DISPO:Requesting transfer to [place]. Transfer initiated, pending auth.

## 2017-11-28 NOTE — PROGRESS NOTE ADULT - ASSESSMENT
The patient has meningitis of indeterminant cause.  There will be special molecular studies that will be available this afternoon from the [facility].

## 2017-11-28 NOTE — PROGRESS NOTE ADULT - SUBJECTIVE AND OBJECTIVE BOX
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    She did seem to improve somewhat w steroids then got worse and now getting better again clinically.  She is on 1 mg Dexameth q6h po.  Still has fever to 101.3 max yesterday    REVIEW OF SYSTEMS    Vital Signs Last 24 Hrs  T(C): 37.9 (28 Nov 2017 05:41), Max: 38.5 (27 Nov 2017 17:16)  T(F): 100.3 (28 Nov 2017 05:41), Max: 101.3 (27 Nov 2017 17:16)  HR: 82 (28 Nov 2017 05:41) (81 - 97)  BP: 102/62 (28 Nov 2017 05:41) (100/65 - 114/70)  BP(mean): --  RR: 16 (28 Nov 2017 05:41) (16 - 18)  SpO2: 96% (28 Nov 2017 05:41) (96% - 96%    Medications  acetaminophen   Tablet 650 milliGRAM(s) Oral every 6 hours PRN  acetaminophen   Tablet. 650 milliGRAM(s) Oral every 6 hours PRN  aluminum hydroxide/magnesium hydroxide/simethicone Suspension 30 milliLiter(s) Oral every 4 hours PRN  bisacodyl 10 milliGRAM(s) Oral at bedtime  bisacodyl Suppository 10 milliGRAM(s) Rectal daily PRN  dexamethasone     Tablet   Oral   dexamethasone     Tablet 1 milliGRAM(s) Oral every 6 hours  dextrose 5%. 1000 milliLiter(s) IV Continuous <Continuous>  dextrose 50% Injectable 12.5 Gram(s) IV Push once  dextrose 50% Injectable 25 Gram(s) IV Push once  dextrose 50% Injectable 25 Gram(s) IV Push once  dextrose Gel 1 Dose(s) Oral once PRN  diphenhydrAMINE   Capsule 25 milliGRAM(s) Oral at bedtime PRN  glucagon  Injectable 1 milliGRAM(s) IntraMuscular once PRN  influenza   Vaccine 0.5 milliLiter(s) IntraMuscular once  insulin lispro (HumaLOG) corrective regimen sliding scale   SubCutaneous Before meals and at bedtime  methocarbamol 500 milliGRAM(s) Oral every 8 hours PRN  ondansetron    Tablet 4 milliGRAM(s) Oral every 8 hours PRN  oxyCODONE    IR 10 milliGRAM(s) Oral every 4 hours PRN  oxyCODONE    IR 5 milliGRAM(s) Oral every 4 hours PRN  pantoprazole    Tablet 40 milliGRAM(s) Oral before breakfast  senna 2 Tablet(s) Oral at bedtime  voriconazole 250 milliGRAM(s) Oral every 12 hours     HEENT  no mouth ulcers observed or lip lesions that would  be suggestive of BEHCET"S w aseptic meningitis  Neg still w some nuchal rigidity  Abd softer non tender  JOINTS neg  SKIN neg  SPINE   entire length

## 2017-11-29 VITALS
RESPIRATION RATE: 16 BRPM | HEART RATE: 102 BPM | SYSTOLIC BLOOD PRESSURE: 95 MMHG | DIASTOLIC BLOOD PRESSURE: 65 MMHG | OXYGEN SATURATION: 98 % | TEMPERATURE: 100 F

## 2017-11-29 DIAGNOSIS — R50.9 FEVER, UNSPECIFIED: ICD-10-CM

## 2017-11-29 LAB
GLUCOSE BLDC GLUCOMTR-MCNC: 116 MG/DL — HIGH (ref 70–99)
GLUCOSE BLDC GLUCOMTR-MCNC: 96 MG/DL — SIGNIFICANT CHANGE UP (ref 70–99)

## 2017-11-29 PROCEDURE — 86665 EPSTEIN-BARR CAPSID VCA: CPT

## 2017-11-29 PROCEDURE — 87207 SMEAR SPECIAL STAIN: CPT

## 2017-11-29 PROCEDURE — 74177 CT ABD & PELVIS W/CONTRAST: CPT | Mod: 26

## 2017-11-29 PROCEDURE — 86788 WEST NILE VIRUS AB IGM: CPT

## 2017-11-29 PROCEDURE — 82728 ASSAY OF FERRITIN: CPT

## 2017-11-29 PROCEDURE — 80053 COMPREHEN METABOLIC PANEL: CPT

## 2017-11-29 PROCEDURE — 77003 FLUOROGUIDE FOR SPINE INJECT: CPT

## 2017-11-29 PROCEDURE — 85610 PROTHROMBIN TIME: CPT

## 2017-11-29 PROCEDURE — 85025 COMPLETE CBC W/AUTO DIFF WBC: CPT

## 2017-11-29 PROCEDURE — 62270 DX LMBR SPI PNXR: CPT

## 2017-11-29 PROCEDURE — 84145 PROCALCITONIN (PCT): CPT

## 2017-11-29 PROCEDURE — 87205 SMEAR GRAM STAIN: CPT

## 2017-11-29 PROCEDURE — 85598 HEXAGNAL PHOSPH PLTLT NEUTRL: CPT

## 2017-11-29 PROCEDURE — 87252 VIRUS INOCULATION TISSUE: CPT

## 2017-11-29 PROCEDURE — 83615 LACTATE (LD) (LDH) ENZYME: CPT

## 2017-11-29 PROCEDURE — 86618 LYME DISEASE ANTIBODY: CPT

## 2017-11-29 PROCEDURE — 82945 GLUCOSE OTHER FLUID: CPT

## 2017-11-29 PROCEDURE — 82306 VITAMIN D 25 HYDROXY: CPT

## 2017-11-29 PROCEDURE — 72158 MRI LUMBAR SPINE W/O & W/DYE: CPT

## 2017-11-29 PROCEDURE — 86592 SYPHILIS TEST NON-TREP QUAL: CPT

## 2017-11-29 PROCEDURE — 80202 ASSAY OF VANCOMYCIN: CPT

## 2017-11-29 PROCEDURE — 86038 ANTINUCLEAR ANTIBODIES: CPT

## 2017-11-29 PROCEDURE — A9585: CPT

## 2017-11-29 PROCEDURE — 86140 C-REACTIVE PROTEIN: CPT

## 2017-11-29 PROCEDURE — 86720 LEPTOSPIRA ANTIBODY: CPT

## 2017-11-29 PROCEDURE — 84157 ASSAY OF PROTEIN OTHER: CPT

## 2017-11-29 PROCEDURE — 87798 DETECT AGENT NOS DNA AMP: CPT

## 2017-11-29 PROCEDURE — 86611 BARTONELLA ANTIBODY: CPT

## 2017-11-29 PROCEDURE — 86663 EPSTEIN-BARR ANTIBODY: CPT

## 2017-11-29 PROCEDURE — 36415 COLL VENOUS BLD VENIPUNCTURE: CPT

## 2017-11-29 PROCEDURE — 89051 BODY FLUID CELL COUNT: CPT

## 2017-11-29 PROCEDURE — 72156 MRI NECK SPINE W/O & W/DYE: CPT

## 2017-11-29 PROCEDURE — A9556: CPT

## 2017-11-29 PROCEDURE — 87476 LYME DIS DNA AMP PROBE: CPT

## 2017-11-29 PROCEDURE — 86738 MYCOPLASMA ANTIBODY: CPT

## 2017-11-29 PROCEDURE — 70552 MRI BRAIN STEM W/DYE: CPT

## 2017-11-29 PROCEDURE — 87116 MYCOBACTERIA CULTURE: CPT

## 2017-11-29 PROCEDURE — 87040 BLOOD CULTURE FOR BACTERIA: CPT

## 2017-11-29 PROCEDURE — 86789 WEST NILE VIRUS ANTIBODY: CPT

## 2017-11-29 PROCEDURE — 85670 THROMBIN TIME PLASMA: CPT

## 2017-11-29 PROCEDURE — 82652 VIT D 1 25-DIHYDROXY: CPT

## 2017-11-29 PROCEDURE — 81003 URINALYSIS AUTO W/O SCOPE: CPT

## 2017-11-29 PROCEDURE — 86431 RHEUMATOID FACTOR QUANT: CPT

## 2017-11-29 PROCEDURE — 71045 X-RAY EXAM CHEST 1 VIEW: CPT

## 2017-11-29 PROCEDURE — 99285 EMERGENCY DEPT VISIT HI MDM: CPT | Mod: 25

## 2017-11-29 PROCEDURE — 78815 PET IMAGE W/CT SKULL-THIGH: CPT | Mod: 26

## 2017-11-29 PROCEDURE — 83735 ASSAY OF MAGNESIUM: CPT

## 2017-11-29 PROCEDURE — 78802 RP LOCLZJ TUM WHBDY 1 D IMG: CPT

## 2017-11-29 PROCEDURE — 87102 FUNGUS ISOLATION CULTURE: CPT

## 2017-11-29 PROCEDURE — 82784 ASSAY IGA/IGD/IGG/IGM EACH: CPT

## 2017-11-29 PROCEDURE — A9552: CPT

## 2017-11-29 PROCEDURE — 87449 NOS EACH ORGANISM AG IA: CPT

## 2017-11-29 PROCEDURE — 86757 RICKETTSIA ANTIBODY: CPT

## 2017-11-29 PROCEDURE — 85613 RUSSELL VIPER VENOM DILUTED: CPT

## 2017-11-29 PROCEDURE — 86334 IMMUNOFIX E-PHORESIS SERUM: CPT

## 2017-11-29 PROCEDURE — 87070 CULTURE OTHR SPECIMN AEROBIC: CPT

## 2017-11-29 PROCEDURE — 85730 THROMBOPLASTIN TIME PARTIAL: CPT

## 2017-11-29 PROCEDURE — 83605 ASSAY OF LACTIC ACID: CPT

## 2017-11-29 PROCEDURE — 86747 PARVOVIRUS ANTIBODY: CPT

## 2017-11-29 PROCEDURE — 96374 THER/PROPH/DIAG INJ IV PUSH: CPT | Mod: XU

## 2017-11-29 PROCEDURE — 86235 NUCLEAR ANTIGEN ANTIBODY: CPT

## 2017-11-29 PROCEDURE — 87496 CYTOMEG DNA AMP PROBE: CPT

## 2017-11-29 PROCEDURE — 93005 ELECTROCARDIOGRAM TRACING: CPT | Mod: XU

## 2017-11-29 PROCEDURE — 72157 MRI CHEST SPINE W/O & W/DYE: CPT

## 2017-11-29 PROCEDURE — 96375 TX/PRO/DX INJ NEW DRUG ADDON: CPT | Mod: XU

## 2017-11-29 PROCEDURE — 85652 RBC SED RATE AUTOMATED: CPT

## 2017-11-29 PROCEDURE — 87483 CNS DNA AMP PROBE TYPE 12-25: CPT

## 2017-11-29 PROCEDURE — 80048 BASIC METABOLIC PNL TOTAL CA: CPT

## 2017-11-29 PROCEDURE — 74177 CT ABD & PELVIS W/CONTRAST: CPT

## 2017-11-29 PROCEDURE — 82164 ANGIOTENSIN I ENZYME TEST: CPT

## 2017-11-29 PROCEDURE — 86664 EPSTEIN-BARR NUCLEAR ANTIGEN: CPT

## 2017-11-29 PROCEDURE — 86622 BRUCELLA ANTIBODY: CPT

## 2017-11-29 PROCEDURE — 80076 HEPATIC FUNCTION PANEL: CPT

## 2017-11-29 PROCEDURE — 85027 COMPLETE CBC AUTOMATED: CPT

## 2017-11-29 PROCEDURE — 83935 ASSAY OF URINE OSMOLALITY: CPT

## 2017-11-29 PROCEDURE — 82962 GLUCOSE BLOOD TEST: CPT

## 2017-11-29 PROCEDURE — 84300 ASSAY OF URINE SODIUM: CPT

## 2017-11-29 PROCEDURE — 86255 FLUORESCENT ANTIBODY SCREEN: CPT

## 2017-11-29 PROCEDURE — 86225 DNA ANTIBODY NATIVE: CPT

## 2017-11-29 PROCEDURE — 87086 URINE CULTURE/COLONY COUNT: CPT

## 2017-11-29 PROCEDURE — 78815 PET IMAGE W/CT SKULL-THIGH: CPT

## 2017-11-29 PROCEDURE — 70553 MRI BRAIN STEM W/O & W/DYE: CPT

## 2017-11-29 RX ORDER — METHOCARBAMOL 500 MG/1
1 TABLET, FILM COATED ORAL
Qty: 0 | Refills: 0 | COMMUNITY
Start: 2017-11-29

## 2017-11-29 RX ORDER — PANTOPRAZOLE SODIUM 20 MG/1
1 TABLET, DELAYED RELEASE ORAL
Qty: 0 | Refills: 0 | COMMUNITY
Start: 2017-11-29

## 2017-11-29 RX ORDER — IOHEXOL 300 MG/ML
50 INJECTION, SOLUTION INTRAVENOUS ONCE
Qty: 0 | Refills: 0 | Status: COMPLETED | OUTPATIENT
Start: 2017-11-29 | End: 2017-11-29

## 2017-11-29 RX ADMIN — ONDANSETRON 4 MILLIGRAM(S): 8 TABLET, FILM COATED ORAL at 06:35

## 2017-11-29 RX ADMIN — PANTOPRAZOLE SODIUM 40 MILLIGRAM(S): 20 TABLET, DELAYED RELEASE ORAL at 09:19

## 2017-11-29 RX ADMIN — Medication 1 MILLIGRAM(S): at 09:19

## 2017-11-29 RX ADMIN — Medication 1 TABLET(S): at 15:16

## 2017-11-29 RX ADMIN — Medication 325 MILLIGRAM(S): at 06:36

## 2017-11-29 RX ADMIN — Medication 1 TABLET(S): at 15:33

## 2017-11-29 RX ADMIN — IOHEXOL 50 MILLILITER(S): 300 INJECTION, SOLUTION INTRAVENOUS at 18:11

## 2017-11-29 RX ADMIN — Medication 1 MILLIGRAM(S): at 02:39

## 2017-11-29 NOTE — PROGRESS NOTE ADULT - PROBLEM SELECTOR PROBLEM 1
Meningitis

## 2017-11-29 NOTE — PROGRESS NOTE ADULT - PROBLEM SELECTOR PROBLEM 4
Hyponatremia
Prophylactic measure

## 2017-11-29 NOTE — PROGRESS NOTE ADULT - SUBJECTIVE AND OBJECTIVE BOX
Neurology Follow up note    Name  JIM WILKINS    HPI:  32[place] with PMH of lumbar disc herniation (s/p epidural injection on 8/17) who was at [facility] on 11/13 for severe HA partially relieved by lying down, neck pain, fever, weakness, myalgias, back pain radiating to bl thighs.     Pt reports first experiencing similar symptoms 10 days ago (went to [facility] in [place] - neg w/u: LP, lumbar MRI w/contr, head CT, West Nile virus - discharged w/supportive tx + fiorocet).     Pt then went to Dr Carty and was sent to ED.  At [facility], neg MRI brain, neg RVP, neg BCx, neg HIV, evaluated by ID and Neuro - discharged home and felt better until 11/14 in pm - worsening of headache, photophobia and neck stiffness. She denied any other symptoms.  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   Denied: recent travel, IV drug use, unprotected intercourse, any rashes or bites, invasive procedures other than epidural injection in 08/2017.  In the ER, pt s/p IV hydromorphone and IVF, w/partial relief of headache; neck stiffness, photophobia, chills. ROS otherwise negative. Requesting food.  VS in ED: T(F): 101.5  HR: 108  BP: 89/57 RR: 16 SpO2: 98% (15 Nov 2017 19:50)      Interval History - headaches- patient seen by Dr monroe        REVIEW OF SYSTEMS    Vital Signs Last 24 Hrs  T(C): 37.9 (29 Nov 2017 05:01), Max: 39 (28 Nov 2017 21:35)  T(F): 100.2 (29 Nov 2017 05:01), Max: 102.2 (28 Nov 2017 21:35)  HR: 73 (29 Nov 2017 05:01) (73 - 98)  BP: 100/63 (29 Nov 2017 05:01) (95/63 - 109/71)  BP(mean): --  RR: 16 (29 Nov 2017 05:01) (16 - 18)  SpO2: 95% (29 Nov 2017 05:01) (95% - 98%)    Physical Exam-     Mental Status- awake- oriented to person place and time    Cranial Nerves-full EOM    Gait and station-no foot drop    Motor-moves all 4 extremities    Reflexes- decreased    Sensation-no sensory level    Coordination-nl    Vascular -nl    Medications  acetaminophen   Tablet 650 milliGRAM(s) Oral every 6 hours PRN  acetaminophen   Tablet. 650 milliGRAM(s) Oral every 6 hours PRN  aluminum hydroxide/magnesium hydroxide/simethicone Suspension 30 milliLiter(s) Oral every 4 hours PRN  bisacodyl 10 milliGRAM(s) Oral at bedtime  bisacodyl Suppository 10 milliGRAM(s) Rectal daily PRN  dexamethasone     Tablet   Oral   dexamethasone     Tablet 1 milliGRAM(s) Oral every 6 hours  dextrose 5%. 1000 milliLiter(s) IV Continuous <Continuous>  dextrose 50% Injectable 12.5 Gram(s) IV Push once  dextrose 50% Injectable 25 Gram(s) IV Push once  dextrose 50% Injectable 25 Gram(s) IV Push once  dextrose Gel 1 Dose(s) Oral once PRN  diphenhydrAMINE   Capsule 25 milliGRAM(s) Oral at bedtime PRN  glucagon  Injectable 1 milliGRAM(s) IntraMuscular once PRN  influenza   Vaccine 0.5 milliLiter(s) IntraMuscular once  insulin lispro (HumaLOG) corrective regimen sliding scale   SubCutaneous Before meals and at bedtime  methocarbamol 500 milliGRAM(s) Oral every 8 hours PRN  ondansetron    Tablet 4 milliGRAM(s) Oral every 8 hours PRN  oxyCODONE    IR 5 milliGRAM(s) Oral every 4 hours PRN  pantoprazole    Tablet 40 milliGRAM(s) Oral before breakfast  senna 2 Tablet(s) Oral at bedtime      Lab      Radiology    Assessment- Meningitis     Plan- GYN, CT chest abdomen and pelvis

## 2017-11-29 NOTE — PROGRESS NOTE ADULT - ASSESSMENT
32F w/ likely aseptic meningitis (confirmed not bacterial, or fungal, awaiting viral PCR from [place]) w/ persistent fevers of unclear etiology although patient has been using heating blanket over chest and neck. Transfer to [place] reinitiated. Pending bed availability.

## 2017-11-29 NOTE — PROGRESS NOTE ADULT - PROBLEM SELECTOR PLAN 5
F: no IVF  E: Replete PRN  N: Reg diet  PPX: No VTE ppx indicated, IMPROVE score 0    Full Code  DISPO: Requesting transfer to [place]. Transfer initiated, pending auth.

## 2017-11-29 NOTE — PROGRESS NOTE ADULT - PROBLEM SELECTOR PLAN 3
-s/p epidural injection  - no interventions at this time   - Dr. Dylon rodriguez for pain management, appreciate recs.

## 2017-11-29 NOTE — PROGRESS NOTE ADULT - SUBJECTIVE AND OBJECTIVE BOX
The patient still remains photophobic with nuchal rigidity and meningeal signs with positive straight leg raises.    Physical exam reveals a well-developed and well-nourished woman in pain line in a darkened room with her mother in attendance (her mother is a neurosurgical trauma nurse).  Her vital signs are within normal limits.  The patient is photophobic.  Examination of her head, ears, eyes, nose, throat is unremarkable although there is nuchal rigidity.  There is no lymphadenopathy, jugular venous distention or thyroidomegaly in the neck.  The lungs are clear to percussion and auscultation.  There is no axillary or breast pathology bilaterally.  The heart sounds are regular with no auscultatory evidence for murmur, rub, gallop or ectopy.  The patient has no truncal obesity and there is no palpable or percussible hepatomegaly or splenomegaly.  The patient has no chronic venous stasis changes in the lower extremities.  Neurologically the patient has hypoesthesia in an L12 L3 distribution on the left side with positive straight leg raises.  The patient has nuchal rigidity. These meningeal signs however are the same as yesterday.

## 2017-11-29 NOTE — PROGRESS NOTE ADULT - ASSESSMENT
The patient is clinically asymptomatic but no worse than yesterday.  The patient's mother is awaiting the viral studies on the spinal fluid which will be available late this week or by next Monday.

## 2017-11-29 NOTE — PROGRESS NOTE ADULT - PROBLEM SELECTOR PLAN 1
Infectious and autoimmune work up negative to date. Awaiting viral PCR for research lab at [place].   - S/p Decadron taper  -S/p Voriconazole  - Dr. Miller following and coordinating outside lab studies.  - BCx and CSF bacteria cultures NGTD, CSF viral culture negative,  fungitell negative   - f/u [facility] autoimmune encephalopathy panel  - F/u CSF viral PCR  - Neurology following, recs appreciated.

## 2017-11-29 NOTE — PROGRESS NOTE ADULT - ATTENDING COMMENTS
Patient seen and examined and evaluation completed by me in person
PERSONALLY EXAMINED AND INTERVIEWED PT AND DISCUSSED W MOTHER
Consider the use of voriconazole if isolation with a mold has been found.
I will discuss the findings with infectious disease and the molecular studies being performed at reference laboratories.
Patient seen and examined and evaluation completed by me in person
Patient seen and examined and evaluation completed by me in person    Case discussed extensively with Dr Lopez, house staff, Dr Merino, IR radiologist, neuroradiologists  Discussed with the patient and her Mother at bedside in the presence of the Resident and Dr Lopez
Patient seen and examined and evaluation completed by me in person.    More than 3 hours spent on the coordination of patient's care from ID perspective and CSF testing arrangements, and discussion with patient and her Mother today alone - so far.
Refrain from the use of antipyretics for fever. Use Dilaudid instead of Demerol in all cases!!
The patient and her attending physicians await the analysis of the spinal fluid from the state.
The patient should continue with supportive care for What likely is a viral aseptic meningitis. This is not likely to be autoimmune or related to drug Reaction.  This is not likely related to some other systemic disease.
The patient's antibiotics should be continued.  The patient should have mobilization.
Patient seen and examined and evaluation completed by me in person

## 2017-11-29 NOTE — PROGRESS NOTE ADULT - SUBJECTIVE AND OBJECTIVE BOX
Came to bedside after called for consultation, pt not in room and in CT per nurses station .   Will try again in AM.

## 2017-11-29 NOTE — PROGRESS NOTE ADULT - PROBLEM SELECTOR PLAN 2
Patient continues to be intermittently febrile, unclear origin with negative infectious work up to date. Patient c/o nausea and previously endorsing pelvic pain.  -F/u PET CT  -F/u CTAP  -Gyn consulted, appreciate recs  -F/u urine porphobilinogen

## 2017-11-29 NOTE — PROGRESS NOTE ADULT - NSHPATTENDINGPLANDISCUSS_GEN_ALL_CORE
House staff, Dr Carty, Mother and the patient at bedside
Angela [email or web address].HS
Adele Lopez and Carloz, and the housestaff on several occasions; as well as with the patient and her Mother
Agnes Carty/John, house staff, Laboratories/Lab directors, patient and her Mother
Agnes Lopez and Prosper, [place] staff, Dr Westbrook, patient and her Mother
Angela Schultz.HS
Angela Schultz.HS
Carloz [email or web address].HS
DR RUELAS PT AND MOTHER
House staff and patient at bedside
House staff, Dr Lopez at AM and the patient and her family at bedside
as above
house staff, ED attending, Dr Merino and patient and her Mother
house staff, patient and her Mother
the resident, the patient and her father
The patient, Dr. Carty, Dr. Teixeira and Dr. Miller.
The patient and Her mother in person and Dr. Miller  by telephone
The patient and her mother in person and Dr. Dharmesh Carty and Dr. Torrey Teixeira in person
The patient and her mother in person, Dr. Carty in person and Dr. Teixeira and Dr. Miller by telephone
The patient, her mother and Dr. Carty in person.
The patient, her mother, Dr. Dharmesh Carty and Dr. Torrey Teixeira in person.
covering intern and Dr Lopez

## 2017-11-29 NOTE — PROGRESS NOTE ADULT - SUBJECTIVE AND OBJECTIVE BOX
OVERNIGHT EVENTS: GIFTY    SUBJECTIVE / INTERVAL HPI: Patient seen and examined at bedside.  She reports continued nausea, denies vomiting She also reports a dull headache/ head discomfort, photophobia, myalgias, back pain, f/c, and SOB. She denies any abdominal pain, dysuria, or diarrhea.     VITAL SIGNS:  Vital Signs Last 24 Hrs  T(C): 37.8 (29 Nov 2017 14:44), Max: 39 (28 Nov 2017 21:35)  T(F): 100 (29 Nov 2017 14:44), Max: 102.2 (28 Nov 2017 21:35)  HR: 128 (29 Nov 2017 14:44) (73 - 128)  BP: 95/67 (29 Nov 2017 14:44) (95/67 - 100/63)  BP(mean): --  RR: 16 (29 Nov 2017 14:44) (16 - 17)  SpO2: 96% (29 Nov 2017 14:44) (95% - 97%)    PHYSICAL EXAM:    General: Ill-appearing, laying in bed in NAD.   HEENT: NC/AT; PERRL, anicteric sclera; MMM  Cardiovascular: +S1/S2; RRR, no m/r/g  Respiratory: CTA B/L; no W/R/R  Gastrointestinal: soft, NT/ND; no guarding   Extremities: WWP; No edema or cyanosis.       MEDICATIONS:  MEDICATIONS  (STANDING):  bisacodyl 10 milliGRAM(s) Oral at bedtime  influenza   Vaccine 0.5 milliLiter(s) IntraMuscular once  iohexol 350 mG (iodine)/mL Oral Solution 50 milliLiter(s) Oral once  pantoprazole    Tablet 40 milliGRAM(s) Oral before breakfast  senna 2 Tablet(s) Oral at bedtime    MEDICATIONS  (PRN):  acetaminophen   Tablet 650 milliGRAM(s) Oral every 6 hours PRN For Temp greater than 38 C (100.4 F)  acetaminophen   Tablet. 650 milliGRAM(s) Oral every 6 hours PRN Mild Pain (1 - 3)  aluminum hydroxide/magnesium hydroxide/simethicone Suspension 30 milliLiter(s) Oral every 4 hours PRN Dyspepsia  bisacodyl Suppository 10 milliGRAM(s) Rectal daily PRN Constipation  diphenhydrAMINE   Capsule 25 milliGRAM(s) Oral at bedtime PRN Rash and/or Itching  methocarbamol 500 milliGRAM(s) Oral every 8 hours PRN Muscle stiffness/spasms  ondansetron    Tablet 4 milliGRAM(s) Oral every 8 hours PRN Nausea  oxyCODONE    IR 5 milliGRAM(s) Oral every 4 hours PRN Moderate Pain (4 - 6)      ALLERGIES:  Allergies    aspirin (Hives; Swelling)  ibuprofen (Rash; Flushing; Hives)  lidocaine (Short breath)    Intolerances        LABS:                        13.1   13.5  )-----------( 223      ( 28 Nov 2017 07:58 )             39.3     11-28    131<L>  |  91<L>  |  15  ----------------------------<  126<H>  4.7   |  28  |  0.67    Ca    8.6      28 Nov 2017 07:58  Mg     2.4     11-28    TPro  6.2  /  Alb  3.4  /  TBili  0.4  /  DBili  x   /  AST  12  /  ALT  45  /  AlkPhos  33<L>  11-28        CAPILLARY BLOOD GLUCOSE      POCT Blood Glucose.: 116 mg/dL (29 Nov 2017 14:16)      RADIOLOGY & ADDITIONAL TESTS: Reviewed.

## 2017-11-30 LAB
CULTURE RESULTS: NO GROWTH — SIGNIFICANT CHANGE UP
SPECIMEN SOURCE: SIGNIFICANT CHANGE UP

## 2017-11-30 NOTE — PROGRESS NOTE ADULT - PROVIDER SPECIALTY LIST ADULT
GYN
GYN
Heme/Onc
Infectious Disease
Internal Medicine
Neurology
Ophthalmology
Pain Medicine
Internal Medicine
Neurology
Infectious Disease
Internal Medicine
Infectious Disease
Internal Medicine
Rheumatology
Internal Medicine

## 2017-12-02 LAB
CULTURE RESULTS: NO GROWTH — SIGNIFICANT CHANGE UP
SPECIMEN SOURCE: SIGNIFICANT CHANGE UP

## 2017-12-05 LAB — MISCELLANEOUS TEST NAME: SIGNIFICANT CHANGE UP

## 2017-12-11 DIAGNOSIS — I95.9 HYPOTENSION, UNSPECIFIED: ICD-10-CM

## 2017-12-11 DIAGNOSIS — R44.1 VISUAL HALLUCINATIONS: ICD-10-CM

## 2017-12-11 DIAGNOSIS — E87.1 HYPO-OSMOLALITY AND HYPONATREMIA: ICD-10-CM

## 2017-12-11 DIAGNOSIS — G03.9 MENINGITIS, UNSPECIFIED: ICD-10-CM

## 2017-12-11 DIAGNOSIS — T37.95XA: ICD-10-CM

## 2017-12-11 DIAGNOSIS — M51.26 OTHER INTERVERTEBRAL DISC DISPLACEMENT, LUMBAR REGION: ICD-10-CM

## 2017-12-11 DIAGNOSIS — Z88.6 ALLERGY STATUS TO ANALGESIC AGENT: ICD-10-CM
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
## 2017-12-11 DIAGNOSIS — R44.0 AUDITORY HALLUCINATIONS: ICD-10-CM

## 2017-12-16 LAB
CULTURE RESULTS: SIGNIFICANT CHANGE UP
SPECIMEN SOURCE: SIGNIFICANT CHANGE UP

## 2018-01-06 LAB
CULTURE RESULTS: SIGNIFICANT CHANGE UP
SPECIMEN SOURCE: SIGNIFICANT CHANGE UP

## 2018-06-08 NOTE — ED ADULT TRIAGE NOTE - RESPIRATORY RATE (BREATHS/MIN)
Call to patient. Patient denies any complaints related to anticoagulation therapy at this time. Patient reports no change in medication, diet or health.   Dosing instructions given to patient verbally over the phone. Advised to call the clinic with any questions or concerns. Patient verbalizes understanding. Has clinic number.    Anticoagulation Summary  As of 6/8/2018    INR goal:   2.0-3.0   TTR:   51.6 % (3.1 mo)   Today's INR:   2.2   Warfarin maintenance plan:   5 mg (2.5 mg x 2) on M, F; 2.5 mg (2.5 mg x 1) all other days   Weekly warfarin total:   22.5 mg   Plan last modified:   Tatiana Yeager RN (6/8/2018)   Next INR check:   6/12/2018   Target end date:       Indications    Chronic atrial fibrillation (CMS/HCC) [I48.2]             Anticoagulation Episode Summary     INR check location:   Clinic Lab    Preferred lab:   [facility]    Send INR reminders to:   [facility] (OPEN ENROLLMENT) ACS CARD/EP    Comments:   Next STAC due 12/14/18;  [facility]; 2.5 mg and 4 mg tablets PLEASE ONLY CALL PT AT HER HOME, BENITEZ SWAIN IS CAREGIVER OF PT AND AT HER HOME FOR SEVERAL HOURS DAILY      Anticoagulation Care Providers     Provider Role Specialty Phone number    Melecio Sommer MD Referring Internal Medicine- Interventional Cardiology [phone]          Supervising provider: DR LOUISE Callaway       [phone]

## 2019-08-21 NOTE — DISCHARGE NOTE ADULT - MEDICATION SUMMARY - MEDICATIONS TO TAKE
No
I will START or STAY ON the medications listed below when I get home from the hospital:    butalbital/acetaminophen/caffeine 50 mg-325 mg-40 mg oral tablet  -- 1 tab(s) by mouth every 4 hours, As needed, headache  -- Indication: For HEADACHE

## 2022-09-28 NOTE — PROGRESS NOTE ADULT - SUBJECTIVE AND OBJECTIVE BOX
[facility] at [address] [facility]  [facility], [address]  Ph [phone]  Fx [phone]    Physical Therapy Progress Note  Patient name: Annette Sow Start of Care: 2022   Referral source: Simba Wilson MD : 1951   Medical/Treatment Diagnosis: Other abnormal involuntary movements [R25.8]  Parkinson's disease [G20] Onset Date:2022   Prior Hospitalization: see medical history Provider#: [id number]   Medications: Verified on Patient Summary List    Comorbidities:  sciatica, Mini stroke,  Right knee OA, Osteoporosis, High BP, Tyroid disorders, Crohns, Lalo Foot toe fixation, scoliosis, urinary incontinence. Prior Level of Function:ambulatory with SPC prior to July, lives with , loves sewing, painting, do crafts, swim, left handed    Visits from [facility]: 6    Missed Visits: 2    Goals/Measure of Progress:    Short Term Goals: To be accomplished in 2 weeks:              Patient will report compliance with HEP at least 1x/day to aid in rehabilitation program.              Status at IE: to be given at visit 2              Current: Unable to perform due to BP issues 22                 Patient will decrease TUG by 3 seconds to display MCID and progression to decreased falls risk. Status at IE: 13 sec              Current: 9 sec 22 MET                            Patient will improve Mini BEST est test score to 25/28 to improve safety with gait activities. Eval Status:                Current:  Regression since hold 22     Long Term Goals: To be accomplished in 6 weeks:              Patient will increase strength as indicated by sit to stand transfers to 5 repetitions in < 10  seconds throughout Lalo LEs to aid in return recreational activities and ADLs.               Status at IE: 12 sec              Current: 12 Sec 22                 Patient will be able to ambulate 1000 feet with SPC and good step over step gait SUBJECTIVE / INTERVAL HPI: Patient seen and examined at bedside.     VITAL SIGNS:  Vital Signs Last 24 Hrs  T(C): 37.9 (28 Nov 2017 05:41), Max: 38.5 (27 Nov 2017 17:16)  T(F): 100.3 (28 Nov 2017 05:41), Max: 101.3 (27 Nov 2017 17:16)  HR: 82 (28 Nov 2017 05:41) (81 - 97)  BP: 102/62 (28 Nov 2017 05:41) (100/65 - 114/70)  BP(mean): --  RR: 16 (28 Nov 2017 05:41) (16 - 18)  SpO2: 96% (28 Nov 2017 05:41) (96% - 96%)    PHYSICAL EXAM:    General: WDWN  HEENT: NC/AT; PERRL, clear conjunctiva  Neck: supple  Cardiovascular: +S1/S2; RRR  Respiratory: CTA b/l; no W/R/R  Gastrointestinal: soft, NT/ND; +BSx4  Extremities: WWP; 2+ peripheral pulses; no edema   Neurological: AAOx3; no focal deficits    MEDICATIONS:  MEDICATIONS  (STANDING):  bisacodyl 10 milliGRAM(s) Oral at bedtime  dexamethasone     Tablet   Oral   dexamethasone     Tablet 1 milliGRAM(s) Oral every 6 hours  dextrose 5%. 1000 milliLiter(s) (50 mL/Hr) IV Continuous <Continuous>  dextrose 50% Injectable 12.5 Gram(s) IV Push once  dextrose 50% Injectable 25 Gram(s) IV Push once  dextrose 50% Injectable 25 Gram(s) IV Push once  influenza   Vaccine 0.5 milliLiter(s) IntraMuscular once  insulin lispro (HumaLOG) corrective regimen sliding scale   SubCutaneous Before meals and at bedtime  pantoprazole    Tablet 40 milliGRAM(s) Oral before breakfast  senna 2 Tablet(s) Oral at bedtime  voriconazole 250 milliGRAM(s) Oral every 12 hours    MEDICATIONS  (PRN):  acetaminophen   Tablet 650 milliGRAM(s) Oral every 6 hours PRN For Temp greater than 38 C (100.4 F)  acetaminophen   Tablet. 650 milliGRAM(s) Oral every 6 hours PRN Mild Pain (1 - 3)  aluminum hydroxide/magnesium hydroxide/simethicone Suspension 30 milliLiter(s) Oral every 4 hours PRN Dyspepsia  bisacodyl Suppository 10 milliGRAM(s) Rectal daily PRN Constipation  dextrose Gel 1 Dose(s) Oral once PRN Blood Glucose LESS THAN 70 milliGRAM(s)/deciliter  diphenhydrAMINE   Capsule 25 milliGRAM(s) Oral at bedtime PRN Rash and/or Itching  glucagon  Injectable 1 milliGRAM(s) IntraMuscular once PRN Glucose LESS THAN 70 milligrams/deciliter  methocarbamol 500 milliGRAM(s) Oral every 8 hours PRN Muscle stiffness/spasms  ondansetron    Tablet 4 milliGRAM(s) Oral every 8 hours PRN Nausea  oxyCODONE    IR 10 milliGRAM(s) Oral every 4 hours PRN Severe Pain (7 - 10)  oxyCODONE    IR 5 milliGRAM(s) Oral every 4 hours PRN Moderate Pain (4 - 6)      ALLERGIES:  Allergies    aspirin (Hives; Swelling)  ibuprofen (Rash; Flushing; Hives)  lidocaine (Short breath)    Intolerances        LABS:                        12.7   13.8  )-----------( 257      ( 27 Nov 2017 07:04 )             37.8     11-27    132<L>  |  92<L>  |  12  ----------------------------<  136<H>  4.4   |  27  |  0.70    Ca    8.6      27 Nov 2017 07:04  Mg     2.4     11-27    TPro  6.0  /  Alb  3.2<L>  /  TBili  0.3  /  DBili  x   /  AST  15  /  ALT  63<H>  /  AlkPhos  35<L>  11-27        CAPILLARY BLOOD GLUCOSE      POCT Blood Glucose.: 115 mg/dL (28 Nov 2017 07:38)      RADIOLOGY & ADDITIONAL TESTS: Reviewed. pattern to demonstrate decreased risk for falls. Status at IE: ambulatory with Rollator and SPC using SBA for cane and mod ind for rollator. Anterior trunk lean with both AD. Decreased foot clearance noted               Current: ~60 ft with [facility] 9/28/22                            Patient will improve FOTO to full points overall to demonstrate improvement in functional ability. Status at IE:to be taken at visit 2               Current: 48 9/28/22    Key Functional Changes:  Pt has been absent from physical therapy due to difficulties with BP levels. Pt has since received new medications and has been cleared by doctor to return to PT. Pt presented with improved BP levels and was appropriate for therapy. Pt demonstrated improvement with TUG test since initial eval but, has mildly regressed due to hold on exercise by MD. Pt was able to tolerate several LSVT BIG exercises with good BP levels. Pt overall has had minor regression or maintained previous levels of functionality since eval. Pt will benefit from continud therapy to address above deficits with LSVT BIG program as focus for symptoms of Parkinson's.     ASSESSMENT/RECOMMENDATIONS:  [x]Continue therapy per initial plan/protocol at a frequency of  4 x per week for 4 weeks  []Continue therapy with the following recommended changes:_____________________ _____________________________ ________________________________________  []Discontinue therapy progressing towards or have reached established goals  []Discontinue therapy due to lack of appreciable progress towards goals  []Discontinue therapy due to lack of attendance or compliance  []Await Physician's recommendations/decisions regarding therapy  []Other:________________________________________________________________    Thank you for this referral.   Cyrus Iglesias, PTA 9/28/2022 4:35 PM    Signed By: Rosie Mclaughlin PT     September 29, 2022          Physician's Signature:____________________ Date:_________ TIME:________                                      Josef Miguel MD      ** Signature, Date and Time must be completed for valid certification ** SUBJECTIVE / INTERVAL HPI: Febrile again overnight. Patient seen and examined at bedside. Patient doing well this morning. Continues to report nuchal stiffness. Does not report photophobia this morning. Of note patient was using a heating blanket placed over upper chest and neck this morning. Denies chills. Denies N/V/SOB/CP/changes in bowel or bladder habits.    VITAL SIGNS:  Vital Signs Last 24 Hrs  T(C): 37.9 (28 Nov 2017 05:41), Max: 38.5 (27 Nov 2017 17:16)  T(F): 100.3 (28 Nov 2017 05:41), Max: 101.3 (27 Nov 2017 17:16)  HR: 82 (28 Nov 2017 05:41) (81 - 97)  BP: 102/62 (28 Nov 2017 05:41) (100/65 - 114/70)  BP(mean): --  RR: 16 (28 Nov 2017 05:41) (16 - 18)  SpO2: 96% (28 Nov 2017 05:41) (96% - 96%)    PHYSICAL EXAM:    General: WDWN, resting in bed comfortably, NAD.  HEENT: NC/AT; PERRL, clear conjunctiva. No photophobia when flashlight shined in eyes.  Neck: Minimal neck stiffness.  Respiratory: CTA b/l. No w/r/r.   Cardiovascular: +S1/S2; RRR. No m/r/g.  Abdomen: soft, NT/ND; +BS x4  Extremities: WWP, 2+ peripheral pulses b/l; no LE edema  Skin: normal color and turgor; no rash  Neurological: AAOx3. EOMI. No focal deficits. Negative straight leg raise this morning.      MEDICATIONS:  MEDICATIONS  (STANDING):  bisacodyl 10 milliGRAM(s) Oral at bedtime  dexamethasone     Tablet   Oral   dexamethasone     Tablet 1 milliGRAM(s) Oral every 6 hours  dextrose 5%. 1000 milliLiter(s) (50 mL/Hr) IV Continuous <Continuous>  dextrose 50% Injectable 12.5 Gram(s) IV Push once  dextrose 50% Injectable 25 Gram(s) IV Push once  dextrose 50% Injectable 25 Gram(s) IV Push once  influenza   Vaccine 0.5 milliLiter(s) IntraMuscular once  insulin lispro (HumaLOG) corrective regimen sliding scale   SubCutaneous Before meals and at bedtime  pantoprazole    Tablet 40 milliGRAM(s) Oral before breakfast  senna 2 Tablet(s) Oral at bedtime  voriconazole 250 milliGRAM(s) Oral every 12 hours    MEDICATIONS  (PRN):  acetaminophen   Tablet 650 milliGRAM(s) Oral every 6 hours PRN For Temp greater than 38 C (100.4 F)  acetaminophen   Tablet. 650 milliGRAM(s) Oral every 6 hours PRN Mild Pain (1 - 3)  aluminum hydroxide/magnesium hydroxide/simethicone Suspension 30 milliLiter(s) Oral every 4 hours PRN Dyspepsia  bisacodyl Suppository 10 milliGRAM(s) Rectal daily PRN Constipation  dextrose Gel 1 Dose(s) Oral once PRN Blood Glucose LESS THAN 70 milliGRAM(s)/deciliter  diphenhydrAMINE   Capsule 25 milliGRAM(s) Oral at bedtime PRN Rash and/or Itching  glucagon  Injectable 1 milliGRAM(s) IntraMuscular once PRN Glucose LESS THAN 70 milligrams/deciliter  methocarbamol 500 milliGRAM(s) Oral every 8 hours PRN Muscle stiffness/spasms  ondansetron    Tablet 4 milliGRAM(s) Oral every 8 hours PRN Nausea  oxyCODONE    IR 10 milliGRAM(s) Oral every 4 hours PRN Severe Pain (7 - 10)  oxyCODONE    IR 5 milliGRAM(s) Oral every 4 hours PRN Moderate Pain (4 - 6)      ALLERGIES:  Allergies    aspirin (Hives; Swelling)  ibuprofen (Rash; Flushing; Hives)  lidocaine (Short breath)    Intolerances        LABS:                        12.7   13.8  )-----------( 257      ( 27 Nov 2017 07:04 )             37.8     11-27    132<L>  |  92<L>  |  12  ----------------------------<  136<H>  4.4   |  27  |  0.70    Ca    8.6      27 Nov 2017 07:04  Mg     2.4     11-27    TPro  6.0  /  Alb  3.2<L>  /  TBili  0.3  /  DBili  x   /  AST  15  /  ALT  63<H>  /  AlkPhos  35<L>  11-27        CAPILLARY BLOOD GLUCOSE      POCT Blood Glucose.: 115 mg/dL (28 Nov 2017 07:38)      RADIOLOGY & ADDITIONAL TESTS: Reviewed.

## 2022-12-15 NOTE — PROGRESS NOTE ADULT - SUBJECTIVE AND OBJECTIVE BOX
Neurology Follow up note    Name  JIM WILKINS    HPI:  32[place] with PMH of lumbar disc herniation (s/p epidural injection on 8/17) who was at [facility] on 11/13 for severe HA partially relieved by lying down, neck pain, fever, weakness, myalgias, back pain radiating to bl thighs.     Pt reports first experiencing similar symptoms 10 days ago (went to [facility] in [place] - neg w/u: LP, lumbar MRI w/contr, head CT, West Nile virus - discharged w/supportive tx + fiorocet).     Pt then went to Dr Carty and was sent to ED.  At [facility], neg MRI brain, neg RVP, neg BCx, neg HIV, evaluated by ID and Neuro - discharged home and felt better until 11/14 in pm - worsening of headache, photophobia and neck stiffness. She denied any other symptoms.  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   Denied: recent travel, IV drug use, unprotected intercourse, any rashes or bites, invasive procedures other than epidural injection in 08/2017.  In the ER, pt s/p IV hydromorphone and IVF, w/partial relief of headache; neck stiffness, photophobia, chills. ROS otherwise negative. Requesting food.  VS in ED: T(F): 101.5  HR: 108  BP: 89/57 RR: 16 SpO2: 98% (15 Nov 2017 19:50)      Interval History -continued fevers and headaches        REVIEW OF SYSTEMS    Vital Signs Last 24 Hrs  T(C): 37.3 (27 Nov 2017 09:03), Max: 39.4 (27 Nov 2017 04:34)  T(F): 99.2 (27 Nov 2017 09:03), Max: 102.9 (27 Nov 2017 04:34)  HR: 84 (27 Nov 2017 09:03) (84 - 102)  BP: 104/64 (27 Nov 2017 09:03) (93/55 - 112/61)  BP(mean): --  RR: 18 (27 Nov 2017 09:03) (18 - 20)  SpO2: 96% (27 Nov 2017 09:03) (94% - 98%)    Physical Exam-     Mental Status- awake and alert    Cranial Nerves-nl    Gait and station-n/a    Motor-nl    Reflexes-nl    Sensation-nl    Coordination-nl    Vascular -intact    Medications  acetaminophen   Tablet 650 milliGRAM(s) Oral every 6 hours PRN  acetaminophen   Tablet. 650 milliGRAM(s) Oral every 6 hours PRN  acetaminophen 325 mG/butalbital 50 mG/caffeine 40 mG 1 Tablet(s) Oral once PRN  aluminum hydroxide/magnesium hydroxide/simethicone Suspension 30 milliLiter(s) Oral every 4 hours PRN  bisacodyl Suppository 10 milliGRAM(s) Rectal daily PRN  dexamethasone     Tablet   Oral   dexamethasone     Tablet 2 milliGRAM(s) Oral every 6 hours  dextrose 5%. 1000 milliLiter(s) IV Continuous <Continuous>  dextrose 50% Injectable 12.5 Gram(s) IV Push once  dextrose 50% Injectable 25 Gram(s) IV Push once  dextrose 50% Injectable 25 Gram(s) IV Push once  dextrose Gel 1 Dose(s) Oral once PRN  diphenhydrAMINE   Capsule 25 milliGRAM(s) Oral at bedtime PRN  glucagon  Injectable 1 milliGRAM(s) IntraMuscular once PRN  influenza   Vaccine 0.5 milliLiter(s) IntraMuscular once  insulin lispro (HumaLOG) corrective regimen sliding scale   SubCutaneous Before meals and at bedtime  methocarbamol 500 milliGRAM(s) Oral every 8 hours PRN  ondansetron    Tablet 4 milliGRAM(s) Oral every 8 hours PRN  oxyCODONE    IR 10 milliGRAM(s) Oral every 4 hours PRN  oxyCODONE    IR 5 milliGRAM(s) Oral every 4 hours PRN  pantoprazole    Tablet 40 milliGRAM(s) Oral before breakfast  polyethylene glycol 3350 17 Gram(s) Oral every 12 hours  senna 2 Tablet(s) Oral at bedtime  voriconazole 250 milliGRAM(s) Oral every 12 hours      Lab      Radiology    Assessment- FUO- neuro intact    Plan- Autoimmune studies, cultures- taper decadron by 1mg every for each dose   Spoke to Dr Mack and Dr Floyd Scalpel Size: 15 blade

## 2023-03-06 NOTE — ED PROVIDER NOTE - HISTORY ATTESTATION, MLM
Received a call from Lory Barry after she had prescribed this patient Prednisone 20 mg daily x 5 days. Patient has been on Prednisone in the past and it has increased her INR requiring dose adjustments of her warfarin. HOWEVER, this patient is taking her last dose of warfarin on 3/3/2023 and holding warfarin x 5 days prior to planned pacemaker procedure on 3/8/2023. She will be bridging with Lovenox prior to procedure and restarting her warfarin post-procedurally. Since she will complete the course of Prednisone prior to restarting her warfarin therapy post-procedure, we will not need to adjust warfarin dosing at this time. We will keep her previously scheduled INR check for 3/14/2023. I have reviewed and confirmed nurses' notes...

## 2024-10-23 NOTE — ED ADULT TRIAGE NOTE - SPO2 (%)
Therapy Communication Note    Patient Name: Kamila Jones  MRN: [id number]  Today's Date: 10/23/2024    Discipline: Physical Therapy    Missed Visit Reason:      Missed Time: Cancel    Comment: Meeting with hospice during attempt at PT follow up   99